# Patient Record
Sex: MALE | Race: WHITE | Employment: OTHER | URBAN - METROPOLITAN AREA
[De-identification: names, ages, dates, MRNs, and addresses within clinical notes are randomized per-mention and may not be internally consistent; named-entity substitution may affect disease eponyms.]

---

## 2017-03-09 ENCOUNTER — ALLIED HEALTH/NURSE VISIT (OUTPATIENT)
Dept: CARDIOLOGY | Facility: CLINIC | Age: 82
End: 2017-03-09
Payer: MEDICARE

## 2017-03-09 DIAGNOSIS — Z95.0 CARDIAC PACEMAKER IN SITU: Primary | ICD-10-CM

## 2017-03-09 PROCEDURE — 93293 PM PHONE R-STRIP DEVICE EVAL: CPT | Performed by: INTERNAL MEDICINE

## 2017-03-09 NOTE — MR AVS SNAPSHOT
"              After Visit Summary   3/9/2017    Balta Drake    MRN: [id number]           Patient Information     Date Of Birth          10/23/1927        Visit Information        Provider Department      3/9/2017 11:15 AM GOINS TECH2 [facility]        Today's Diagnoses     Cardiac pacemaker in situ    -  1       Follow-ups after your visit        Additional Services     Follow-Up with Device Clinic                 Future tests that were ordered for you today     Open Future Orders        Priority Expected Expires Ordered    Follow-Up with Device Clinic Routine 6/9/2017 4/13/2018 3/9/2017            Who to contact     If you have questions or need follow up information about today's clinic visit or your schedule please contact [facility] directly at [phone].  Normal or non-critical lab and imaging results will be communicated to you by [employer], letter or phone within 4 business days after the clinic has received the results. If you do not hear from us within 7 days, please contact the clinic through [employer] or phone. If you have a critical or abnormal lab result, we will notify you by phone as soon as possible.  Submit refill requests through [employer] or call your pharmacy and they will forward the refill request to us. Please allow 3 business days for your refill to be completed.          Additional Information About Your Visit        MyChart Information     [employer] lets you send messages to your doctor, view your test results, renew your prescriptions, schedule appointments and more. To sign up, go to [email or web address] . Click on \"Log in\" on the left side of the screen, which will take you to the Welcome page. Then click on \"Sign up Now\" on the right side of the page.     You will be asked to enter the access code listed below, as well as some personal information. Please follow the directions to create your username and " password.     Your access code is: [email or web address]  Expires: 2017 11:17 AM     Your access code will  in 90 days. If you need help or a new code, please call your [place] clinic or [phone].        Care EveryWhere ID     This is your Care EveryWhere ID. This could be used by other organizations to access your [place] medical records  [id number]         Blood Pressure from Last 3 Encounters:   16 110/66   16 116/64   09/25/15 100/60    Weight from Last 3 Encounters:   16 89.8 kg (198 lb)   16 91.1 kg (200 lb 14.4 oz)   09/25/15 87.8 kg (193 lb 8 oz)              We Performed the Following     PM PHONE R STRIP EVAL UP TO 90 DAYS ([phone])        Primary Care Provider Office Phone # Fax #    Edmundo Roblero [phone]       [place] [address]        Thank you!     Thank you for choosing [facility] AT [place]  for your care. Our goal is always to provide you with excellent care. Hearing back from our patients is one way we can continue to improve our services. Please take a few minutes to complete the written survey that you may receive in the mail after your visit with us. Thank you!             Your Updated Medication List - Protect others around you: Learn how to safely use, store and throw away your medicines at www.disposemymeds.org.          This list is accurate as of: 3/9/17 11:17 AM.  Always use your most recent med list.                   Brand Name Dispense Instructions for use    amiodarone 200 MG tablet    PACERONE/CODARONE    45 tablet    Take 0.5 tablets (100 mg) by mouth daily 1/2 daily       celecoxib 200 MG capsule    celeBREX    90 capsule    Take 1 capsule by mouth daily.       ezetimibe-simvastatin 10-40 MG per tablet    VYTORIN    90 tablet    Take 1 tablet by mouth At Bedtime       FLOMAX 0.4 MG capsule   Generic drug:  tamsulosin      Take 0.4 mg by mouth daily        metoprolol 50 MG 24 hr tablet    TOPROL-XL    90 tablet    Take 1 tablet (50 mg) by mouth daily       NITROGLYCERIN SL      Place 0.4 mg under the tongue every 5 minutes as needed for chest pain       senna-docusate 8.6-50 MG per tablet    SENOKOT-S;PERICOLACE    60 tablet    Take 1-2 tablets by mouth 2 times daily Continue while taking narcotic pain medications. Hold for loose stools.       TYLENOL PO      Take by mouth every 4 hours as needed       warfarin 2.5 MG tablet    COUMADIN    100 tablet    Take 2 tabs on Wednesdays and take 1 tab on all other days or as directed by INR clinic

## 2017-03-09 NOTE — NURSING NOTE
Phone teletrace  Intrinsic Rhythm at time of check. Magnet response WNL.  F/U scheduled q 3 months. For annual threshold.  Order placed, pt will make appointment same time as MD.

## 2017-05-02 ENCOUNTER — DOCUMENTATION ONLY (OUTPATIENT)
Dept: CARDIOLOGY | Facility: CLINIC | Age: 82
End: 2017-05-02

## 2017-05-02 DIAGNOSIS — I48.91 ATRIAL FIBRILLATION (H): Primary | ICD-10-CM

## 2017-05-18 ENCOUNTER — TRANSFERRED RECORDS (OUTPATIENT)
Dept: CARDIOLOGY | Facility: CLINIC | Age: 82
End: 2017-05-18

## 2017-05-24 ENCOUNTER — PRE VISIT (OUTPATIENT)
Dept: CARDIOLOGY | Facility: CLINIC | Age: 82
End: 2017-05-24

## 2017-05-24 DIAGNOSIS — I49.5 SICK SINUS SYNDROME (H): ICD-10-CM

## 2017-05-24 DIAGNOSIS — I48.0 PAROXYSMAL ATRIAL FIBRILLATION (H): ICD-10-CM

## 2017-06-07 ENCOUNTER — HOSPITAL ENCOUNTER (OUTPATIENT)
Dept: RESPIRATORY THERAPY | Facility: CLINIC | Age: 82
End: 2017-06-07
Attending: INTERNAL MEDICINE
Payer: MEDICARE

## 2017-06-07 ENCOUNTER — HOSPITAL ENCOUNTER (OUTPATIENT)
Dept: GENERAL RADIOLOGY | Facility: CLINIC | Age: 82
Discharge: HOME | End: 2017-06-07
Attending: INTERNAL MEDICINE | Admitting: INTERNAL MEDICINE
Payer: MEDICARE

## 2017-06-07 ENCOUNTER — ANTICOAGULATION THERAPY VISIT (OUTPATIENT)
Dept: CARDIOLOGY | Facility: CLINIC | Age: 82
End: 2017-06-07
Payer: MEDICARE

## 2017-06-07 ENCOUNTER — HOSPITAL ENCOUNTER (OUTPATIENT)
Dept: CARDIOLOGY | Facility: CLINIC | Age: 82
End: 2017-06-07
Attending: INTERNAL MEDICINE
Payer: MEDICARE

## 2017-06-07 DIAGNOSIS — I10 ESSENTIAL HYPERTENSION WITH GOAL BLOOD PRESSURE LESS THAN 140/90: ICD-10-CM

## 2017-06-07 DIAGNOSIS — Z79.01 LONG-TERM (CURRENT) USE OF ANTICOAGULANTS: ICD-10-CM

## 2017-06-07 DIAGNOSIS — Z95.0 CARDIAC PACEMAKER IN SITU: ICD-10-CM

## 2017-06-07 DIAGNOSIS — I21.29 ACUTE MYOCARDIAL INFARCTION INVOLVING OTHER CORONARY ARTERY: ICD-10-CM

## 2017-06-07 DIAGNOSIS — I25.10 ASCVD (ARTERIOSCLEROTIC CARDIOVASCULAR DISEASE): ICD-10-CM

## 2017-06-07 DIAGNOSIS — I48.0 PAROXYSMAL ATRIAL FIBRILLATION (H): ICD-10-CM

## 2017-06-07 DIAGNOSIS — I48.91 ATRIAL FIBRILLATION, UNSPECIFIED TYPE (H): ICD-10-CM

## 2017-06-07 LAB
ALBUMIN SERPL-MCNC: 3.7 G/DL (ref 3.4–5)
ALP SERPL-CCNC: 89 U/L (ref 40–150)
ALT SERPL W P-5'-P-CCNC: 20 U/L (ref 0–70)
ANION GAP SERPL CALCULATED.3IONS-SCNC: 7.3 MMOL/L (ref 6–17)
AST SERPL W P-5'-P-CCNC: 18 U/L (ref 0–45)
BILIRUB DIRECT SERPL-MCNC: 0.2 MG/DL (ref 0–0.2)
BILIRUB SERPL-MCNC: 0.7 MG/DL (ref 0.2–1.3)
BUN SERPL-MCNC: 22 MG/DL (ref 7–30)
CALCIUM SERPL-MCNC: 8.6 MG/DL (ref 8.5–10.5)
CHLORIDE SERPL-SCNC: 106 MMOL/L (ref 98–107)
CHOLEST SERPL-MCNC: 119 MG/DL
CO2 SERPL-SCNC: 27 MMOL/L (ref 23–29)
CREAT SERPL-MCNC: 1.13 MG/DL (ref 0.7–1.3)
DLCOCOR-%PRED-PRE: 85 %
DLCOCOR-PRE: 17.26 ML/MIN/MMHG
DLCOUNC-%PRED-PRE: 84 %
DLCOUNC-PRE: 17.01 ML/MIN/MMHG
DLCOUNC-PRED: 20.19 ML/MIN/MMHG
ERV-%PRED-PRE: 160 %
ERV-PRE: 0.53 L
ERV-PRED: 0.33 L
EXPTIME-PRE: 8.85 SEC
FEF2575-%PRED-PRE: 180 %
FEF2575-PRE: 2.7 L/SEC
FEF2575-PRED: 1.5 L/SEC
FEFMAX-%PRED-PRE: 133 %
FEFMAX-PRE: 6.87 L/SEC
FEFMAX-PRED: 5.14 L/SEC
FEV1-%PRED-PRE: 123 %
FEV1-PRE: 2.69 L
FEV1FEV6-PRE: 81 %
FEV1FEV6-PRED: 75 %
FEV1FVC-PRE: 80 %
FEV1FVC-PRED: 75 %
FEV1SVC-PRE: 81 %
FEV1SVC-PRED: 59 %
FIFMAX-PRE: 2.24 L/SEC
FRCPLETH-%PRED-PRE: 88 %
FRCPLETH-PRE: 3.26 L
FRCPLETH-PRED: 3.67 L
FVC-%PRED-PRE: 115 %
FVC-PRE: 3.38 L
FVC-PRED: 2.93 L
GFR SERPL CREATININE-BSD FRML MDRD: 61 ML/MIN/1.7M2
GLUCOSE SERPL-MCNC: 100 MG/DL (ref 70–105)
HDLC SERPL-MCNC: 49 MG/DL
HGB BLD-MCNC: 14.1 G/DL (ref 13.3–17.7)
IC-%PRED-PRE: 82 %
IC-PRE: 2.79 L
IC-PRED: 3.37 L
INR POINT OF CARE: 2.4 (ref 0.86–1.14)
LDLC SERPL CALC-MCNC: 51 MG/DL
NONHDLC SERPL-MCNC: 70 MG/DL
POTASSIUM SERPL-SCNC: 4.3 MMOL/L (ref 3.5–5.1)
PROT SERPL-MCNC: 7 G/DL (ref 6.8–8.8)
RVPLETH-%PRED-PRE: 92 %
RVPLETH-PRE: 2.73 L
RVPLETH-PRED: 2.95 L
SODIUM SERPL-SCNC: 136 MMOL/L (ref 136–145)
TLCPLETH-%PRED-PRE: 94 %
TLCPLETH-PRE: 6.06 L
TLCPLETH-PRED: 6.42 L
TRIGL SERPL-MCNC: 95 MG/DL
TSH SERPL DL<=0.05 MIU/L-ACNC: 1.15 MU/L (ref 0.4–4)
VA-%PRED-PRE: 92 %
VA-PRE: 5.67 L
VC-%PRED-PRE: 89 %
VC-PRE: 3.32 L
VC-PRED: 3.7 L

## 2017-06-07 PROCEDURE — 80048 BASIC METABOLIC PNL TOTAL CA: CPT | Performed by: INTERNAL MEDICINE

## 2017-06-07 PROCEDURE — 85610 PROTHROMBIN TIME: CPT | Mod: QW | Performed by: INTERNAL MEDICINE

## 2017-06-07 PROCEDURE — 94010 BREATHING CAPACITY TEST: CPT

## 2017-06-07 PROCEDURE — 85018 HEMOGLOBIN: CPT | Performed by: INTERNAL MEDICINE

## 2017-06-07 PROCEDURE — 40000264 ECHO COMPLETE WITH LUMASON

## 2017-06-07 PROCEDURE — 80061 LIPID PANEL: CPT | Performed by: INTERNAL MEDICINE

## 2017-06-07 PROCEDURE — 80076 HEPATIC FUNCTION PANEL: CPT | Performed by: INTERNAL MEDICINE

## 2017-06-07 PROCEDURE — 93306 TTE W/DOPPLER COMPLETE: CPT | Mod: 26 | Performed by: INTERNAL MEDICINE

## 2017-06-07 PROCEDURE — 71010 XR CHEST 1 VW: CPT

## 2017-06-07 PROCEDURE — 84443 ASSAY THYROID STIM HORMONE: CPT | Performed by: INTERNAL MEDICINE

## 2017-06-07 PROCEDURE — 25500064 ZZH RX 255 OP 636: Performed by: INTERNAL MEDICINE

## 2017-06-07 PROCEDURE — 94729 DIFFUSING CAPACITY: CPT

## 2017-06-07 PROCEDURE — 36416 COLLJ CAPILLARY BLOOD SPEC: CPT | Performed by: INTERNAL MEDICINE

## 2017-06-07 PROCEDURE — 94726 PLETHYSMOGRAPHY LUNG VOLUMES: CPT

## 2017-06-07 RX ADMIN — SULFUR HEXAFLUORIDE 5 ML: KIT at 10:30

## 2017-06-07 NOTE — MR AVS SNAPSHOT
Balta Drake   6/7/2017 1:00 PM   Anticoagulation Therapy Visit    Description:  89 year old male   Provider:   ANTICOAGULATION   Department:  [facility]           INR as of 6/7/2017     Today's INR 2.4      Anticoagulation Summary as of 6/7/2017     INR goal 2.0-3.0   Today's INR 2.4   Full instructions 5 mg on Wed; 2.5 mg all other days   Next INR check 7/12/2017    Indications   Long-term (current) use of anticoagulants [Z79.01] [Z79.01]  Paroxysmal atrial fibrillation (H) [I48.0]         Your next Anticoagulation Clinic appointment(s)     Jun 07, 2017  1:00 PM CDT   Anticoagulation Visit with  ANTICOAGULATION   [facility] ([facility])    [address] [place], [place] [address]  [facility] [address]   [phone]            Jul 12, 2017  1:00 PM CDT   Anticoagulation Visit with  ANTICOAGULATION   [facility] ([facility])    [address] [place], [place] [address]  [facility] [address]   [phone]              Contact Numbers     Anticoagulant (INR) Clinic Number: [phone]          June 2017 Details    Sun Mon Tue Wed Thu Fri Sat         1               2               3                 4               5               6               7      5 mg   See details      8      2.5 mg         9      2.5 mg         10      2.5 mg           11      2.5 mg         12      2.5 mg         13      2.5 mg         14      5 mg         15      2.5 mg         16      2.5 mg         17      2.5 mg           18      2.5 mg         19      2.5 mg         20      2.5 mg         21      5 mg         22      2.5 mg         23      2.5 mg         24      2.5 mg           25      2.5 mg         26      2.5 mg         27      2.5 mg         28      5 mg         29      2.5 mg         30      2.5 mg           Date Details   06/07 This INR check               How to take your warfarin dose     To take:  2.5 mg Take 1 of the 2.5  mg tablets.    To take:  5 mg Take 2 of the 2.5 mg tablets.           July 2017 Details    Sun Mon Tue Wed Thu Fri Sat           1      2.5 mg           2      2.5 mg         3      2.5 mg         4      2.5 mg         5      5 mg         6      2.5 mg         7      2.5 mg         8      2.5 mg           9      2.5 mg         10      2.5 mg         11      2.5 mg         12            13               14               15                 16               17               18               19               20               21               22                 23               24               25               26               27               28               29                 30               31                     Date Details   No additional details    Date of next INR:  7/12/2017         How to take your warfarin dose     To take:  2.5 mg Take 1 of the 2.5 mg tablets.    To take:  5 mg Take 2 of the 2.5 mg tablets.

## 2017-06-07 NOTE — PROGRESS NOTES
ANTICOAGULATION FOLLOW-UP CLINIC VISIT    Patient Name:  Balta Drake  Date:  6/7/2017  Contact Type:  Face to Face    SUBJECTIVE:     Patient Findings     Positives No Problem Findings           OBJECTIVE    INR Protime   Date Value Ref Range Status   06/07/2017 2.4 (A) 0.86 - 1.14 Final       ASSESSMENT / PLAN  INR assessment THER    Recheck INR In: 5 WEEKS    INR Location Clinic      Anticoagulation Summary as of 6/7/2017     INR goal 2.0-3.0   Today's INR 2.4   Maintenance plan 5 mg (2.5 mg x 2) on Wed; 2.5 mg (2.5 mg x 1) all other days   Full instructions 5 mg on Wed; 2.5 mg all other days   Weekly total 20 mg   No change documented Karina Costa RN   Plan last modified Karina Costa RN (5/19/2016)   Next INR check 7/12/2017   Target end date Indefinite    Indications   Long-term (current) use of anticoagulants [Z79.01] [Z79.01]  Paroxysmal atrial fibrillation (H) [I48.0]         Anticoagulation Episode Summary     INR check location     Preferred lab     Send INR reminders to [facility] HEART INR NURSE    Comments       Anticoagulation Care Providers     Provider Role Specialty Phone number    Fran Galo MD Responsible Cardiology [phone]            See the Encounter Report to view Anticoagulation Flowsheet and Dosing Calendar (Go to Encounters tab in chart review, and find the Anticoagulation Therapy Visit)    INR 2.4.  He is back from [place] and he said his INRs tested in [place] were all between 2-3 and dosing didn't change.  Continue same schedule and recheck in 5 weeks to get past 4th of July week.  Allen Costa, RN

## 2017-06-14 ENCOUNTER — OFFICE VISIT (OUTPATIENT)
Dept: CARDIOLOGY | Facility: CLINIC | Age: 82
End: 2017-06-14
Attending: INTERNAL MEDICINE
Payer: MEDICARE

## 2017-06-14 VITALS
HEIGHT: 66 IN | SYSTOLIC BLOOD PRESSURE: 102 MMHG | BODY MASS INDEX: 31.98 KG/M2 | WEIGHT: 199 LBS | HEART RATE: 88 BPM | DIASTOLIC BLOOD PRESSURE: 66 MMHG

## 2017-06-14 DIAGNOSIS — I21.29 ACUTE MYOCARDIAL INFARCTION INVOLVING OTHER CORONARY ARTERY: ICD-10-CM

## 2017-06-14 DIAGNOSIS — I25.10 ASCVD (ARTERIOSCLEROTIC CARDIOVASCULAR DISEASE): ICD-10-CM

## 2017-06-14 DIAGNOSIS — Z79.01 LONG TERM (CURRENT) USE OF ANTICOAGULANTS: ICD-10-CM

## 2017-06-14 DIAGNOSIS — I49.5 SINOATRIAL NODE DYSFUNCTION (H): Primary | ICD-10-CM

## 2017-06-14 DIAGNOSIS — Z95.0 CARDIAC PACEMAKER IN SITU: ICD-10-CM

## 2017-06-14 DIAGNOSIS — I10 ESSENTIAL HYPERTENSION WITH GOAL BLOOD PRESSURE LESS THAN 140/90: ICD-10-CM

## 2017-06-14 DIAGNOSIS — I48.91 ATRIAL FIBRILLATION, UNSPECIFIED TYPE (H): ICD-10-CM

## 2017-06-14 PROCEDURE — 99215 OFFICE O/P EST HI 40 MIN: CPT | Mod: 25 | Performed by: INTERNAL MEDICINE

## 2017-06-14 PROCEDURE — 93000 ELECTROCARDIOGRAM COMPLETE: CPT | Performed by: INTERNAL MEDICINE

## 2017-06-14 PROCEDURE — 93280 PM DEVICE PROGR EVAL DUAL: CPT | Performed by: INTERNAL MEDICINE

## 2017-06-14 RX ORDER — WARFARIN SODIUM 2.5 MG/1
TABLET ORAL
Qty: 100 TABLET | Refills: 3 | Status: SHIPPED | OUTPATIENT
Start: 2017-06-14 | End: 2017-10-03

## 2017-06-14 NOTE — MR AVS SNAPSHOT
After Visit Summary   6/14/2017    Balta Drake    MRN: [id number]           Patient Information     Date Of Birth          10/23/1927        Visit Information        Provider Department      6/14/2017 2:15 PM Fran Galo MD [facility] HEART [facility]        Today's Diagnoses     ASCVD (arteriosclerotic cardiovascular disease)        Acute myocardial infarction involving other coronary artery (H)        Essential hypertension with goal blood pressure less than 140/90        Atrial fibrillation, unspecified type (H)        Cardiac pacemaker in situ        Long term (current) use of anticoagulants           Follow-ups after your visit        Additional Services     Follow-Up with Cardiologist                 Your next 10 appointments already scheduled     Jul 12, 2017  1:00 PM CDT   Anticoagulation Visit with GOINS ANTICOAGULATION   [facility] ([facility])    [facility] [facility] [address]  [facility] [address] [phone]            Sep 20, 2017 11:30 AM CDT   Phone Device Check with GOINS TECH1   [facility] ([facility])    [address] [id number]  [facility] [address] [phone]              Future tests that were ordered for you today     Open Future Orders        Priority Expected Expires Ordered    Basic metabolic panel Routine 9/12/2017 6/14/2018 6/14/2017    Lipid Profile Routine 9/12/2017 6/14/2018 6/14/2017    ALT Routine 9/12/2017 6/14/2018 6/14/2017    Follow-Up with Cardiologist Routine 9/12/2017 6/14/2018 6/14/2017            Who to contact     If you have questions or need follow up information about today's clinic visit or your schedule please contact [facility] directly at [phone].  Normal or non-critical lab and imaging results will be communicated to you by MyChart, letter or phone within 4  "business days after the clinic has received the results. If you do not hear from us within 7 days, please contact the clinic through [employer] or phone. If you have a critical or abnormal lab result, we will notify you by phone as soon as possible.  Submit refill requests through [employer] or call your pharmacy and they will forward the refill request to us. Please allow 3 business days for your refill to be completed.          Additional Information About Your Visit        [employer] Information     [employer] lets you send messages to your doctor, view your test results, renew your prescriptions, schedule appointments and more. To sign up, go to [email or web address] . Click on \"Log in\" on the left side of the screen, which will take you to the Welcome page. Then click on \"Sign up Now\" on the right side of the page.     You will be asked to enter the access code listed below, as well as some personal information. Please follow the directions to create your username and password.     Your access code is: [email or web address]  Expires: 2017  1:45 PM     Your access code will  in 90 days. If you need help or a new code, please call your [place] clinic or [phone].        Care EveryWhere ID     This is your Care EveryWhere ID. This could be used by other organizations to access your [place] medical records  [id number]        Your Vitals Were     Pulse Height BMI (Body Mass Index)             88 1.676 m (5' 6\") 32.12 kg/m2          Blood Pressure from Last 3 Encounters:   17 102/66   16 110/66   16 116/64    Weight from Last 3 Encounters:   17 90.3 kg (199 lb)   16 89.8 kg (198 lb)   16 91.1 kg (200 lb 14.4 oz)              We Performed the Following     EKG 12-lead complete w/read - Clinics (performed today)     Follow-Up with Cardiologist          Where to get your medicines      These medications were sent to [facility] [place] - [facility] "  [address] [address]     Phone:  [phone]     warfarin 2.5 MG tablet          Primary Care Provider Office Phone # Fax #    Edmundo Roblero [phone]       [address] [address]        Thank you!     Thank you for choosing [facility] [place] [facility] AT [place]  for your care. Our goal is always to provide you with excellent care. Hearing back from our patients is one way we can continue to improve our services. Please take a few minutes to complete the written survey that you may receive in the mail after your visit with us. Thank you!             Your Updated Medication List - Protect others around you: Learn how to safely use, store and throw away your medicines at www.disposemymeds.org.          This list is accurate as of: 6/14/17  3:39 PM.  Always use your most recent med list.                   Brand Name Dispense Instructions for use    amiodarone 200 MG tablet    PACERONE/CODARONE    45 tablet    Take 0.5 tablets (100 mg) by mouth daily 1/2 daily       celecoxib 200 MG capsule    celeBREX    90 capsule    Take 1 capsule by mouth daily.       ezetimibe-simvastatin 10-40 MG per tablet    VYTORIN    90 tablet    Take 1 tablet by mouth At Bedtime       FLOMAX 0.4 MG capsule   Generic drug:  tamsulosin      Take 0.4 mg by mouth daily       metoprolol 50 MG 24 hr tablet    TOPROL-XL    90 tablet    Take 1 tablet (50 mg) by mouth daily       NITROGLYCERIN SL      Place 0.4 mg under the tongue every 5 minutes as needed for chest pain       senna-docusate 8.6-50 MG per tablet    SENOKOT-S;PERICOLACE    60 tablet    Take 1-2 tablets by mouth 2 times daily Continue while taking narcotic pain medications. Hold for loose stools.       TYLENOL PO      Take by mouth every 4 hours as needed       warfarin 2.5 MG tablet    COUMADIN    100 tablet    Take 2 tabs on Wednesdays and take 1 tab on all other days or as directed  by INR clinic

## 2017-06-14 NOTE — LETTER
6/14/2017    Edmundo Roblero MD  [address] [address]    RE: Balta Drake       Dear Colleague,    I had the pleasure of seeing Balta Drake in the [facility].    The patient is an 89-year-old overweight white male hospitalized at [facility] in 2002 because of increased symptoms of chest discomfort associated with acute coronary syndrome.  He underwent cardiac catheterization and coronary angiography which demonstrated decreased flow in the first obtuse marginal branch of the circumflex.  He was treated with PTCA and stent in the marginal branch of the circumflex.  There was mild disease in left anterior descending and right coronary at that time.  He was also treated for sick sinus syndrome with evidence of tachybrady syndrome with paroxysmal atrial fibrillation with a permanent pacemaker in [place] in the mid 2000s.  He also a history of diverticulitis, GI upset and diarrhea as well as paroxysmal atrial fibrillation with isolated episodes of nonsustained ventricular tachycardia with a 4-beat run noted on Holter monitors in the past.        Most recent nuclear stress test was performed in 2014 showed a small to moderate size reversible anterolateral defect consistent with ischemia with moderate-sized partly reversible inferior/inferolateral defect consistent with nontransmural infarct with moderate jax-infarct ischemia.  Because of significant ischemia, he underwent cardiac catheterization and was noted to have patent stents, good flow through the left main coronary artery.  There was diffuse disease in the circumflex marginal branch with ISABEL 3 flow.  It was recommended that he have aggressive medical therapy.  The ramus intermediate branch was noted to be occluded with angiography.      MEDICATIONS:   1.  Metoprolol XL 50 mg a day.   2.  Amiodarone 100 mg a day.     3.  Ezetimibe/simvastatin 10/40 mg a day.   4.   Senna docusate 1-2 tablets as needed.   5.  Acetaminophen 650 mg every 4 hours as needed.   6.  Nitroglycerin 0.4 mg sublingual p.r.n., which has not been used.   7.  Flomax 0.4 mg a day.   8.  Celebrex 200 mg a day.   9.  Albuterol inhaler as needed.      LABORATORY DATA:  Demonstrated a cholesterol 119, HDL 49, LDL 51, triglyceride 95.  Sodium 136, potassium 4.3, BUN 22, creatinine 1.13.  TSH is 1.15, ALT is 20, AST is 18.  INR was 2.4.        The patient had electrocardiogram at the time of his clinic visit which showed an atrial paced rhythm with a prolonged OR interval and native QRS complexes that did show conduction system delay.  The patient states that he has not had significant chest discomfort, shortness of breath at rest, dizziness, palpitations, nausea, vomiting, diaphoresis or syncope.  He has increasing symptoms of easy fatigability, general malaise and mild-to-moderate dyspnea on exertion.  He also has been somewhat frustrated with the confusion about his prescriptions, especially that of warfarin since he spends half of his year in [place] and 1/2 of his year in [place].        He again does not describe symptoms of PND, orthopnea, fever, chills or sweats.  He presents to Cardiology Clinic for followup of his ischemic heart disease as well as his pacemaker that was placed floor bradydysrhythmia.  He had an echocardiogram done last year that showed mild to moderate global hypokinesis with ejection fraction of 40%-45%, normal-sized left ventricle and right ventricle with intact systolic function of the right ventricle.  There is mild to moderate dilatation of left atrium with a pacing wire in the right atrium and right ventricle.  There was mild aortic, mitral and tricuspid insufficiency without significant stenosis.      PHYSICAL EXAMINATION:   VITAL SIGNS:  Blood pressure 102/66, heart rate of 80-90, irregular.  Weight was 199 pounds, which is stable.   NECK:  Without jugular venous distention,  carotid bruit or palpable thyroid.   CHEST:  Essentially clear to percussion and auscultation with a few scattered isolated crackles and slight dullness at the bases.   CARDIAC:  Revealed a fairly regular rhythm, a 1-2/6 systolic murmur at the left sternal border with no significant radiation.  No diastolic murmur, rub or S3.   EXTREMITIES:  Without significant cyanosis.  There was trace to 1+ edema bilaterally.      CLINICAL IMPRESSION:   1.  Stable cardiac condition.   2.  Ischemic heart disease, status post PTCA and stent placed in the obtuse marginal branch of the circumflex coronary with repeat cardiac catheterization showing diffuse 2-vessel disease involving the circumflex primarily with recommendation medical therapy in 2014.   3.  Hyperlipidemia, at goal.   4.  Hypertension with adequate control.   5.  Significant degenerative joint disease and osteoarthritis with spinal stenosis causing significant pain, status post surgery because of recurrent left leg pain.   6.  History of paroxysmal atrial fibrillation, becoming more prevalent.   7.  Status post hip replacement surgery.   8.  History of permanent pacemaker insertion for bradydysrhythmia.      DISCUSSION:  The patient has actually done reasonably well from a cardiac viewpoint.  He feels quite limited with regards easy fatigability, general malaise and he has nocturia 4-5 times a night.  He does not participate in regular activity and has had poor sleep and somewhat poor nutrition, especially since he has had to deal with his own food.  He has had followup of amiodarone with normal liver function tests and pulmonary function tests.  He will continue cautious anticoagulation because of elevated CHADS-VASc score and have close followup of serum lipids, basic metabolic panel and blood pressure.      Echocardiography that was performed with contrast demonstrated ejection fraction of 35%-40% of the left ventricle and mild to moderate global hypokinesis.   There was evidence of impaired left ventricular relaxation.  There is a pacemaker seen in the right ventricle and right atrium.  There was mild aortic insufficiency, mild aortic root dilatation, mild dilatation of the ascending thoracic aorta.  It was discussed the patient means of which to decrease his nocturia and to try to reestablish activity without necessarily having to change much in the way of his medications.  The pacemaker has been adjusted at least once if not twice to provide for better heart rate response to activity.      RECOMMENDATIONS:   1.  Continue present medications.   2.  Close followup of serum lipids, basic metabolic panel and blood pressure.   3.  Device followup with possible adjustment of rate responsiveness.   4.  Cautious anticoagulation.   5.  Diet and exercise as tolerated on a regular basis.   6.  Attempt to maintain sleep pattern.   7.  May consider Holter monitor later in the year to follow rhythm.   8.  Routine medical followup.   9.  Cardiology followup in 4-6 months     Again, thank you for allowing me to participate in the care of your patient.      Sincerely,    Fran Galo MD     [facility]

## 2017-06-14 NOTE — MR AVS SNAPSHOT
After Visit Summary   6/14/2017    Balta Drake    MRN: [id number]           Patient Information     Date Of Birth          10/23/1927        Visit Information        Provider Department      6/14/2017 1:00 PM GOINS DCRN [facility]        Today's Diagnoses     Sinoatrial node dysfunction (H)    -  1    Cardiac pacemaker in situ           Follow-ups after your visit        Your next 10 appointments already scheduled     Jun 14, 2017  2:15 PM CDT   Return Visit with Fran Galo MD   [facility] ([facility])    [address] [place] [address] [address]  [facility] [address]   [phone]            Jul 12, 2017  1:00 PM CDT   Anticoagulation Visit with BUSTER ANTICOAGULATION   [facility] ([facility])    [address] [place] [address] [address]  [facility] [id number] [phone]            Sep 20, 2017 11:30 AM CDT   Phone Device Check with GOINS TECH1   [facility] ([facility])    [address] [place] [address] [address]  [facility] [address] [phone]              Who to contact     If you have questions or need follow up information about today's clinic visit or your schedule please contact [facility] directly at [phone].  Normal or non-critical lab and imaging results will be communicated to you by MyChart, letter or phone within 4 business days after the clinic has received the results. If you do not hear from us within 7 days, please contact the clinic through MyChart or phone. If you have a critical or abnormal lab result, we will notify you by phone as soon as possible.  Submit refill requests through [employer] or call your pharmacy and they will forward the refill request to us. Please allow 3 business days for your refill to be completed.          Additional Information About Your  "Visit        MyChart Information     [employer] lets you send messages to your doctor, view your test results, renew your prescriptions, schedule appointments and more. To sign up, go to [email or web address] . Click on \"Log in\" on the left side of the screen, which will take you to the Welcome page. Then click on \"Sign up Now\" on the right side of the page.     You will be asked to enter the access code listed below, as well as some personal information. Please follow the directions to create your username and password.     Your access code is: [email or web address]  Expires: 2017  1:45 PM     Your access code will  in 90 days. If you need help or a new code, please call your [place] clinic or [phone].        Care EveryWhere ID     This is your Care EveryWhere ID. This could be used by other organizations to access your [place] medical records  [id number]         Blood Pressure from Last 3 Encounters:   16 110/66   16 116/64   09/25/15 100/60    Weight from Last 3 Encounters:   16 89.8 kg (198 lb)   16 91.1 kg (200 lb 14.4 oz)   09/25/15 87.8 kg (193 lb 8 oz)              We Performed the Following     Follow-Up with Device Clinic     PM DEVICE PROGRAMMING EVAL, DUAL LEAD PACER ([id number])        Primary Care Provider Office Phone # Fax #    Edmundo Roblero [phone]       [address] [address]        Thank you!     Thank you for choosing [facility] [place]  for your care. Our goal is always to provide you with excellent care. Hearing back from our patients is one way we can continue to improve our services. Please take a few minutes to complete the written survey that you may receive in the mail after your visit with us. Thank you!             Your Updated Medication List - Protect others around you: Learn how to safely use, store and throw away your medicines at www.disposemymeds.org.    "       This list is accurate as of: 6/14/17  1:45 PM.  Always use your most recent med list.                   Brand Name Dispense Instructions for use    amiodarone 200 MG tablet    PACERONE/CODARONE    45 tablet    Take 0.5 tablets (100 mg) by mouth daily 1/2 daily       celecoxib 200 MG capsule    celeBREX    90 capsule    Take 1 capsule by mouth daily.       ezetimibe-simvastatin 10-40 MG per tablet    VYTORIN    90 tablet    Take 1 tablet by mouth At Bedtime       FLOMAX 0.4 MG capsule   Generic drug:  tamsulosin      Take 0.4 mg by mouth daily       metoprolol 50 MG 24 hr tablet    TOPROL-XL    90 tablet    Take 1 tablet (50 mg) by mouth daily       NITROGLYCERIN SL      Place 0.4 mg under the tongue every 5 minutes as needed for chest pain       senna-docusate 8.6-50 MG per tablet    SENOKOT-S;PERICOLACE    60 tablet    Take 1-2 tablets by mouth 2 times daily Continue while taking narcotic pain medications. Hold for loose stools.       TYLENOL PO      Take by mouth every 4 hours as needed       warfarin 2.5 MG tablet    COUMADIN    100 tablet    Take 2 tabs on Wednesdays and take 1 tab on all other days or as directed by INR clinic

## 2017-06-14 NOTE — PROGRESS NOTES
[employer] (D) Pacemaker Device Check  AP: 98 % : 10 %  Mode: DDDR         Underlying Rhythm: SB with first degree AVB, HR 43 bpm  Heart Rate: adequate variability, approx 70% of beats at LRL 70, but pt reports feeling more fatigued lately.   Sensing: WNL    Pacing Threshold: slightly elevated in A and V, but stable   Impedance: WNL  Battery Status: 2 yrs estimated longevity  Atrial Arrhythmia: 15 episodes of ATR in the last year, 7 with EGMs confirming afib, A rate 250-300 bpm, V rate mostly controlled/paced. Longest episode 7 hrs in duration. Afib burden <1% in the last year. Pt is on warfarin. No afib episodes since February 2017.   Ventricular Arrhythmia: none  Setting Change: increased Rate Response Factor from 12 to 13 due to pt reports of fatigue and not a lot of variability on his HR histogram. Pt does report his fatigue is only increased in the past month or so, so explained this may not completely solve his symptoms of fatigue.     Care Plan: phone check in 3 months, scheduled.   Pt has OV with Dr. Galo today.    MEG RN

## 2017-06-15 NOTE — PROGRESS NOTES
HISTORY OF PRESENT ILLNESS:  The patient is an 89-year-old overweight white male hospitalized at [facility] in 2002 because of increased symptoms of chest discomfort associated with acute coronary syndrome.  He underwent cardiac catheterization and coronary angiography which demonstrated decreased flow in the first obtuse marginal branch of the circumflex.  He was treated with PTCA and stent in the marginal branch of the circumflex.  There was mild disease in left anterior descending and right coronary at that time.  He was also treated for sick sinus syndrome with evidence of tachybrady syndrome with paroxysmal atrial fibrillation with a permanent pacemaker in [place] in the mid 2000s.  He also a history of diverticulitis, GI upset and diarrhea as well as paroxysmal atrial fibrillation with isolated episodes of nonsustained ventricular tachycardia with a 4-beat run noted on Holter monitors in the past.        Most recent nuclear stress test was performed in 2014 showed a small to moderate size reversible anterolateral defect consistent with ischemia with moderate-sized partly reversible inferior/inferolateral defect consistent with nontransmural infarct with moderate jax-infarct ischemia.  Because of significant ischemia, he underwent cardiac catheterization and was noted to have patent stents, good flow through the left main coronary artery.  There was diffuse disease in the circumflex marginal branch with ISABEL 3 flow.  It was recommended that he have aggressive medical therapy.  The ramus intermediate branch was noted to be occluded with angiography.      MEDICATIONS:   1.  Metoprolol XL 50 mg a day.   2.  Amiodarone 100 mg a day.     3.  Ezetimibe/simvastatin 10/40 mg a day.   4.  Senna docusate 1-2 tablets as needed.   5.  Acetaminophen 650 mg every 4 hours as needed.   6.  Nitroglycerin 0.4 mg sublingual p.r.n., which has not been used.   7.  Flomax 0.4 mg a day.   8.  Celebrex 200 mg a day.    9.  Albuterol inhaler as needed.      LABORATORY DATA:  Demonstrated a cholesterol 119, HDL 49, LDL 51, triglyceride 95.  Sodium 136, potassium 4.3, BUN 22, creatinine 1.13.  TSH is 1.15, ALT is 20, AST is 18.  INR was 2.4.        The patient had electrocardiogram at the time of his clinic visit which showed an atrial paced rhythm with a prolonged IL interval and native QRS complexes that did show conduction system delay.  The patient states that he has not had significant chest discomfort, shortness of breath at rest, dizziness, palpitations, nausea, vomiting, diaphoresis or syncope.  He has increasing symptoms of easy fatigability, general malaise and mild-to-moderate dyspnea on exertion.  He also has been somewhat frustrated with the confusion about his prescriptions, especially that of warfarin since he spends half of his year in [place] and 1/2 of his year in [place].        He again does not describe symptoms of PND, orthopnea, fever, chills or sweats.  He presents to Cardiology Clinic for followup of his ischemic heart disease as well as his pacemaker that was placed floor bradydysrhythmia.  He had an echocardiogram done last year that showed mild to moderate global hypokinesis with ejection fraction of 40%-45%, normal-sized left ventricle and right ventricle with intact systolic function of the right ventricle.  There is mild to moderate dilatation of left atrium with a pacing wire in the right atrium and right ventricle.  There was mild aortic, mitral and tricuspid insufficiency without significant stenosis.      PHYSICAL EXAMINATION:   VITAL SIGNS:  Blood pressure 102/66, heart rate of 80-90, irregular.  Weight was 199 pounds, which is stable.   NECK:  Without jugular venous distention, carotid bruit or palpable thyroid.   CHEST:  Essentially clear to percussion and auscultation with a few scattered isolated crackles and slight dullness at the bases.   CARDIAC:  Revealed a fairly regular rhythm, a  1-2/6 systolic murmur at the left sternal border with no significant radiation.  No diastolic murmur, rub or S3.   EXTREMITIES:  Without significant cyanosis.  There was trace to 1+ edema bilaterally.      CLINICAL IMPRESSION:   1.  Stable cardiac condition.   2.  Ischemic heart disease, status post PTCA and stent placed in the obtuse marginal branch of the circumflex coronary with repeat cardiac catheterization showing diffuse 2-vessel disease involving the circumflex primarily with recommendation medical therapy in 2014.   3.  Hyperlipidemia, at goal.   4.  Hypertension with adequate control.   5.  Significant degenerative joint disease and osteoarthritis with spinal stenosis causing significant pain, status post surgery because of recurrent left leg pain.   6.  History of paroxysmal atrial fibrillation, becoming more prevalent.   7.  Status post hip replacement surgery.   8.  History of permanent pacemaker insertion for bradydysrhythmia.      DISCUSSION:  The patient has actually done reasonably well from a cardiac viewpoint.  He feels quite limited with regards easy fatigability, general malaise and he has nocturia 4-5 times a night.  He does not participate in regular activity and has had poor sleep and somewhat poor nutrition, especially since he has had to deal with his own food.  He has had followup of amiodarone with normal liver function tests and pulmonary function tests.  He will continue cautious anticoagulation because of elevated CHADS-VASc score and have close followup of serum lipids, basic metabolic panel and blood pressure.      Echocardiography that was performed with contrast demonstrated ejection fraction of 35%-40% of the left ventricle and mild to moderate global hypokinesis.  There was evidence of impaired left ventricular relaxation.  There is a pacemaker seen in the right ventricle and right atrium.  There was mild aortic insufficiency, mild aortic root dilatation, mild dilatation of the  ascending thoracic aorta.  It was discussed the patient means of which to decrease his nocturia and to try to reestablish activity without necessarily having to change much in the way of his medications.  The pacemaker has been adjusted at least once if not twice to provide for better heart rate response to activity.      RECOMMENDATIONS:   1.  Continue present medications.   2.  Close followup of serum lipids, basic metabolic panel and blood pressure.   3.  Device followup with possible adjustment of rate responsiveness.   4.  Cautious anticoagulation.   5.  Diet and exercise as tolerated on a regular basis.   6.  Attempt to maintain sleep pattern.   7.  May consider Holter monitor later in the year to follow rhythm.   8.  Routine medical followup.   9.  Cardiology followup in 4-6 months      cc:      Edmundo Roblero MD    [address] [address]         MADDI CONNELL MD, [facility]             D: 2017 20:30   T: 06/15/2017 13:14   MT: REGLA      Name:     VOLODYMYR GONZALES   MRN:      [id number]        Account:      [id number]   :      10/23/1927           Service Date: 2017      Document: [id number]

## 2017-07-12 ENCOUNTER — ANTICOAGULATION THERAPY VISIT (OUTPATIENT)
Dept: CARDIOLOGY | Facility: CLINIC | Age: 82
End: 2017-07-12
Payer: MEDICARE

## 2017-07-12 ENCOUNTER — MEDICAL CORRESPONDENCE (OUTPATIENT)
Dept: CARDIOLOGY | Facility: CLINIC | Age: 82
End: 2017-07-12

## 2017-07-12 DIAGNOSIS — Z79.01 LONG-TERM (CURRENT) USE OF ANTICOAGULANTS: ICD-10-CM

## 2017-07-12 DIAGNOSIS — I48.0 PAROXYSMAL ATRIAL FIBRILLATION (H): ICD-10-CM

## 2017-07-12 LAB — INR POINT OF CARE: 2.1 (ref 0.86–1.14)

## 2017-07-12 PROCEDURE — 85610 PROTHROMBIN TIME: CPT | Mod: QW | Performed by: INTERNAL MEDICINE

## 2017-07-12 PROCEDURE — 36416 COLLJ CAPILLARY BLOOD SPEC: CPT | Performed by: INTERNAL MEDICINE

## 2017-07-12 NOTE — MR AVS SNAPSHOT
Balta Drake   7/12/2017 1:00 PM   Anticoagulation Therapy Visit    Description:  89 year old male   Provider:  GOINS ANTICOAGULATION   Department:  [facility]           INR as of 7/12/2017     Today's INR 2.1      Anticoagulation Summary as of 7/12/2017     INR goal 2.0-3.0   Today's INR 2.1   Full instructions 5 mg on Wed; 2.5 mg all other days   Next INR check 8/9/2017    Indications   Long-term (current) use of anticoagulants [Z79.01] [Z79.01]  Paroxysmal atrial fibrillation (H) [I48.0]         Your next Anticoagulation Clinic appointment(s)     Aug 09, 2017 11:00 AM CDT   Anticoagulation Visit with  ANTICOAGULATION   [facility] ([facility])    [address] [place] [id number] [phone]              Contact Numbers     Anticoagulant (INR) Clinic Number: [id number]          July 2017 Details    Sun Mon Tue Wed Thu Fri Sat           1                 2               3               4               5               6               7               8                 9               10               11               12      5 mg   See details      13      2.5 mg         14      2.5 mg         15      2.5 mg           16      2.5 mg         17      2.5 mg         18      2.5 mg         19      5 mg         20      2.5 mg         21      2.5 mg         22      2.5 mg           23      2.5 mg         24      2.5 mg         25      2.5 mg         26      5 mg         27      2.5 mg         28      2.5 mg         29      2.5 mg           30      2.5 mg         31      2.5 mg               Date Details   07/12 This INR check               How to take your warfarin dose     To take:  2.5 mg Take 1 of the 2.5 mg tablets.    To take:  5 mg Take 2 of the 2.5 mg tablets.           August 2017 Details    Sun Mon Tue Wed Thu Fri Sat       1      2.5 mg         2      5 mg         3      2.5 mg         4      2.5 mg         5      2.5 mg            6      2.5 mg         7      2.5 mg         8      2.5 mg         9            10               11               12                 13               14               15               16               17               18               19                 20               21               22               23               24               25               26                 27               28               29               30               31                  Date Details   No additional details    Date of next INR:  8/9/2017         How to take your warfarin dose     To take:  2.5 mg Take 1 of the 2.5 mg tablets.    To take:  5 mg Take 2 of the 2.5 mg tablets.

## 2017-07-12 NOTE — PROGRESS NOTES
"  ANTICOAGULATION FOLLOW-UP CLINIC VISIT    Patient Name:  Balta Drake  Date:  7/12/2017  Contact Type:  Face to Face    SUBJECTIVE:     Patient Findings     Positives Other complaints    Comments Nose bleeds - has noted bleeding from a lesion on his right cheek -- PMD is referring him to a dermatologist             OBJECTIVE    INR Protime   Date Value Ref Range Status   07/12/2017 2.1 (A) 0.86 - 1.14 Final       ASSESSMENT / PLAN  INR assessment THER    Recheck INR In: 4 WEEKS    INR Location Clinic      Anticoagulation Summary as of 7/12/2017     INR goal 2.0-3.0   Today's INR 2.1   Maintenance plan 5 mg (2.5 mg x 2) on Wed; 2.5 mg (2.5 mg x 1) all other days   Full instructions 5 mg on Wed; 2.5 mg all other days   Weekly total 20 mg   No change documented Erin Ji RN   Plan last modified Karina Costa RN (5/19/2016)   Next INR check 8/9/2017   Target end date Indefinite    Indications   Long-term (current) use of anticoagulants [Z79.01] [Z79.01]  Paroxysmal atrial fibrillation (H) [I48.0]         Anticoagulation Episode Summary     INR check location     Preferred lab     Send INR reminders to [facility]R NURSE    Comments       Anticoagulation Care Providers     Provider Role Specialty Phone number    Fran Galo MD Responsible Cardiology [phone]            See the Encounter Report to view Anticoagulation Flowsheet and Dosing Calendar (Go to Encounters tab in chart review, and find the Anticoagulation Therapy Visit)    INR 2.1 He has noted occasional bleeding from a small lesion (less than 1/4\") on right cheek. Pt states that his PMD has referred him to a dermatologist. No change in meds or diet. Will continue current dosing of 5 mg Wed and 2.5 mg all other days with recheck in 4 weeks. Dosage adjustment made based on physician directed care plan.    Erin Ji RN               "

## 2017-07-14 ENCOUNTER — DOCUMENTATION ONLY (OUTPATIENT)
Dept: CARDIOLOGY | Facility: CLINIC | Age: 82
End: 2017-07-14

## 2017-07-14 DIAGNOSIS — E78.2 MIXED HYPERLIPIDEMIA: ICD-10-CM

## 2017-07-14 DIAGNOSIS — I48.20 CHRONIC ATRIAL FIBRILLATION (H): ICD-10-CM

## 2017-07-14 RX ORDER — EZETIMIBE AND SIMVASTATIN 10; 40 MG/1; MG/1
1 TABLET ORAL AT BEDTIME
Qty: 90 TABLET | Refills: 3 | Status: SHIPPED | OUTPATIENT
Start: 2017-07-14 | End: 2018-07-30

## 2017-07-14 RX ORDER — AMIODARONE HYDROCHLORIDE 200 MG/1
100 TABLET ORAL DAILY
Qty: 45 TABLET | Refills: 3 | Status: SHIPPED | OUTPATIENT
Start: 2017-07-14 | End: 2018-07-30

## 2017-07-17 NOTE — PROGRESS NOTES
Reviewed transition process from warfarin to Eliquis with Dr Galo. Per Dr Galo's VORB, pt to hold warfarin for 2 days then check INR and start Eliquis 5 mg bid when INR <2.0. Spoke with pt to review this information. He will call us when his Rx of Eliquis arrives so we can determine the best time for him to hold warfarin for 2 days then have an INR appt then start Eliquis when INR <2.0. Hua

## 2017-08-01 NOTE — PROGRESS NOTES
Pt called to report that he received the Eliquis Rx (Eliquis 5 mg bid). Per Dr Galo's order, pt to hold warfarin 2 days then check INR. Once INR <2.0 then he will start Eliquis. He held warfarin yesterday and today. Scheduled INR appt for tomorrow. Hua

## 2017-08-02 ENCOUNTER — ANTICOAGULATION THERAPY VISIT (OUTPATIENT)
Dept: CARDIOLOGY | Facility: CLINIC | Age: 82
End: 2017-08-02
Payer: MEDICARE

## 2017-08-02 DIAGNOSIS — I48.0 PAROXYSMAL ATRIAL FIBRILLATION (H): ICD-10-CM

## 2017-08-02 DIAGNOSIS — Z79.01 LONG-TERM (CURRENT) USE OF ANTICOAGULANTS: ICD-10-CM

## 2017-08-02 LAB — INR POINT OF CARE: 1.6 (ref 0.86–1.14)

## 2017-08-02 PROCEDURE — 85610 PROTHROMBIN TIME: CPT | Mod: QW | Performed by: INTERNAL MEDICINE

## 2017-08-02 PROCEDURE — 36416 COLLJ CAPILLARY BLOOD SPEC: CPT | Performed by: INTERNAL MEDICINE

## 2017-08-02 NOTE — MR AVS SNAPSHOT
Balta Drake   8/2/2017 11:00 AM   Anticoagulation Therapy Visit    Description:  89 year old male   Provider:  GOINS ANTICOAGULATION   Department:   [facility]           INR as of 8/2/2017         The patient was not given dosing instructions in this encounter.      Anticoagulation Summary as of 8/2/2017         The patient was not given dosing instructions in this encounter.      Contact Numbers     Anticoagulant (INR) Clinic Number: [phone]          July 2017 Details    Sun Mon Tue Wed Thu Fri Sat           1                 2               3               4               5               6               7               8                 9               10               11               12      5 mg   See details      13      2.5 mg         14      2.5 mg         15      2.5 mg           16      2.5 mg         17      2.5 mg         18      2.5 mg         19      5 mg         20      2.5 mg         21      2.5 mg         22      2.5 mg           23      2.5 mg         24      2.5 mg         25      2.5 mg         26      5 mg         27      2.5 mg         28      2.5 mg         29      2.5 mg           30      2.5 mg         31      2.5 mg               Date Details   07/12 This INR check               How to take your warfarin dose     To take:  2.5 mg Take 1 of the 2.5 mg tablets.    To take:  5 mg Take 2 of the 2.5 mg tablets.           August 2017 Details    Sun Mon Tue Wed Thu Fri Sat       1      2.5 mg         2      5 mg         3      2.5 mg         4      2.5 mg         5      2.5 mg           6      2.5 mg         7      2.5 mg         8      2.5 mg         9            10               11               12                 13               14               15               16               17               18               19                 20               21               22               23               24               25               26                 27               28                29 30               31                  Date Details   No additional details    Date of next INR:  8/9/2017         How to take your warfarin dose     To take:  2.5 mg Take 1 of the 2.5 mg tablets.    To take:  5 mg Take 2 of the 2.5 mg tablets.

## 2017-08-02 NOTE — PROGRESS NOTES
Patient is transitioning to Eliquis. Held warfarin x 2 days, INR now < 2.0 (today's reading is 1.6). He will start his first 5 mg tab of Eliquis tonight, and then was reminded that starting tomorrow it is 5mg BID and he should discard his warfarin.

## 2017-09-15 DIAGNOSIS — I25.10 ASCVD (ARTERIOSCLEROTIC CARDIOVASCULAR DISEASE): ICD-10-CM

## 2017-09-15 RX ORDER — METOPROLOL SUCCINATE 50 MG/1
50 TABLET, EXTENDED RELEASE ORAL DAILY
Qty: 90 TABLET | Refills: 2 | Status: SHIPPED | OUTPATIENT
Start: 2017-09-15 | End: 2018-07-30

## 2017-09-20 ENCOUNTER — PRE VISIT (OUTPATIENT)
Dept: CARDIOLOGY | Facility: CLINIC | Age: 82
End: 2017-09-20

## 2017-09-20 ENCOUNTER — ALLIED HEALTH/NURSE VISIT (OUTPATIENT)
Dept: CARDIOLOGY | Facility: CLINIC | Age: 82
End: 2017-09-20
Payer: MEDICARE

## 2017-09-20 DIAGNOSIS — Z95.0 CARDIAC PACEMAKER IN SITU: Primary | ICD-10-CM

## 2017-09-20 PROCEDURE — 93293 PM PHONE R-STRIP DEVICE EVAL: CPT | Performed by: INTERNAL MEDICINE

## 2017-09-20 NOTE — PROGRESS NOTES
~ 90 day phone teletrace ~  Intrinsic rhythm at time of check. Magnet response WNL. F/U scheduled q 3 months. Hortencia FUENTES

## 2017-09-20 NOTE — MR AVS SNAPSHOT
After Visit Summary   9/20/2017    Balta Drake    MRN: [id number]           Patient Information     Date Of Birth          10/23/1927        Visit Information        Provider Department      9/20/2017 11:30 AM GOINS TECH1 [facility]        Today's Diagnoses     Cardiac pacemaker in situ    -  1       Follow-ups after your visit        Your next 10 appointments already scheduled     Oct 03, 2017 12:40 PM CDT   LAB with GOINS LAB   [facility]    [address] [place] [address] [address]  [facility] [id number] [phone]           Patient must bring picture ID. Patient should be prepared to give a urine specimen  Please do not eat 10-12 hours before your appointment if you are coming in fasting for labs on lipids, cholesterol, or glucose (sugar). Pregnant women should follow their Care Team instructions. Water with medications is okay. Do not drink coffee or other fluids. If you have concerns about taking  your medications, please ask at office or if scheduling via [employer], send a message by clicking on Secure Messaging, Message Your Care Team.            Oct 03, 2017  1:45 PM CDT   Return Visit with Fran Galo MD   [facility]    [address] [place] [address] [address]  [facility] [phone]            Dec 21, 2017 10:15 AM CST   Phone Device Check with GOINS TECH2   [facility]    [address] [place] [address] [address]  [facility] [phone] [phone]              Who to contact     If you have questions or need follow up information about today's clinic visit or your schedule please contact [facility] directly at [phone].  Normal or non-critical lab and imaging results will be communicated to you by MyChart, letter or phone  "within 4 business days after the clinic has received the results. If you do not hear from us within 7 days, please contact the clinic through [employer] or phone. If you have a critical or abnormal lab result, we will notify you by phone as soon as possible.  Submit refill requests through [employer] or call your pharmacy and they will forward the refill request to us. Please allow 3 business days for your refill to be completed.          Additional Information About Your Visit        [employer] car easily [employer] Information     [employer] lets you send messages to your doctor, view your test results, renew your prescriptions, schedule appointments and more. To sign up, go to [email or web address] . Click on \"Log in\" on the left side of the screen, which will take you to the Welcome page. Then click on \"Sign up Now\" on the right side of the page.     You will be asked to enter the access code listed below, as well as some personal information. Please follow the directions to create your username and password.     Your access code is: [email or web address]  Expires: 2017 11:54 AM     Your access code will  in 90 days. If you need help or a new code, please call your [place] clinic or [phone].        Care EveryWhere ID     This is your Care EveryWhere ID. This could be used by other organizations to access your [place] medical records  [id number]         Blood Pressure from Last 3 Encounters:   17 102/66   16 110/66   16 116/64    Weight from Last 3 Encounters:   17 90.3 kg (199 lb)   16 89.8 kg (198 lb)   16 91.1 kg (200 lb 14.4 oz)              We Performed the Following     PM PHONE R STRIP EVAL UP TO 90 DAYS ([id number])        Primary Care Provider Office Phone # Fax #    Edmundo Roblero [phone]       [address] [address]        Equal Access to Services     ALDEN PASCAL AH: Mihir [place], justin león, qaybta " tiffani dent christopeyman beardjeanne chang ah. Rocio [facility] [phone].    ATENCIÓN: Si khari gaitna, tiene a mckeon disposición servicios gratuitos de asistencia lingüística. Nadia al [phone].    We comply with applicable federal civil rights laws and [place] laws. We do not discriminate on the basis of race, color, national origin, age, disability sex, sexual orientation or gender identity.            Thank you!     Thank you for choosing [facility] [place]  for your care. Our goal is always to provide you with excellent care. Hearing back from our patients is one way we can continue to improve our services. Please take a few minutes to complete the written survey that you may receive in the mail after your visit with us. Thank you!             Your Updated Medication List - Protect others around you: Learn how to safely use, store and throw away your medicines at www.disposemymeds.org.          This list is accurate as of: 9/20/17 11:54 AM.  Always use your most recent med list.                   Brand Name Dispense Instructions for use Diagnosis    amiodarone 200 MG tablet    PACERONE/CODARONE    45 tablet    Take 0.5 tablets (100 mg) by mouth daily 1/2 daily    Chronic atrial fibrillation (H)       apixaban ANTICOAGULANT 5 MG tablet    ELIQUIS    180 tablet    Take 1 tablet (5 mg) by mouth 2 times daily    Chronic atrial fibrillation (H)       celecoxib 200 MG capsule    celeBREX    90 capsule    Take 1 capsule by mouth daily.    Lumbar spinal stenosis       ezetimibe-simvastatin 10-40 MG per tablet    VYTORIN    90 tablet    Take 1 tablet by mouth At Bedtime    Mixed hyperlipidemia       FLOMAX 0.4 MG capsule   Generic drug:  tamsulosin      Take 0.4 mg by mouth daily        metoprolol 50 MG 24 hr tablet    TOPROL-XL    90 tablet    Take 1 tablet (50 mg) by mouth daily    ASCVD (arteriosclerotic cardiovascular disease)       NITROGLYCERIN SL      Place 0.4 mg  under the tongue every 5 minutes as needed for chest pain        senna-docusate 8.6-50 MG per tablet    SENOKOT-S;PERICOLACE    60 tablet    Take 1-2 tablets by mouth 2 times daily Continue while taking narcotic pain medications. Hold for loose stools.    Primary localized osteoarthrosis, pelvic region and thigh       TYLENOL PO      Take by mouth every 4 hours as needed        warfarin 2.5 MG tablet    COUMADIN    100 tablet    Take 2 tabs on Wednesdays and take 1 tab on all other days or as directed by INR clinic    Long term (current) use of anticoagulants

## 2017-10-03 ENCOUNTER — OFFICE VISIT (OUTPATIENT)
Dept: CARDIOLOGY | Facility: CLINIC | Age: 82
End: 2017-10-03
Attending: INTERNAL MEDICINE
Payer: MEDICARE

## 2017-10-03 VITALS
DIASTOLIC BLOOD PRESSURE: 70 MMHG | HEART RATE: 86 BPM | WEIGHT: 198.6 LBS | SYSTOLIC BLOOD PRESSURE: 113 MMHG | HEIGHT: 66 IN | BODY MASS INDEX: 31.92 KG/M2

## 2017-10-03 DIAGNOSIS — I48.91 ATRIAL FIBRILLATION, UNSPECIFIED TYPE (H): ICD-10-CM

## 2017-10-03 DIAGNOSIS — I10 ESSENTIAL HYPERTENSION WITH GOAL BLOOD PRESSURE LESS THAN 140/90: ICD-10-CM

## 2017-10-03 DIAGNOSIS — I25.10 ASCVD (ARTERIOSCLEROTIC CARDIOVASCULAR DISEASE): ICD-10-CM

## 2017-10-03 DIAGNOSIS — Z95.0 CARDIAC PACEMAKER IN SITU: ICD-10-CM

## 2017-10-03 DIAGNOSIS — I21.29 ACUTE MYOCARDIAL INFARCTION INVOLVING OTHER CORONARY ARTERY: ICD-10-CM

## 2017-10-03 LAB
ALT SERPL W P-5'-P-CCNC: <5 U/L (ref 5–30)
ANION GAP SERPL CALCULATED.3IONS-SCNC: 10.5 MMOL/L (ref 6–17)
BUN SERPL-MCNC: 20 MG/DL (ref 7–30)
CALCIUM SERPL-MCNC: 8.9 MG/DL (ref 8.5–10.5)
CHLORIDE SERPL-SCNC: 103 MMOL/L (ref 98–107)
CHOLEST SERPL-MCNC: 136 MG/DL
CO2 SERPL-SCNC: 28 MMOL/L (ref 23–29)
CREAT SERPL-MCNC: 1.27 MG/DL (ref 0.7–1.3)
GFR SERPL CREATININE-BSD FRML MDRD: 53 ML/MIN/1.7M2
GLUCOSE SERPL-MCNC: 70 MG/DL (ref 70–105)
HDLC SERPL-MCNC: 47 MG/DL
LDLC SERPL CALC-MCNC: 66 MG/DL
NONHDLC SERPL-MCNC: 89 MG/DL
POTASSIUM SERPL-SCNC: 4.5 MMOL/L (ref 3.5–5.1)
SODIUM SERPL-SCNC: 137 MMOL/L (ref 136–145)
TRIGL SERPL-MCNC: 113 MG/DL

## 2017-10-03 PROCEDURE — 36415 COLL VENOUS BLD VENIPUNCTURE: CPT | Performed by: INTERNAL MEDICINE

## 2017-10-03 PROCEDURE — 80048 BASIC METABOLIC PNL TOTAL CA: CPT | Performed by: INTERNAL MEDICINE

## 2017-10-03 PROCEDURE — 99214 OFFICE O/P EST MOD 30 MIN: CPT | Performed by: INTERNAL MEDICINE

## 2017-10-03 PROCEDURE — 84460 ALANINE AMINO (ALT) (SGPT): CPT | Performed by: INTERNAL MEDICINE

## 2017-10-03 PROCEDURE — 80061 LIPID PANEL: CPT | Performed by: INTERNAL MEDICINE

## 2017-10-03 NOTE — MR AVS SNAPSHOT
After Visit Summary   10/3/2017    Balta Drake    MRN: [id number]           Patient Information     Date Of Birth          10/23/1927        Visit Information        Provider Department      10/3/2017 1:45 PM Fran Galo MD [facility]        Today's Diagnoses     ASCVD (arteriosclerotic cardiovascular disease)        Essential hypertension with goal blood pressure less than 140/90        Atrial fibrillation, unspecified type (H)        Cardiac pacemaker in situ           Follow-ups after your visit        Additional Services     Follow-Up with Cardiologist                 Your next 10 appointments already scheduled     Dec 21, 2017 10:15 AM CST   Phone Device Check with GOINS TECH2   [facility] ([facility])    [address] [place] [id number] [phone]              Future tests that were ordered for you today     Open Future Orders        Priority Expected Expires Ordered    Hepatic panel Routine 4/1/2018 10/3/2018 10/3/2017    TSH Routine 4/1/2018 10/3/2018 10/3/2017    Basic metabolic panel Routine 4/1/2018 10/3/2018 10/3/2017    Pulmonary function test Routine 4/1/2018 10/3/2018 10/3/2017    XR Chest 1 View Routine 4/1/2018 10/3/2018 10/3/2017    Basic metabolic panel Routine 4/1/2018 10/3/2018 10/3/2017    Lipid Profile Routine 4/1/2018 10/3/2018 10/3/2017    ALT Routine 4/1/2018 10/3/2018 10/3/2017    Echocardiogram Routine 4/1/2018 10/3/2018 10/3/2017    Follow-Up with Cardiologist Routine 4/1/2018 10/3/2018 10/3/2017            Who to contact     If you have questions or need follow up information about today's clinic visit or your schedule please contact [facility] directly at [phone].  Normal or non-critical lab and imaging results will be communicated to you by MyChart, letter or phone within 4 business days after the  "clinic has received the results. If you do not hear from us within 7 days, please contact the clinic through [employer] or phone. If you have a critical or abnormal lab result, we will notify you by phone as soon as possible.  Submit refill requests through [employer] or call your pharmacy and they will forward the refill request to us. Please allow 3 business days for your refill to be completed.          Additional Information About Your Visit        [employer] Information     [employer] lets you send messages to your doctor, view your test results, renew your prescriptions, schedule appointments and more. To sign up, go to [email or web address] . Click on \"Log in\" on the left side of the screen, which will take you to the Welcome page. Then click on \"Sign up Now\" on the right side of the page.     You will be asked to enter the access code listed below, as well as some personal information. Please follow the directions to create your username and password.     Your access code is: [email or web address]  Expires: 2017 11:54 AM     Your access code will  in 90 days. If you need help or a new code, please call your [place] clinic or [phone].        Care EveryWhere ID     This is your Care EveryWhere ID. This could be used by other organizations to access your [place] medical records  [id number]        Your Vitals Were     Pulse Height BMI (Body Mass Index)             86 1.676 m (5' 6\") 32.05 kg/m2          Blood Pressure from Last 3 Encounters:   10/03/17 113/70   17 102/66   16 110/66    Weight from Last 3 Encounters:   10/03/17 90.1 kg (198 lb 9.6 oz)   17 90.3 kg (199 lb)   16 89.8 kg (198 lb)              We Performed the Following     Follow-Up with Cardiologist        Primary Care Provider Office Phone # Fax #    Edmundo Roblero [phone]       [place] [address]        Equal Access to Services     ALDEN PASCAL AH: Hadii " franco [place], warichieda luqadaha, qaybta kaalmada kaitlynn, waxmarcello micah christopeyman aguilarizaiahantonio chang stefan. So [facility] [phone].    ATENCIÓN: Si habla elliottañol, tiene a mckeon disposición servicios gratuitos de asistencia lingüística. Nadia al [phone].    We comply with applicable federal civil rights laws and [place] laws. We do not discriminate on the basis of race, color, national origin, age, disability, sex, sexual orientation, or gender identity.            Thank you!     Thank you for choosing [facility] [place]  for your care. Our goal is always to provide you with excellent care. Hearing back from our patients is one way we can continue to improve our services. Please take a few minutes to complete the written survey that you may receive in the mail after your visit with us. Thank you!             Your Updated Medication List - Protect others around you: Learn how to safely use, store and throw away your medicines at www.disposemymeds.org.          This list is accurate as of: 10/3/17  2:51 PM.  Always use your most recent med list.                   Brand Name Dispense Instructions for use Diagnosis    amiodarone 200 MG tablet    PACERONE/CODARONE    45 tablet    Take 0.5 tablets (100 mg) by mouth daily 1/2 daily    Chronic atrial fibrillation (H)       apixaban ANTICOAGULANT 5 MG tablet    ELIQUIS    180 tablet    Take 1 tablet (5 mg) by mouth 2 times daily    Chronic atrial fibrillation (H)       celecoxib 200 MG capsule    celeBREX    90 capsule    Take 1 capsule by mouth daily.    Lumbar spinal stenosis       ezetimibe-simvastatin 10-40 MG per tablet    VYTORIN    90 tablet    Take 1 tablet by mouth At Bedtime    Mixed hyperlipidemia       FLOMAX 0.4 MG capsule   Generic drug:  tamsulosin      Take 0.4 mg by mouth daily        metoprolol 50 MG 24 hr tablet    TOPROL-XL    90 tablet    Take 1 tablet (50 mg) by mouth daily    ASCVD (arteriosclerotic cardiovascular  disease)       senna-docusate 8.6-50 MG per tablet    SENOKOT-S;PERICOLACE    60 tablet    Take 1-2 tablets by mouth 2 times daily Continue while taking narcotic pain medications. Hold for loose stools.    Primary localized osteoarthrosis, pelvic region and thigh       TYLENOL PO      Take by mouth every 4 hours as needed

## 2017-10-04 NOTE — PROGRESS NOTES
HISTORY OF PRESENT ILLNESS:  The patient is an 89-year-old overweight white male hospitalized at [facility] in 2002 because of increased symptoms of chest discomfort associated with acute coronary syndrome.  He underwent cardiac catheterization and coronary angiography which demonstrated decreased flow in the first obtuse marginal branch of the circumflex.  He was treated with PTCA and stent in the marginal branch of the circumflex.  There was mild disease in the left anterior descending and right coronary artery at that time.  He was treated for sick sinus syndrome with evidence of tachybrady syndrome with paroxysmal atrial fibrillation with permanent pacemaker in [place] in the mid-2000s.  There is also a history of diverticulitis, GI upset, diarrhea, paroxysmal atrial fibrillation with isolated episodes of nonsustained ventricular tachycardia with up to a 4-beat run noted on Holter monitors in the past.  Most recent Cardiolite stress testing was performed in 2014 which showed a small to moderate-sized reversible anterolateral defect consistent with ischemia with moderate-sized partly reversible inferior/inferolateral defect consistent with nontransmural infarct with moderate jax-infarct ischemia.  Because of the significant multi-locations of ischemia, he underwent cardiac catheterization and was noted to have patent stents, good flow to the left main coronary artery.  There was diffuse disease in the circumflex marginal branch with ISABEL 3 flow.  He was felt to be a candidate for aggressive medical therapy.  The ramus intermediate branch was also noted to be closed.  The patient had echocardiography at the time of his last clinic visit that demonstrated a moderate reduction in overall left ventricular function with ejection fraction of 35%-40% with evidence of diastolic dysfunction.  There was a catheter pacemaker in the right ventricle.  There was mild aortic insufficiency, mild aortic root  dilatation as well as ascending aortic dilatation but no significant change from previous year's study.      PRESENT MEDICATIONS:  Include:   1.  Metoprolol XL 50 mg a day.   2.  Amiodarone 100 mg a day.     3.  Ezetimibe/simvastatin 10/40 mg 1 per day.   4.  Eliquis 5 mg twice a day.    5.  Senna as needed.   6.  Acetaminophen as needed.   7.  Flomax 0.4 mg a day.   8.  Celebrex 200 mg a day.   9.  Albuterol inhaler as needed.      The patient denies symptoms of PND, orthopnea, fevers, chills or sweats.  He is most limited by easy fatigability, general malaise and mild dyspnea on exertion.  He appears to be otherwise tolerating his medications.      PHYSICAL EXAMINATION:   VITAL SIGNS:  Showed blood pressure 113/70 with a heart rate of 80-90 and regular.  Weight was 198 pounds which is stable.   NECK:  Without jugular venous distention, carotid bruit or palpable thyroid.   CHEST:  Essentially clear to percussion and auscultation with a few scattered isolated crackles with slight dullness at the bases.   CARDIAC:  Revealed a fairly regular rhythm and 1/6 to 2/6 systolic murmur at the left sternal border with no significant radiation.  No diastolic murmur, rub or S3.   EXTREMITIES:  Without significant cyanosis with trace to 1+ edema bilaterally.      CLINICAL IMPRESSION:   1.  Stable cardiac condition.   2.  Ischemic heart disease status post PTCA and stent placed in the obtuse marginal branch of the circumflex coronary artery with repeat cardiac catheterization more recently demonstrating diffuse 2-vessel disease involving the circumflex with primary recommendation of medical therapy in 2014.    3.  Hyperlipidemia, at goal.   4.  Hypertension with adequate control.   5.  Significant degenerative joint disease and osteoarthritis with spinal stenosis causing significant pain.   6.  History of paroxysmal atrial fibrillation.   7.  Status post hip replacement surgery.   8.  History of permanent pacemaker insertion for  bradydysrhythmia.      LABORATORY DATA:  Showed cholesterol 136, HDL 47, LDL 66, triglycerides 113.  Sodium 137, potassium 4.5, BUN 20, creatinine 1.27 and ALT less than 5.       DISCUSSION:  The patient has actually done well considering the complexity of his cardiac status as well as his advanced age.  He is not complaining at this time as much about easy fatigability and general malaise.  He does have nocturia at night.  He has not had good sleep and technically does not do very well with nutrition because he is personally responsible for it on his own.  He will need followup of his amiodarone in the spring.  Has previously had normal liver and pulmonary function tests.  He will continue anticoagulation with close followup of serum lipids, basic metabolic panel and blood pressure.  Echocardiography will also be performed in 2018 to follow left ventricular function.      RECOMMENDATIONS:   1.  Continue present medications.   2.  Close followup of serum lipids, basic metabolic panel and blood pressure.   3.  Device followup.   4.  Anticoagulation.   5.  Diet and exercise as tolerated.   6.  Attempt to maintain sleep pattern.   7.  Echocardiogram in 6 months to follow left ventricular function.   8.  Routine medical followup.   9.  Amiodarone followup in 6 months.   10.  Cardiology followup in 6 months.      cc:   [place] [address]         [facility]AD[facility] CONNELL [facility], [facility]             D: 10/03/2017 23:49   T: 10/04/2017 12:34   MT: FRANKIE      Name:     VOLODYMYR GONZALES   MRN:      [id number]        Account:      [id number]   :      10/23/1927           Service Date: 10/03/2017      Document: [id number]

## 2017-12-21 ENCOUNTER — ALLIED HEALTH/NURSE VISIT (OUTPATIENT)
Dept: CARDIOLOGY | Facility: CLINIC | Age: 82
End: 2017-12-21
Payer: MEDICARE

## 2017-12-21 DIAGNOSIS — Z95.0 CARDIAC PACEMAKER IN SITU: Primary | ICD-10-CM

## 2017-12-21 PROCEDURE — 93293 PM PHONE R-STRIP DEVICE EVAL: CPT | Performed by: INTERNAL MEDICINE

## 2017-12-21 NOTE — NURSING NOTE
~ 90 day phone teletrace ~  Intrinsic rhythm at time of check. Magnet response WNL. F/U scheduled q 3 months.

## 2017-12-21 NOTE — MR AVS SNAPSHOT
"              After Visit Summary   12/21/2017    Balta Drake    MRN: [id number]           Patient Information     Date Of Birth          10/23/1927        Visit Information        Provider Department      12/21/2017 10:15 AM BUSTER CORDERO [facility]        Today's Diagnoses     Cardiac pacemaker in situ    -  1       Follow-ups after your visit        Your next 10 appointments already scheduled     Mar 22, 2018 10:15 AM CDT   Phone Device Check with BUSTER CORDERO   [facility] ([facility])    [address] [place] [id number] [phone]              Who to contact     If you have questions or need follow up information about today's clinic visit or your schedule please contact [facility] directly at [phone].  Normal or non-critical lab and imaging results will be communicated to you by [employer], letter or phone within 4 business days after the clinic has received the results. If you do not hear from us within 7 days, please contact the clinic through [employer] or phone. If you have a critical or abnormal lab result, we will notify you by phone as soon as possible.  Submit refill requests through [employer] or call your pharmacy and they will forward the refill request to us. Please allow 3 business days for your refill to be completed.          Additional Information About Your Visit        MyChart Information     [employer] lets you send messages to your doctor, view your test results, renew your prescriptions, schedule appointments and more. To sign up, go to [email or web address] . Click on \"Log in\" on the left side of the screen, which will take you to the Welcome page. Then click on \"Sign up Now\" on the right side of the page.     You will be asked to enter the access code listed below, as well as some personal information. Please follow the directions to create your " username and password.     Your access code is: [email or web address]  Expires: 3/21/2018 10:22 AM     Your access code will  in 90 days. If you need help or a new code, please call your [place] clinic or [phone].        Care EveryWhere ID     This is your Care EveryWhere ID. This could be used by other organizations to access your [place] medical records  [id number]         Blood Pressure from Last 3 Encounters:   10/03/17 113/70   17 102/66   16 110/66    Weight from Last 3 Encounters:   10/03/17 90.1 kg (198 lb 9.6 oz)   17 90.3 kg (199 lb)   16 89.8 kg (198 lb)              We Performed the Following     PM PHONE R STRIP EVAL UP TO 90 DAYS ([id number])        Primary Care Provider Office Phone # Fax #    Edmundo Roblero [phone]       [place] [address]        Equal Access to Services     [facility]: Hadii aad ku hadasho Soomaali, waaxda luqadaha, qaybta kaalmada adeegyada, waxmarcello chang . So [facility] [phone].    ATENCIÓN: Si habla español, tiene a mckeon disposición servicios gratuitos de asistencia lingüística. [facility] [phone].    We comply with applicable federal civil rights laws and [place] laws. We do not discriminate on the basis of race, color, national origin, age, disability, sex, sexual orientation, or gender identity.            Thank you!     Thank you for choosing [facility]  for your care. Our goal is always to provide you with excellent care. Hearing back from our patients is one way we can continue to improve our services. Please take a few minutes to complete the written survey that you may receive in the mail after your visit with us. Thank you!             Your Updated Medication List - Protect others around you: Learn how to safely use, store and throw away your medicines at www.disposemymeds.org.          This list is accurate  as of: 12/21/17 10:22 AM.  Always use your most recent med list.                   Brand Name Dispense Instructions for use Diagnosis    amiodarone 200 MG tablet    PACERONE/CODARONE    45 tablet    Take 0.5 tablets (100 mg) by mouth daily 1/2 daily    Chronic atrial fibrillation (H)       apixaban ANTICOAGULANT 5 MG tablet    ELIQUIS    180 tablet    Take 1 tablet (5 mg) by mouth 2 times daily    Chronic atrial fibrillation (H)       celecoxib 200 MG capsule    celeBREX    90 capsule    Take 1 capsule by mouth daily.    Lumbar spinal stenosis       ezetimibe-simvastatin 10-40 MG per tablet    VYTORIN    90 tablet    Take 1 tablet by mouth At Bedtime    Mixed hyperlipidemia       FLOMAX 0.4 MG capsule   Generic drug:  tamsulosin      Take 0.4 mg by mouth daily        metoprolol 50 MG 24 hr tablet    TOPROL-XL    90 tablet    Take 1 tablet (50 mg) by mouth daily    ASCVD (arteriosclerotic cardiovascular disease)       senna-docusate 8.6-50 MG per tablet    SENOKOT-S;PERICOLACE    60 tablet    Take 1-2 tablets by mouth 2 times daily Continue while taking narcotic pain medications. Hold for loose stools.    Primary localized osteoarthrosis, pelvic region and thigh       TYLENOL PO      Take by mouth every 4 hours as needed

## 2018-03-22 ENCOUNTER — ALLIED HEALTH/NURSE VISIT (OUTPATIENT)
Dept: CARDIOLOGY | Facility: CLINIC | Age: 83
End: 2018-03-22
Payer: MEDICARE

## 2018-03-22 DIAGNOSIS — Z95.0 CARDIAC PACEMAKER IN SITU: Primary | ICD-10-CM

## 2018-03-22 PROCEDURE — 93293 PM PHONE R-STRIP DEVICE EVAL: CPT | Performed by: INTERNAL MEDICINE

## 2018-03-22 NOTE — NURSING NOTE
phone teletrace ~  Intrinsic rhythm at time of check. Magnet response WNL. F/U scheduled q 3 months.  For annual threshold.

## 2018-03-22 NOTE — MR AVS SNAPSHOT
After Visit Summary   3/22/2018    Balta Drake    MRN: [id number]           Patient Information     Date Of Birth          10/23/1927        Visit Information        Provider Department      3/22/2018 10:15 AM GOINS TECH2 [facility]        Today's Diagnoses     Cardiac pacemaker in situ    -  1       Follow-ups after your visit        Your next 10 appointments already scheduled     May 17, 2018 10:30 AM CDT   Pulmonary Function with  Pulmonary Rehab 1   [facility] ([facility])    [address], [address]  [facility] [id number] [phone]           No Inhalers for 6 hours prior to test No Smoking 2 hours prior to test            May 17, 2018  1:00 PM CDT   XR CHEST 1 VIEW with SHXR3   [facility] ([facility])    [address] [place] [id number] [phone]           Please bring a list of your current medicines to your exam. (Include vitamins, minerals and over-thecounter medicines.) Leave your valuables at home.  Tell your doctor if there is a chance you may be pregnant.  You do not need to do anything special for this exam.            May 22, 2018 11:00 AM CDT   Ech Complete with SHCVECHR2   [facility] CV Echocardiography ([facility])    [address] [place] [address]  [address]  [facility] [id number] [phone]           1. Please bring or wear a comfortable two-piece outfit. 2. You may eat, drink and take your normal medicines. 3. For any questions that cannot be answered, please contact the ordering physician            May 22, 2018 12:10 PM CDT   LAB with GOINS LAB   [facility] [place] ([facility])    [address]  [facility] [id number] [phone]           Please do not eat 10-12 hours before your appointment if you are coming in fasting for labs on lipids,  "cholesterol, or glucose (sugar). This does not apply to pregnant women. Water, hot tea and black coffee (with nothing added) are okay. Do not drink other fluids, diet soda or chew gum.            May 22, 2018  2:15 PM CDT   Return Visit with Fran Galo MD   [facility] ([facility])    [address]  [facility] [address] [phone] OPT 2            Jun 27, 2018 11:15 AM CDT   Pacemaker Check with BUSTER CHÁVEZ   [facility] ([facility])    [address]  [facility] [address] [phone] OPT 2              Who to contact     If you have questions or need follow up information about today's clinic visit or your schedule please contact [facility] directly at [phone].  Normal or non-critical lab and imaging results will be communicated to you by LiveHotSpothart, letter or phone within 4 business days after the clinic has received the results. If you do not hear from us within 7 days, please contact the clinic through duuint or phone. If you have a critical or abnormal lab result, we will notify you by phone as soon as possible.  Submit refill requests through EarlyDoc or call your pharmacy and they will forward the refill request to us. Please allow 3 business days for your refill to be completed.          Additional Information About Your Visit        EarlyDoc Information     EarlyDoc lets you send messages to your doctor, view your test results, renew your prescriptions, schedule appointments and more. To sign up, go to [email or web address] . Click on \"Log in\" on the left side of the screen, which will take you to the Welcome page. Then click on \"Sign up Now\" on the right side of the page.     You will be asked to enter the access code listed below, as well as some personal information. Please follow the directions to create your username and " password.     Your access code is: [email or web address]  Expires: 2018 10:30 AM     Your access code will  in 90 days. If you need help or a new code, please call your [facility] or [phone].        Care EveryWhere ID     This is your Care EveryWhere ID. This could be used by other organizations to access your [facility] medical records  [id number]         Blood Pressure from Last 3 Encounters:   10/03/17 113/70   17 102/66   16 110/66    Weight from Last 3 Encounters:   10/03/17 90.1 kg (198 lb 9.6 oz)   17 90.3 kg (199 lb)   16 89.8 kg (198 lb)              We Performed the Following     PM PHONE R STRIP EVAL UP TO 90 DAYS ([phone])        Primary Care Provider Office Phone # Fax #    Edmundo Roblero [phone]       [place] [address]        Equal Access to Services     [facility]: Hadii aad ku hadasho Soomaali, waaxda luqadaha, qaybta kaalmada adeegyada, waxay micah chang . So [facility] [phone].    ATENCIÓN: Si habla español, tiene a mckeon disposición servicios gratuitos de asistencia lingüística. Llame al [phone].    We comply with applicable federal civil rights laws and [place] laws. We do not discriminate on the basis of race, color, national origin, age, disability, sex, sexual orientation, or gender identity.            Thank you!     Thank you for choosing [facility]  for your care. Our goal is always to provide you with excellent care. Hearing back from our patients is one way we can continue to improve our services. Please take a few minutes to complete the written survey that you may receive in the mail after your visit with us. Thank you!             Your Updated Medication List - Protect others around you: Learn how to safely use, store and throw away your medicines at www.disposemymeds.org.          This list is accurate as of 3/22/18  10:30 AM.  Always use your most recent med list.                   Brand Name Dispense Instructions for use Diagnosis    amiodarone 200 MG tablet    PACERONE/CODARONE    45 tablet    Take 0.5 tablets (100 mg) by mouth daily 1/2 daily    Chronic atrial fibrillation (H)       apixaban ANTICOAGULANT 5 MG tablet    ELIQUIS    180 tablet    Take 1 tablet (5 mg) by mouth 2 times daily    Chronic atrial fibrillation (H)       celecoxib 200 MG capsule    celeBREX    90 capsule    Take 1 capsule by mouth daily.    Lumbar spinal stenosis       ezetimibe-simvastatin 10-40 MG per tablet    VYTORIN    90 tablet    Take 1 tablet by mouth At Bedtime    Mixed hyperlipidemia       FLOMAX 0.4 MG capsule   Generic drug:  tamsulosin      Take 0.4 mg by mouth daily        metoprolol succinate 50 MG 24 hr tablet    TOPROL-XL    90 tablet    Take 1 tablet (50 mg) by mouth daily    ASCVD (arteriosclerotic cardiovascular disease)       senna-docusate 8.6-50 MG per tablet    SENOKOT-S;PERICOLACE    60 tablet    Take 1-2 tablets by mouth 2 times daily Continue while taking narcotic pain medications. Hold for loose stools.    Primary localized osteoarthrosis, pelvic region and thigh       TYLENOL PO      Take by mouth every 4 hours as needed

## 2018-04-27 ENCOUNTER — PRE VISIT (OUTPATIENT)
Dept: CARDIOLOGY | Facility: CLINIC | Age: 83
End: 2018-04-27

## 2018-04-27 DIAGNOSIS — I48.91 ATRIAL FIBRILLATION (H): Primary | ICD-10-CM

## 2018-05-17 ENCOUNTER — HOSPITAL ENCOUNTER (OUTPATIENT)
Dept: CARDIAC REHAB | Facility: CLINIC | Age: 83
Setting detail: THERAPIES SERIES
End: 2018-05-17
Attending: INTERNAL MEDICINE
Payer: MEDICARE

## 2018-05-17 DIAGNOSIS — Z95.0 CARDIAC PACEMAKER IN SITU: ICD-10-CM

## 2018-05-17 DIAGNOSIS — I10 ESSENTIAL HYPERTENSION WITH GOAL BLOOD PRESSURE LESS THAN 140/90: ICD-10-CM

## 2018-05-17 DIAGNOSIS — I48.91 ATRIAL FIBRILLATION, UNSPECIFIED TYPE (H): ICD-10-CM

## 2018-05-17 DIAGNOSIS — I25.10 ASCVD (ARTERIOSCLEROTIC CARDIOVASCULAR DISEASE): ICD-10-CM

## 2018-05-17 PROCEDURE — 94726 PLETHYSMOGRAPHY LUNG VOLUMES: CPT

## 2018-05-17 PROCEDURE — 94375 RESPIRATORY FLOW VOLUME LOOP: CPT

## 2018-05-17 PROCEDURE — 40001038 ZZH STATISTIC RESPIRATORY TESTING VISIT

## 2018-05-17 PROCEDURE — 94729 DIFFUSING CAPACITY: CPT

## 2018-05-22 ENCOUNTER — HOSPITAL ENCOUNTER (OUTPATIENT)
Dept: CARDIOLOGY | Facility: CLINIC | Age: 83
Discharge: HOME | End: 2018-05-22
Attending: INTERNAL MEDICINE | Admitting: INTERNAL MEDICINE
Payer: MEDICARE

## 2018-05-22 ENCOUNTER — OFFICE VISIT (OUTPATIENT)
Dept: CARDIOLOGY | Facility: CLINIC | Age: 83
End: 2018-05-22
Attending: INTERNAL MEDICINE
Payer: MEDICARE

## 2018-05-22 VITALS
DIASTOLIC BLOOD PRESSURE: 54 MMHG | HEART RATE: 72 BPM | BODY MASS INDEX: 31.34 KG/M2 | SYSTOLIC BLOOD PRESSURE: 102 MMHG | HEIGHT: 66 IN | WEIGHT: 195 LBS

## 2018-05-22 DIAGNOSIS — Z95.0 CARDIAC PACEMAKER IN SITU: ICD-10-CM

## 2018-05-22 DIAGNOSIS — I25.10 ASCVD (ARTERIOSCLEROTIC CARDIOVASCULAR DISEASE): ICD-10-CM

## 2018-05-22 DIAGNOSIS — I48.91 ATRIAL FIBRILLATION, UNSPECIFIED TYPE (H): ICD-10-CM

## 2018-05-22 DIAGNOSIS — I10 ESSENTIAL HYPERTENSION WITH GOAL BLOOD PRESSURE LESS THAN 140/90: ICD-10-CM

## 2018-05-22 DIAGNOSIS — I48.0 PAROXYSMAL ATRIAL FIBRILLATION (H): ICD-10-CM

## 2018-05-22 LAB
ALBUMIN SERPL-MCNC: 3.6 G/DL (ref 3.4–5)
ALP SERPL-CCNC: 101 U/L (ref 40–150)
ALT SERPL W P-5'-P-CCNC: 29 U/L (ref 0–70)
ANION GAP SERPL CALCULATED.3IONS-SCNC: 8 MMOL/L (ref 6–17)
AST SERPL W P-5'-P-CCNC: 21 U/L (ref 0–45)
BILIRUB DIRECT SERPL-MCNC: 0.2 MG/DL (ref 0–0.2)
BILIRUB SERPL-MCNC: 0.5 MG/DL (ref 0.2–1.3)
BUN SERPL-MCNC: 17 MG/DL (ref 7–30)
CALCIUM SERPL-MCNC: 9.2 MG/DL (ref 8.5–10.5)
CHLORIDE SERPL-SCNC: 103 MMOL/L (ref 98–107)
CHOLEST SERPL-MCNC: 119 MG/DL
CO2 SERPL-SCNC: 30 MMOL/L (ref 23–29)
CREAT SERPL-MCNC: 1.3 MG/DL (ref 0.7–1.3)
GFR SERPL CREATININE-BSD FRML MDRD: 52 ML/MIN/1.7M2
GLUCOSE SERPL-MCNC: 73 MG/DL (ref 70–105)
HDLC SERPL-MCNC: 56 MG/DL
LDLC SERPL CALC-MCNC: 50 MG/DL
NONHDLC SERPL-MCNC: 63 MG/DL
POTASSIUM SERPL-SCNC: 5 MMOL/L (ref 3.5–5.1)
PROT SERPL-MCNC: 6.9 G/DL (ref 6.8–8.8)
SODIUM SERPL-SCNC: 136 MMOL/L (ref 136–145)
TRIGL SERPL-MCNC: 64 MG/DL
TSH SERPL DL<=0.005 MIU/L-ACNC: 1.28 MU/L (ref 0.4–4)

## 2018-05-22 PROCEDURE — 80076 HEPATIC FUNCTION PANEL: CPT | Performed by: INTERNAL MEDICINE

## 2018-05-22 PROCEDURE — 36415 COLL VENOUS BLD VENIPUNCTURE: CPT | Performed by: INTERNAL MEDICINE

## 2018-05-22 PROCEDURE — 99214 OFFICE O/P EST MOD 30 MIN: CPT | Mod: 25 | Performed by: INTERNAL MEDICINE

## 2018-05-22 PROCEDURE — 93000 ELECTROCARDIOGRAM COMPLETE: CPT | Performed by: INTERNAL MEDICINE

## 2018-05-22 PROCEDURE — 80061 LIPID PANEL: CPT | Performed by: INTERNAL MEDICINE

## 2018-05-22 PROCEDURE — 84443 ASSAY THYROID STIM HORMONE: CPT | Performed by: INTERNAL MEDICINE

## 2018-05-22 PROCEDURE — 40000264 ECHO COMPLETE WITH OPTISON

## 2018-05-22 PROCEDURE — 93306 TTE W/DOPPLER COMPLETE: CPT | Mod: 26 | Performed by: INTERNAL MEDICINE

## 2018-05-22 PROCEDURE — 25500064 ZZH RX 255 OP 636: Performed by: INTERNAL MEDICINE

## 2018-05-22 PROCEDURE — 80048 BASIC METABOLIC PNL TOTAL CA: CPT | Performed by: INTERNAL MEDICINE

## 2018-05-22 RX ADMIN — HUMAN ALBUMIN MICROSPHERES AND PERFLUTREN 9 ML: 10; .22 INJECTION, SOLUTION INTRAVENOUS at 11:30

## 2018-05-22 NOTE — LETTER
5/22/2018      Edmundo Roblero MD  [address] [address]      RE: Balta Drake       Dear Colleague,    I had the pleasure of seeing Balta Drake in the [facility].    Service Date: 05/22/2018      HISTORY OF PRESENT ILLNESS:  The patient is a 89-year-old overweight white male hospitalized at [facility] in 2002 because of increased symptoms of chest discomfort associated with acute coronary syndrome.  He underwent cardiac catheterization and coronary angiography which demonstrated decreased flow in the first obtuse branch of the circumflex.  He was treated with PTCA and stent in the marginal branch of circumflex.  There was mild disease in the left anterior descending and right coronary artery at that time.  He was treated for sick sinus syndrome with evidence of tachybrady syndrome with paroxysmal atrial fibrillation with a permanent pacemaker in [place] in the mid 2000s.        There has also been a history of diverticulitis, GI upset, diarrhea, paroxysmal atrial fibrillation with isolated episodes of nonsustained ventricular tachycardia with up to a 4-beat run noted on Holter monitors in the past.        Most recent Cardiolite stress test performed in 2014 with a small to moderate size reversible anterolateral defect consistent with ischemia with moderate-sized, partly reversible inferior inferolateral defect consistent with nontransmural infarct with moderate jax-infarct ischemia.  Because of significant multiple locations of ischemia, he underwent cardiac catheterization and was noted to have patent stents and good flow to the left main coronary artery.  There was diffuse disease in the circumflex marginal branch with ISABEL 3 flow.  He was a candidate for aggressive medical therapy to be a ramus intermediate branch was also noted to be closed.  The patient had a recent echocardiography performed which demonstrated  moderately reduced left ventricular function with ejection fraction estimated 40%-45% with mild inferior inferolateral hypokinesis and septal and apical dyssynchrony.  There is no right ventricular size and function.  There was a pacemaker lead in the right ventricle and trace tricuspid insufficiency.  There was trileaflet aortic valve sclerosis with no significant stenosis.  There was mildly dilated aortic root and borderline dilatation of the ascending thoracic aorta.      MEDICATIONS:   1.  Acetaminophen 500 mg every 4 hours as needed.   2.  Amiodarone 100 mg a day.   3.  Apixaban 5 mg twice a day.   4.  Celebrex 200 mg a day.   5.  Ezetimibe/simvastatin (Vytorin) 10/40 mg 1 tablet a day.   6.  Metoprolol XL 50 mg a day.   7.  Senna 1-2 tablets as needed.   8.  Flomax 0.4 mg a day.      LABORATORY DATA:  Demonstrated a cholesterol 119, HDL 56, LDL 50, triglycerides 64.  Sodium 136, potassium 5.0, BUN 17, creatinine 1.3.  ALT 29.        The patient participates in most activities without significant restriction.  He is mostly restricted by spinal stenosis and lower back pain.  He denies symptoms of PND, orthopnea, fever, chills or sweats.  He does complain of easy fatigability and general malaise and mild dyspnea on exertion.  He appears to be tolerating his medications well without side effect.      PHYSICAL EXAMINATION:   VITAL SIGNS:  Blood pressure of 102/54 with a heart rate of 70-80 and regular.  Weight was 195 pounds, down 3 pounds from previous clinic visit.   NECK:  Essentially without jugular venous distention, carotid bruit or palpable thyroid.     CHEST:  Essentially clear to percussion and auscultation with a few isolated crackles and slight dullness at the bases.   CARDIAC:  Regular rhythm, 1-2/6 systolic murmur at the left sternal border with minimal radiation.  No diastolic murmur, rub or S3.   EXTREMITIES:  Without cyanosis with trace to 1+ edema bilaterally.      CLINICAL IMPRESSION:   1.  Stable  cardiac condition.   2.  Ischemic heart disease, status post PTCA and stent placed in the obtuse marginal branch and circumflex coronary artery with repeat cardiac catheterization showing diffuse 2-vessel disease involving circumflex with primary recommendation medical therapy in 2014.   3.  Hyperlipidemia, at goal.   4.  Hypertension, adequate control.   5.  Significant degenerative joint disease and osteoarthritis with spinal stenosis causing significant pain.   6.  History of paroxysmal atrial fibrillation.   7.  Status post hip replacement surgery.   8.  History of permanent pacemaker insertion for bradydysrhythmia.      DISCUSSION:  The patient has done well considering the complexity of his cardiac status as well as his advanced age.  He is not complaining of angina pectoris or congestive heart failure at this time.  He is mostly limited by easy fatigability, general malaise and mild nocturia at night.  He does have adequate sleep.  Liver and pulmonary function tests are within normal limits.  He will have followup for his amiodarone.  He will continue anticoagulation with close followup of serum lipids, basic metabolic panel and blood pressure.  Echocardiography remain stable without much change.      RECOMMENDATIONS:   1.  Continue present medication.   2.  Close followup of serum lipids, basic metabolic panel and blood pressure with followup with Asael Long in 3 months.   3.  Device followup.   4.  Anticoagulation.   5.  Diet and exercise as tolerated.   6.  Consider sleep evaluation if insomnia persists.   7.  Routine medical followup.   8.  Cardiology followup in 6 months.      cc:      MD Arnel Patel   [address] [place] [id number]         MADDI CONNELL MD, [facility]             D: 2018   T: 2018   MT: REGLA      Name:     VOLODYMYR GONZALES   MRN:      [id number]        Account:      [id number]   :      10/23/1927           Service Date: 2018      Document:  [id number]       Outpatient Encounter Prescriptions as of 5/22/2018   Medication Sig Dispense Refill     Acetaminophen (TYLENOL PO) Take by mouth every 4 hours as needed        amiodarone (PACERONE/CODARONE) 200 MG tablet Take 0.5 tablets (100 mg) by mouth daily 1/2 daily 45 tablet 3     apixaban ANTICOAGULANT (ELIQUIS) 5 MG tablet Take 1 tablet (5 mg) by mouth 2 times daily 180 tablet 3     celecoxib (CELEBREX) 200 MG capsule Take 1 capsule by mouth daily. 90 capsule 3     ezetimibe-simvastatin (VYTORIN) 10-40 MG per tablet Take 1 tablet by mouth At Bedtime 90 tablet 3     metoprolol (TOPROL-XL) 50 MG 24 hr tablet Take 1 tablet (50 mg) by mouth daily 90 tablet 2     senna-docusate (SENOKOT-S;PERICOLACE) 8.6-50 MG per tablet Take 1-2 tablets by mouth 2 times daily Continue while taking narcotic pain medications. Hold for loose stools. 60 tablet 1     tamsulosin (FLOMAX) 0.4 MG 24 hr capsule Take 0.4 mg by mouth daily       Facility-Administered Encounter Medications as of 5/22/2018   Medication Dose Route Frequency Provider Last Rate Last Dose     albuterol (PROAIR HFA, PROVENTIL HFA, VENTOLIN HFA) inhaler 2 puff  2 puff Inhalation Q5 Min PRN Ip, Armando Hummel MD         aminophylline injection  mg   mg Intravenous Once PRN Ip, Armando Hummel MD         sodium chloride (PF) 0.9% PF flush 10 mL  10 mL Intravenous Q10 Min PRN Ip, Armando Hummel MD         sodium chloride (PF) 0.9% PF flush 5-10 mL  5-10 mL Intravenous q1 min prn Ip, Armando Hummel MD   10 mL at 06/11/14 1328     sodium chloride bacteriostatic 0.9 % flush 0-1 mL  0-1 mL Intradermal Once PRN Ip, Armando Hummel MD         [DISCONTINUED] sodium chloride (PF) 0.9% PF flush 10 mL  10 mL Intravenous Once Fran Galo MD           Again, thank you for allowing me to participate in the care of your patient.      Sincerely,    Fran Galo MD     [facility]

## 2018-05-22 NOTE — PROGRESS NOTES
Service Date: 05/22/2018      HISTORY OF PRESENT ILLNESS:  The patient is a 89-year-old overweight white male hospitalized at [facility] in 2002 because of increased symptoms of chest discomfort associated with acute coronary syndrome.  He underwent cardiac catheterization and coronary angiography which demonstrated decreased flow in the first obtuse branch of the circumflex.  He was treated with PTCA and stent in the marginal branch of circumflex.  There was mild disease in the left anterior descending and right coronary artery at that time.  He was treated for sick sinus syndrome with evidence of tachybrady syndrome with paroxysmal atrial fibrillation with a permanent pacemaker in [place] in the mid 2000s.        There has also been a history of diverticulitis, GI upset, diarrhea, paroxysmal atrial fibrillation with isolated episodes of nonsustained ventricular tachycardia with up to a 4-beat run noted on Holter monitors in the past.        Most recent Cardiolite stress test performed in 2014 with a small to moderate size reversible anterolateral defect consistent with ischemia with moderate-sized, partly reversible inferior inferolateral defect consistent with nontransmural infarct with moderate jax-infarct ischemia.  Because of significant multiple locations of ischemia, he underwent cardiac catheterization and was noted to have patent stents and good flow to the left main coronary artery.  There was diffuse disease in the circumflex marginal branch with ISABEL 3 flow.  He was a candidate for aggressive medical therapy to be a ramus intermediate branch was also noted to be closed.  The patient had a recent echocardiography performed which demonstrated moderately reduced left ventricular function with ejection fraction estimated 40%-45% with mild inferior inferolateral hypokinesis and septal and apical dyssynchrony.  There is no right ventricular size and function.  There was a pacemaker lead in  the right ventricle and trace tricuspid insufficiency.  There was trileaflet aortic valve sclerosis with no significant stenosis.  There was mildly dilated aortic root and borderline dilatation of the ascending thoracic aorta.      MEDICATIONS:   1.  Acetaminophen 500 mg every 4 hours as needed.   2.  Amiodarone 100 mg a day.   3.  Apixaban 5 mg twice a day.   4.  Celebrex 200 mg a day.   5.  Ezetimibe/simvastatin (Vytorin) 10/40 mg 1 tablet a day.   6.  Metoprolol XL 50 mg a day.   7.  Senna 1-2 tablets as needed.   8.  Flomax 0.4 mg a day.      LABORATORY DATA:  Demonstrated a cholesterol 119, HDL 56, LDL 50, triglycerides 64.  Sodium 136, potassium 5.0, BUN 17, creatinine 1.3.  ALT 29.        The patient participates in most activities without significant restriction.  He is mostly restricted by spinal stenosis and lower back pain.  He denies symptoms of PND, orthopnea, fever, chills or sweats.  He does complain of easy fatigability and general malaise and mild dyspnea on exertion.  He appears to be tolerating his medications well without side effect.      PHYSICAL EXAMINATION:   VITAL SIGNS:  Blood pressure of 102/54 with a heart rate of 70-80 and regular.  Weight was 195 pounds, down 3 pounds from previous clinic visit.   NECK:  Essentially without jugular venous distention, carotid bruit or palpable thyroid.     CHEST:  Essentially clear to percussion and auscultation with a few isolated crackles and slight dullness at the bases.   CARDIAC:  Regular rhythm, 1-2/6 systolic murmur at the left sternal border with minimal radiation.  No diastolic murmur, rub or S3.   EXTREMITIES:  Without cyanosis with trace to 1+ edema bilaterally.      CLINICAL IMPRESSION:   1.  Stable cardiac condition.   2.  Ischemic heart disease, status post PTCA and stent placed in the obtuse marginal branch and circumflex coronary artery with repeat cardiac catheterization showing diffuse 2-vessel disease involving circumflex with primary  recommendation medical therapy in .   3.  Hyperlipidemia, at goal.   4.  Hypertension, adequate control.   5.  Significant degenerative joint disease and osteoarthritis with spinal stenosis causing significant pain.   6.  History of paroxysmal atrial fibrillation.   7.  Status post hip replacement surgery.   8.  History of permanent pacemaker insertion for bradydysrhythmia.      DISCUSSION:  The patient has done well considering the complexity of his cardiac status as well as his advanced age.  He is not complaining of angina pectoris or congestive heart failure at this time.  He is mostly limited by easy fatigability, general malaise and mild nocturia at night.  He does have adequate sleep.  Liver and pulmonary function tests are within normal limits.  He will have followup for his amiodarone.  He will continue anticoagulation with close followup of serum lipids, basic metabolic panel and blood pressure.  Echocardiography remain stable without much change.      RECOMMENDATIONS:   1.  Continue present medication.   2.  Close followup of serum lipids, basic metabolic panel and blood pressure with followup with Asael Long in 3 months.   3.  Device followup.   4.  Anticoagulation.   5.  Diet and exercise as tolerated.   6.  Consider sleep evaluation if insomnia persists.   7.  Routine medical followup.   8.  Cardiology followup in 6 months.      cc:      MD Arnel Patel   [address] [place] [id number]         MADDI CONNELL MD, [facility]             D: 2018   T: 2018   MT: REGLA      Name:     VOLODYMYR GONZALES   MRN:      [id number]        Account:      [id number]   :      10/23/1927           Service Date: 2018      Document: [id number]

## 2018-05-22 NOTE — MR AVS SNAPSHOT
After Visit Summary   5/22/2018    Balta Drake    MRN: [id number]           Patient Information     Date Of Birth          10/23/1927        Visit Information        Provider Department      5/22/2018 2:15 PM Fran Galo MD [facility]        Today's Diagnoses     ASCVD (arteriosclerotic cardiovascular disease)        Essential hypertension with goal blood pressure less than 140/90        Atrial fibrillation, unspecified type (H)        Cardiac pacemaker in situ        Paroxysmal atrial fibrillation (H)           Follow-ups after your visit        Additional Services     Follow-Up with Cardiac Advanced Practice Provider           Follow-Up with Cardiologist                 Your next 10 appointments already scheduled     Jun 27, 2018 11:15 AM CDT   Pacemaker Check with GOINS SAIRA   [facility] ([facility])    [address] [place] [id number] [phone] OPT 2              Future tests that were ordered for you today     Open Future Orders        Priority Expected Expires Ordered    Basic metabolic panel Routine 11/18/2018 5/22/2019 5/22/2018    Lipid Profile Routine 11/18/2018 5/22/2019 5/22/2018    ALT Routine 11/18/2018 5/22/2019 5/22/2018    Follow-Up with Cardiologist Routine 11/18/2018 5/22/2019 5/22/2018    Basic metabolic panel Routine 8/20/2018 5/22/2019 5/22/2018    Follow-Up with Cardiac Advanced Practice Provider Routine 8/20/2018 5/22/2019 5/22/2018    ECHO COMPLETE WITH OPTISON Routine 5/22/2018 10/3/2018 10/3/2017            Who to contact     If you have questions or need follow up information about today's clinic visit or your schedule please contact [facility] directly at [phone].  Normal or non-critical lab and imaging results will be communicated to you by MyChart, letter or phone within 4 business days after the clinic  "has received the results. If you do not hear from us within 7 days, please contact the clinic through [employer] or phone. If you have a critical or abnormal lab result, we will notify you by phone as soon as possible.  Submit refill requests through [employer] or call your pharmacy and they will forward the refill request to us. Please allow 3 business days for your refill to be completed.          Additional Information About Your Visit        Game[employer]inny[employer]Cast Information     [employer] lets you send messages to your doctor, view your test results, renew your prescriptions, schedule appointments and more. To sign up, go to [email or web address] . Click on \"Log in\" on the left side of the screen, which will take you to the Welcome page. Then click on \"Sign up Now\" on the right side of the page.     You will be asked to enter the access code listed below, as well as some personal information. Please follow the directions to create your username and password.     Your access code is: [email or web address]  Expires: 2018 10:30 AM     Your access code will  in 90 days. If you need help or a new code, please call your [place] clinic or [phone].        Care EveryWhere ID     This is your Care EveryWhere ID. This could be used by other organizations to access your [place] medical records  [id number]        Your Vitals Were     Pulse Height BMI (Body Mass Index)             72 1.676 m (5' 6\") 31.47 kg/m2          Blood Pressure from Last 3 Encounters:   18 102/54   10/03/17 113/70   17 102/66    Weight from Last 3 Encounters:   18 88.5 kg (195 lb)   10/03/17 90.1 kg (198 lb 9.6 oz)   17 90.3 kg (199 lb)              We Performed the Following     EKG 12-lead complete w/read - Clinics (to be scheduled)     Follow-Up with Cardiologist        Primary Care Provider Office Phone # Fax #    Edmundo Roblero [phone]       [place] [address]      "   Equal Access to Services     [facility]: Hadii aad ku hadsharlao Soziggy, waaxda luqadaha, qaybta kaalmada chiragelenibob, tiffani chang . So [facility] [phone].    ATENCIÓN: Si habla español, tiene a mckeon disposición servicios gratuitos de asistencia lingüística. Torresame al [phone].    We comply with applicable federal civil rights laws and [place] laws. We do not discriminate on the basis of race, color, national origin, age, disability, sex, sexual orientation, or gender identity.            Thank you!     Thank you for choosing [facility]  for your care. Our goal is always to provide you with excellent care. Hearing back from our patients is one way we can continue to improve our services. Please take a few minutes to complete the written survey that you may receive in the mail after your visit with us. Thank you!             Your Updated Medication List - Protect others around you: Learn how to safely use, store and throw away your medicines at www.disposemymeds.org.          This list is accurate as of 5/22/18  2:52 PM.  Always use your most recent med list.                   Brand Name Dispense Instructions for use Diagnosis    amiodarone 200 MG tablet    PACERONE/CODARONE    45 tablet    Take 0.5 tablets (100 mg) by mouth daily 1/2 daily    Chronic atrial fibrillation (H)       apixaban ANTICOAGULANT 5 MG tablet    ELIQUIS    180 tablet    Take 1 tablet (5 mg) by mouth 2 times daily    Chronic atrial fibrillation (H)       celecoxib 200 MG capsule    celeBREX    90 capsule    Take 1 capsule by mouth daily.    Lumbar spinal stenosis       ezetimibe-simvastatin 10-40 MG per tablet    VYTORIN    90 tablet    Take 1 tablet by mouth At Bedtime    Mixed hyperlipidemia       FLOMAX 0.4 MG capsule   Generic drug:  tamsulosin      Take 0.4 mg by mouth daily        metoprolol succinate 50 MG 24 hr tablet    TOPROL-XL    90 tablet    Take 1 tablet (50  mg) by mouth daily    ASCVD (arteriosclerotic cardiovascular disease)       senna-docusate 8.6-50 MG per tablet    SENOKOT-S;PERICOLACE    60 tablet    Take 1-2 tablets by mouth 2 times daily Continue while taking narcotic pain medications. Hold for loose stools.    Primary localized osteoarthrosis, pelvic region and thigh       TYLENOL PO      Take by mouth every 4 hours as needed

## 2018-05-22 NOTE — LETTER
5/22/2018    Edmundo Roblero MD  [address] [address]    RE: Balta Drake       Dear Colleague,    I had the pleasure of seeing Balta Drake in the [facility].    Service Date: 05/22/2018      HISTORY OF PRESENT ILLNESS:  The patient is a 89-year-old overweight white male hospitalized at [facility] in 2002 because of increased symptoms of chest discomfort associated with acute coronary syndrome.  He underwent cardiac catheterization and coronary angiography which demonstrated decreased flow in the first obtuse branch of the circumflex.  He was treated with PTCA and stent in the marginal branch of circumflex.  There was mild disease in the left anterior descending and right coronary artery at that time.  He was treated for sick sinus syndrome with evidence of tachybrady syndrome with paroxysmal atrial fibrillation with a permanent pacemaker in [place] in the mid 2000s.        There has also been a history of diverticulitis, GI upset, diarrhea, paroxysmal atrial fibrillation with isolated episodes of nonsustained ventricular tachycardia with up to a 4-beat run noted on Holter monitors in the past.        Most recent Cardiolite stress test performed in 2014 with a small to moderate size reversible anterolateral defect consistent with ischemia with moderate-sized, partly reversible inferior inferolateral defect consistent with nontransmural infarct with moderate jax-infarct ischemia.  Because of significant multiple locations of ischemia, he underwent cardiac catheterization and was noted to have patent stents and good flow to the left main coronary artery.  There was diffuse disease in the circumflex marginal branch with ISABEL 3 flow.  He was a candidate for aggressive medical therapy to be a ramus intermediate branch was also noted to be closed.  The patient had a recent echocardiography performed which demonstrated  moderately reduced left ventricular function with ejection fraction estimated 40%-45% with mild inferior inferolateral hypokinesis and septal and apical dyssynchrony.  There is no right ventricular size and function.  There was a pacemaker lead in the right ventricle and trace tricuspid insufficiency.  There was trileaflet aortic valve sclerosis with no significant stenosis.  There was mildly dilated aortic root and borderline dilatation of the ascending thoracic aorta.      MEDICATIONS:   1.  Acetaminophen 500 mg every 4 hours as needed.   2.  Amiodarone 100 mg a day.   3.  Apixaban 5 mg twice a day.   4.  Celebrex 200 mg a day.   5.  Ezetimibe/simvastatin (Vytorin) 10/40 mg 1 tablet a day.   6.  Metoprolol XL 50 mg a day.   7.  Senna 1-2 tablets as needed.   8.  Flomax 0.4 mg a day.      LABORATORY DATA:  Demonstrated a cholesterol 119, HDL 56, LDL 50, triglycerides 64.  Sodium 136, potassium 5.0, BUN 17, creatinine 1.3.  ALT 29.        The patient participates in most activities without significant restriction.  He is mostly restricted by spinal stenosis and lower back pain.  He denies symptoms of PND, orthopnea, fever, chills or sweats.  He does complain of easy fatigability and general malaise and mild dyspnea on exertion.  He appears to be tolerating his medications well without side effect.      PHYSICAL EXAMINATION:   VITAL SIGNS:  Blood pressure of 102/54 with a heart rate of 70-80 and regular.  Weight was 195 pounds, down 3 pounds from previous clinic visit.   NECK:  Essentially without jugular venous distention, carotid bruit or palpable thyroid.     CHEST:  Essentially clear to percussion and auscultation with a few isolated crackles and slight dullness at the bases.   CARDIAC:  Regular rhythm, 1-2/6 systolic murmur at the left sternal border with minimal radiation.  No diastolic murmur, rub or S3.   EXTREMITIES:  Without cyanosis with trace to 1+ edema bilaterally.      CLINICAL IMPRESSION:   1.  Stable  cardiac condition.   2.  Ischemic heart disease, status post PTCA and stent placed in the obtuse marginal branch and circumflex coronary artery with repeat cardiac catheterization showing diffuse 2-vessel disease involving circumflex with primary recommendation medical therapy in 2014.   3.  Hyperlipidemia, at goal.   4.  Hypertension, adequate control.   5.  Significant degenerative joint disease and osteoarthritis with spinal stenosis causing significant pain.   6.  History of paroxysmal atrial fibrillation.   7.  Status post hip replacement surgery.   8.  History of permanent pacemaker insertion for bradydysrhythmia.      DISCUSSION:  The patient has done well considering the complexity of his cardiac status as well as his advanced age.  He is not complaining of angina pectoris or congestive heart failure at this time.  He is mostly limited by easy fatigability, general malaise and mild nocturia at night.  He does have adequate sleep.  Liver and pulmonary function tests are within normal limits.  He will have followup for his amiodarone.  He will continue anticoagulation with close followup of serum lipids, basic metabolic panel and blood pressure.  Echocardiography remain stable without much change.      RECOMMENDATIONS:   1.  Continue present medication.   2.  Close followup of serum lipids, basic metabolic panel and blood pressure with followup with Asael Long in 3 months.   3.  Device followup.   4.  Anticoagulation.   5.  Diet and exercise as tolerated.   6.  Consider sleep evaluation if insomnia persists.   7.  Routine medical followup.   8.  Cardiology followup in 6 months.      cc:      MD Arnel Patel   [address] [place] [id number]         MADDI CONNELL MD, [facility]             D: 2018   T: 2018   MT: REGLA      Name:     VOLODYMYR GONZALES   MRN:      [id number]        Account:      [id number]   :      10/23/1927           Service Date: 2018      Document:  [id number]       Thank you for allowing me to participate in the care of your patient.      Sincerely,     Fran Galo MD     [facility]    cc:   Fran Galo MD  [address]  [place] [id number]

## 2018-05-24 ENCOUNTER — ALLIED HEALTH/NURSE VISIT (OUTPATIENT)
Dept: CARDIOLOGY | Facility: CLINIC | Age: 83
End: 2018-05-24
Payer: MEDICARE

## 2018-05-24 DIAGNOSIS — Z95.0 CARDIAC PACEMAKER IN SITU: Primary | ICD-10-CM

## 2018-05-24 PROCEDURE — 99207 ZZC NO CHARGE LOS: CPT

## 2018-05-24 NOTE — MR AVS SNAPSHOT
After Visit Summary   5/24/2018    Balta Drake    MRN: [id number]           Patient Information     Date Of Birth          10/23/1927        Visit Information        Provider Department      5/24/2018 9:45 AM GOINS TECH2 [facility]        Today's Diagnoses     Cardiac pacemaker in situ    -  1       Follow-ups after your visit        Your next 10 appointments already scheduled     Jun 27, 2018 11:15 AM CDT   Pacemaker Check with GOINS DCRN   [facility] ([facility])    [address] [place] [id number] [phone] OPT 2            Sep 05, 2018 10:00 AM CDT   LAB with GOINS LAB   [facility] [place] ([facility])    [address] [place] [id number] [phone]           Please do not eat 10-12 hours before your appointment if you are coming in fasting for labs on lipids, cholesterol, or glucose (sugar). This does not apply to pregnant women. Water, hot tea and black coffee (with nothing added) are okay. Do not drink other fluids, diet soda or chew gum.            Sep 05, 2018 11:00 AM CDT   Return Visit with Asael Gresham PA-C   [facility] ([facility])    [address] [place] [id number] [phone] OPT 2              Who to contact     If you have questions or need follow up information about today's clinic visit or your schedule please contact [facility] directly at [phone].  Normal or non-critical lab and imaging results will be communicated to you by MyChart, letter or phone within 4 business days after the clinic has received the results. If you do not hear from us within 7 days, please contact the clinic through MyChart or phone. If you have a critical or abnormal lab result, we will notify you by phone as soon  "as possible.  Submit refill requests through [employer] or call your pharmacy and they will forward the refill request to us. Please allow 3 business days for your refill to be completed.          Additional Information About Your Visit        [email or web address] Reading Information     [employer] lets you send messages to your doctor, view your test results, renew your prescriptions, schedule appointments and more. To sign up, go to [email or web address] . Click on \"Log in\" on the left side of the screen, which will take you to the Welcome page. Then click on \"Sign up Now\" on the right side of the page.     You will be asked to enter the access code listed below, as well as some personal information. Please follow the directions to create your username and password.     Your access code is: [email or web address]  Expires: 2018 10:30 AM     Your access code will  in 90 days. If you need help or a new code, please call your [place] clinic or [phone].        Care EveryWhere ID     This is your Care EveryWhere ID. This could be used by other organizations to access your [place] medical records  [id number]         Blood Pressure from Last 3 Encounters:   18 102/54   10/03/17 113/70   17 102/66    Weight from Last 3 Encounters:   18 88.5 kg (195 lb)   10/03/17 90.1 kg (198 lb 9.6 oz)   17 90.3 kg (199 lb)              Today, you had the following     No orders found for display       Primary Care Provider Office Phone # Fax #    Edmundo Roblero [phone]       [address] [address]        Equal Access to Services     [facility]: Hadii franco [place], warichieda luqadaha, qaybta kaalmada kaitlynn, tiffani aviles. So [facility] [phone].    ATENCIÓN: Si habla español, tiene a mckeon disposición servicios gratuitos de asistencia lingüística. Llame al [phone].    We comply with applicable federal civil rights laws and " [place] laws. We do not discriminate on the basis of race, color, national origin, age, disability, sex, sexual orientation, or gender identity.            Thank you!     Thank you for choosing [facility]  for your care. Our goal is always to provide you with excellent care. Hearing back from our patients is one way we can continue to improve our services. Please take a few minutes to complete the written survey that you may receive in the mail after your visit with us. Thank you!             Your Updated Medication List - Protect others around you: Learn how to safely use, store and throw away your medicines at www.disposemymeds.org.          This list is accurate as of 5/24/18  9:55 AM.  Always use your most recent med list.                   Brand Name Dispense Instructions for use Diagnosis    amiodarone 200 MG tablet    PACERONE/CODARONE    45 tablet    Take 0.5 tablets (100 mg) by mouth daily 1/2 daily    Chronic atrial fibrillation (H)       apixaban ANTICOAGULANT 5 MG tablet    ELIQUIS    180 tablet    Take 1 tablet (5 mg) by mouth 2 times daily    Chronic atrial fibrillation (H)       celecoxib 200 MG capsule    celeBREX    90 capsule    Take 1 capsule by mouth daily.    Lumbar spinal stenosis       ezetimibe-simvastatin 10-40 MG per tablet    VYTORIN    90 tablet    Take 1 tablet by mouth At Bedtime    Mixed hyperlipidemia       FLOMAX 0.4 MG capsule   Generic drug:  tamsulosin      Take 0.4 mg by mouth daily        metoprolol succinate 50 MG 24 hr tablet    TOPROL-XL    90 tablet    Take 1 tablet (50 mg) by mouth daily    ASCVD (arteriosclerotic cardiovascular disease)       senna-docusate 8.6-50 MG per tablet    SENOKOT-S;PERICOLACE    60 tablet    Take 1-2 tablets by mouth 2 times daily Continue while taking narcotic pain medications. Hold for loose stools.    Primary localized osteoarthrosis, pelvic region and thigh       TYLENOL PO      Take by mouth every 4  hours as needed

## 2018-05-24 NOTE — NURSING NOTE
PHONE TELETRACE, COURTESY CHECK, NO CHARGE.   Appropriate pacemaker function at time of check. Magnet response WNL. F/U  For annual threshold next month, already scheduled.

## 2018-05-25 ENCOUNTER — TELEPHONE (OUTPATIENT)
Dept: CARDIOLOGY | Facility: CLINIC | Age: 83
End: 2018-05-25

## 2018-05-25 NOTE — TELEPHONE ENCOUNTER
Pacemaker phone check today shows AP/VS and magnet rate WNL. He has an AV delay that will prolong to 400 ms to allow intrinsic vent conduction. This appears on the EKG and mimics atrial loss of capture. But PPM function is appropriate. He has office check in June. Will review with Dr Galo. ELINOR

## 2018-06-01 ENCOUNTER — DOCUMENTATION ONLY (OUTPATIENT)
Dept: CARDIOLOGY | Facility: CLINIC | Age: 83
End: 2018-06-01

## 2018-06-01 LAB
DLCOUNC-%PRED-PRE: 80 %
DLCOUNC-PRE: 15.64 ML/MIN/MMHG
DLCOUNC-PRED: 19.34 ML/MIN/MMHG
ERV-%PRED-PRE: 243 %
ERV-PRE: 0.44 L
ERV-PRED: 0.18 L
EXPTIME-PRE: 8.29 SEC
FEF2575-PRE: 2.54 L/SEC
FEFMAX-%PRED-PRE: 143 %
FEFMAX-PRE: 6.72 L/SEC
FEFMAX-PRED: 4.68 L/SEC
FEV1-%PRED-PRE: 122 %
FEV1-PRE: 2.52 L
FEV1FEV6-PRE: 82 %
FEV1FEV6-PRED: 75 %
FEV1FVC-PRE: 80 %
FEV1FVC-PRED: 69 %
FEV1SVC-PRE: 77 %
FEV1SVC-PRED: 60 %
FIFMAX-PRE: 3.14 L/SEC
FRCPLETH-%PRED-PRE: 77 %
FRCPLETH-PRE: 2.78 L
FRCPLETH-PRED: 3.59 L
FVC-%PRED-PRE: 113 %
FVC-PRE: 3.13 L
FVC-PRED: 2.75 L
IC-%PRED-PRE: 87 %
IC-PRE: 2.81 L
IC-PRED: 3.23 L
RVPLETH-%PRED-PRE: 79 %
RVPLETH-PRE: 2.32 L
RVPLETH-PRED: 2.93 L
TLCPLETH-%PRED-PRE: 91 %
TLCPLETH-PRE: 5.6 L
TLCPLETH-PRED: 6.11 L
VA-%PRED-PRE: 92 %
VA-PRE: 5.4 L
VC-%PRED-PRE: 96 %
VC-PRE: 3.28 L
VC-PRED: 3.41 L

## 2018-06-01 NOTE — PROGRESS NOTES
PFT 5/17/18- final interpretation noted. Ordered for 6 month amiodarone f/u. Sees PRIYANKA Asael Gresham in September.  Will message Dr. Galo to review

## 2018-06-07 NOTE — PROGRESS NOTES
Spoke with patient about results of PFT. Reviewed patient's upcoming appointments with him. Patient verbalized understanding and agreed with plan of care.

## 2018-06-27 ENCOUNTER — ALLIED HEALTH/NURSE VISIT (OUTPATIENT)
Dept: CARDIOLOGY | Facility: CLINIC | Age: 83
End: 2018-06-27
Payer: MEDICARE

## 2018-06-27 DIAGNOSIS — I49.5 SICK SINUS SYNDROME (H): ICD-10-CM

## 2018-06-27 DIAGNOSIS — Z95.0 CARDIAC PACEMAKER IN SITU: Primary | ICD-10-CM

## 2018-06-27 PROCEDURE — 93280 PM DEVICE PROGR EVAL DUAL: CPT | Performed by: INTERNAL MEDICINE

## 2018-06-27 NOTE — MR AVS SNAPSHOT
After Visit Summary   6/27/2018    Balta Drake    MRN: [id number]           Patient Information     Date Of Birth          10/23/1927        Visit Information        Provider Department      6/27/2018 11:15 AM [facility]USTER T[facility]        Today's Diagnoses     Cardiac pacemaker in situ    -  1    Sick sinus syndrome (H)           Follow-ups after your visit        Your next 10 appointments already scheduled     Sep 18, 2018 10:00 AM CDT   LAB with [facility]INS LAB   [facility] ([facility])    [address] [place] [id number]   [phone]           Please do not eat 10-12 hours before your appointment if you are coming in fasting for labs on lipids, cholesterol, or glucose (sugar). This does not apply to pregnant women. Water, hot tea and black coffee (with nothing added) are okay. Do not drink other fluids, diet soda or chew gum.            Sep 18, 2018 11:00 AM CDT   Return Visit with Asael Gresham PA-C   [facility] ([facility])    [address] [place] [id number] [phone] OPT 2            Sep 27, 2018 10:45 AM CDT   Phone Device Check with GOINS TECH2   [facility] ([facility])    [address] [place] [id number] [phone] OPT 2              Who to contact     If you have questions or need follow up information about today's clinic visit or your schedule please contact [facility] directly at [phone].  Normal or non-critical lab and imaging results will be communicated to you by MyChart, letter or phone within 4 business days after the clinic has received the results. If you do not hear from us within 7 days, please contact the clinic through MyChart or phone. If you have a critical or abnormal lab result,  we will notify you by phone as soon as possible.  Submit refill requests through Clerk or call your pharmacy and they will forward the refill request to us. Please allow 3 business days for your refill to be completed.          Additional Information About Your Visit        Care EveryWhere ID     This is your Care EveryWhere ID. This could be used by other organizations to access your [facility] medical records  [id number]         Blood Pressure from Last 3 Encounters:   05/22/18 102/54   10/03/17 113/70   06/14/17 102/66    Weight from Last 3 Encounters:   05/22/18 88.5 kg (195 lb)   10/03/17 90.1 kg (198 lb 9.6 oz)   06/14/17 90.3 kg (199 lb)              We Performed the Following     PM DEVICE PROGRAMMING EVAL, DUAL LEAD PACER ([id number])        Primary Care Provider Office Phone # Fax #    Edmundo Roblero [phone]       [place] [address]        Equal Access to Services     ÁNGEL [facility] : Hadii aad ku hadasho Soomaali, waaxda luqadaha, qaybta kaalmada adeegyada, waxay idiin hayaan zo khaneta chang . So [facility] [phone].    ATENCIÓN: Si habla español, tiene a mckeon disposición servicios gratuitos de asistencia lingüística. [facility] [phone].    We comply with applicable federal civil rights laws and [place] laws. We do not discriminate on the basis of race, color, national origin, age, disability, sex, sexual orientation, or gender identity.            Thank you!     Thank you for choosing [facility]  for your care. Our goal is always to provide you with excellent care. Hearing back from our patients is one way we can continue to improve our services. Please take a few minutes to complete the written survey that you may receive in the mail after your visit with us. Thank you!             Your Updated Medication List - Protect others around you: Learn how to safely use, store and throw away your  medicines at www.disposemymeds.org.          This list is accurate as of 6/27/18 11:20 AM.  Always use your most recent med list.                   Brand Name Dispense Instructions for use Diagnosis    amiodarone 200 MG tablet    PACERONE/CODARONE    45 tablet    Take 0.5 tablets (100 mg) by mouth daily 1/2 daily    Chronic atrial fibrillation (H)       apixaban ANTICOAGULANT 5 MG tablet    ELIQUIS    180 tablet    Take 1 tablet (5 mg) by mouth 2 times daily    Chronic atrial fibrillation (H)       celecoxib 200 MG capsule    celeBREX    90 capsule    Take 1 capsule by mouth daily.    Lumbar spinal stenosis       ezetimibe-simvastatin 10-40 MG per tablet    VYTORIN    90 tablet    Take 1 tablet by mouth At Bedtime    Mixed hyperlipidemia       FLOMAX 0.4 MG capsule   Generic drug:  tamsulosin      Take 0.4 mg by mouth daily        metoprolol succinate 50 MG 24 hr tablet    TOPROL-XL    90 tablet    Take 1 tablet (50 mg) by mouth daily    ASCVD (arteriosclerotic cardiovascular disease)       senna-docusate 8.6-50 MG per tablet    SENOKOT-S;PERICOLACE    60 tablet    Take 1-2 tablets by mouth 2 times daily Continue while taking narcotic pain medications. Hold for loose stools.    Primary localized osteoarthrosis, pelvic region and thigh       TYLENOL PO      Take by mouth every 4 hours as needed

## 2018-06-27 NOTE — PROGRESS NOTES
[employer]ru[employer] (D) Pacemaker Device Check    AP: 100 % : 27 %  Mode: DDDR         Underlying Rhythm: SB with a 1st degree, rates in the 40s-50s  Heart Rate: adequate variability, ambulates slowly with a cane   Sensing: WNL    Pacing Threshold: WNL   Impedance: WNL  Battery Status: estimated <0.5 years longevity remaining   Device Site: remains well healed  Atrial Arrhythmia: 31 mode switches, longest duration 36 hours 28 minutes, all the rest less than 1 minute. Remains on eliquis  Ventricular Arrhythmia: none  Setting Change: no changes made today     Care Plan: next phone check on 9/27, seeing cardiology in September.

## 2018-07-10 ENCOUNTER — TELEPHONE (OUTPATIENT)
Dept: CARDIOLOGY | Facility: CLINIC | Age: 83
End: 2018-07-10

## 2018-07-10 DIAGNOSIS — I48.20 CHRONIC ATRIAL FIBRILLATION (H): ICD-10-CM

## 2018-07-10 NOTE — TELEPHONE ENCOUNTER
"VM from patient stating that he has a \"prescription problem.\" Attempted to call patient back, patient did not answer. Left message for patient to call back.   "

## 2018-07-11 NOTE — TELEPHONE ENCOUNTER
[facility] called stating [facility] will be shipping his Eliquis 5 mg one tablet twice daily. Patient states he will be out of medication soon and requests samples. 4 boxes of Eliquis 5 mg one tablet twice daily available for .

## 2018-07-18 LAB
ANION GAP SERPL CALCULATED.3IONS-SCNC: 6 MMOL/L
BUN SERPL-MCNC: 22 MG/DL
CALCIUM SERPL-MCNC: 9.2 MG/DL
CHLORIDE SERPLBLD-SCNC: 106 MMOL/L
CHOLEST SERPL-MCNC: 132 MG/DL
CO2 SERPL-SCNC: 25 MMOL/L
CREAT SERPL-MCNC: 1.24 MG/DL
ERYTHROCYTE [DISTWIDTH] IN BLOOD BY AUTOMATED COUNT: 13.2 %
GFR SERPL CREATININE-BSD FRML MDRD: 55 ML/MIN/1.73M2
GLUCOSE SERPL-MCNC: 97 MG/DL (ref 65–100)
HCT VFR BLD AUTO: 41.7 %
HDLC SERPL-MCNC: 57 MG/DL
HEMOGLOBIN: 14.4 G/DL (ref 13.5–17.5)
LDLC SERPL CALC-MCNC: 59 MG/DL
MCH RBC QN AUTO: 35.6 PG
MCHC RBC AUTO-ENTMCNC: 34.5 G/DL
MCV RBC AUTO: 103 FL
NONHDLC SERPL-MCNC: NORMAL MG/DL
PLATELET # BLD AUTO: 172 10^9/L
POTASSIUM SERPL-SCNC: 4.7 MMOL/L
RBC # BLD AUTO: 4.05 10^12/L
SODIUM SERPL-SCNC: 137 MMOL/L
TRIGL SERPL-MCNC: 78 MG/DL
WBC # BLD AUTO: 8 10^9/L

## 2018-07-30 ENCOUNTER — TELEPHONE (OUTPATIENT)
Dept: CARDIOLOGY | Facility: CLINIC | Age: 83
End: 2018-07-30

## 2018-07-30 DIAGNOSIS — I48.20 CHRONIC ATRIAL FIBRILLATION (H): ICD-10-CM

## 2018-07-30 DIAGNOSIS — I25.10 ASCVD (ARTERIOSCLEROTIC CARDIOVASCULAR DISEASE): ICD-10-CM

## 2018-07-30 DIAGNOSIS — E78.2 MIXED HYPERLIPIDEMIA: ICD-10-CM

## 2018-07-30 RX ORDER — AMIODARONE HYDROCHLORIDE 100 MG/1
100 TABLET ORAL DAILY
Qty: 90 TABLET | Refills: 0 | Status: SHIPPED | OUTPATIENT
Start: 2018-07-30 | End: 2018-07-30

## 2018-07-30 RX ORDER — AMIODARONE HYDROCHLORIDE 100 MG/1
100 TABLET ORAL DAILY
Qty: 90 TABLET | Refills: 0 | Status: SHIPPED | OUTPATIENT
Start: 2018-07-30 | End: 2018-09-18

## 2018-07-30 RX ORDER — METOPROLOL SUCCINATE 50 MG/1
50 TABLET, EXTENDED RELEASE ORAL DAILY
Qty: 90 TABLET | Refills: 0 | Status: SHIPPED | OUTPATIENT
Start: 2018-07-30 | End: 2018-09-18

## 2018-07-30 RX ORDER — EZETIMIBE AND SIMVASTATIN 10; 40 MG/1; MG/1
1 TABLET ORAL AT BEDTIME
Qty: 90 TABLET | Refills: 0 | Status: SHIPPED | OUTPATIENT
Start: 2018-07-30 | End: 2018-09-18

## 2018-07-30 RX ORDER — AMIODARONE HYDROCHLORIDE 100 MG/1
TABLET ORAL
Qty: 90 TABLET | Refills: 0 | Status: SHIPPED | OUTPATIENT
Start: 2018-07-30 | End: 2018-07-30

## 2018-07-30 NOTE — TELEPHONE ENCOUNTER
Patient called requesting 4 medication refills. Metoprolol succinate, Vytorin, Eliquis and amiodarone.  Patient is taking 100 mg on amiodarone daily, will refill in 100 mg tablet size. Patient verbalized understanding and agrees with tablet size change  [facility] at fax # [phone].

## 2018-09-06 ENCOUNTER — PRE VISIT (OUTPATIENT)
Dept: CARDIOLOGY | Facility: CLINIC | Age: 83
End: 2018-09-06

## 2018-09-06 NOTE — TELEPHONE ENCOUNTER
[facility] lab work for July added to chart. Attempted to contact patient to cancel bmp planned with OV 9/18/18. Left message for patient to call back.

## 2018-09-18 ENCOUNTER — OFFICE VISIT (OUTPATIENT)
Dept: CARDIOLOGY | Facility: CLINIC | Age: 83
End: 2018-09-18
Attending: INTERNAL MEDICINE
Payer: MEDICARE

## 2018-09-18 VITALS
HEIGHT: 66 IN | HEART RATE: 74 BPM | WEIGHT: 185.2 LBS | BODY MASS INDEX: 29.77 KG/M2 | SYSTOLIC BLOOD PRESSURE: 101 MMHG | DIASTOLIC BLOOD PRESSURE: 58 MMHG

## 2018-09-18 DIAGNOSIS — I48.0 PAROXYSMAL ATRIAL FIBRILLATION (H): ICD-10-CM

## 2018-09-18 DIAGNOSIS — I25.10 CORONARY ARTERY DISEASE INVOLVING NATIVE CORONARY ARTERY OF NATIVE HEART WITHOUT ANGINA PECTORIS: Primary | ICD-10-CM

## 2018-09-18 DIAGNOSIS — I10 ESSENTIAL HYPERTENSION WITH GOAL BLOOD PRESSURE LESS THAN 140/90: ICD-10-CM

## 2018-09-18 DIAGNOSIS — I49.5 SICK SINUS SYNDROME (H): ICD-10-CM

## 2018-09-18 DIAGNOSIS — E78.2 MIXED HYPERLIPIDEMIA: ICD-10-CM

## 2018-09-18 DIAGNOSIS — Z95.0 CARDIAC PACEMAKER IN SITU: ICD-10-CM

## 2018-09-18 DIAGNOSIS — I48.91 ATRIAL FIBRILLATION, UNSPECIFIED TYPE (H): ICD-10-CM

## 2018-09-18 PROCEDURE — 99214 OFFICE O/P EST MOD 30 MIN: CPT | Performed by: PHYSICIAN ASSISTANT

## 2018-09-18 RX ORDER — EZETIMIBE AND SIMVASTATIN 10; 40 MG/1; MG/1
1 TABLET ORAL AT BEDTIME
Qty: 90 TABLET | Refills: 3 | Status: SHIPPED | OUTPATIENT
Start: 2018-09-18 | End: 2019-09-06

## 2018-09-18 RX ORDER — AMIODARONE HYDROCHLORIDE 100 MG/1
100 TABLET ORAL DAILY
Qty: 90 TABLET | Refills: 3 | Status: SHIPPED | OUTPATIENT
Start: 2018-09-18 | End: 2019-09-06

## 2018-09-18 RX ORDER — METOPROLOL SUCCINATE 50 MG/1
50 TABLET, EXTENDED RELEASE ORAL DAILY
Qty: 90 TABLET | Refills: 3 | Status: SHIPPED | OUTPATIENT
Start: 2018-09-18 | End: 2019-09-06

## 2018-09-18 NOTE — PATIENT INSTRUCTIONS
Today's Plan:   1) Continue with current medications.   2) Your prescriptions are renewed with 3 refills.     If you have questions or concerns please call my nurse team at [phone].     Scheduling phone number: [phone]  Reminder: Please bring in all current medications, over the counter supplements and vitamin bottles to your next appointment.    It was a pleasure seeing you today!     Asael Gresham  9/18/2018

## 2018-09-18 NOTE — LETTER
9/18/2018    Edmundo Roblero MD  [address] [address]    RE: Balta Drake       Dear Colleague,    I had the pleasure of seeing Balta Drake in the [facility].    Primary Cardiologist: Formerly Dr. Galo. He will establish care with Dr. Echavarria    History of Present Illness:   This is a very pleasant 89-year-old male who presents to cardiology clinic for four-month follow-up.  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  He returns to clinic stating that overall he is doing well.    He continues to live alone and does most of his chores by himself.  He has 6 children who live nearby who seem to help him when he needs to get them projects around the house.  He is a retired  and spends most of his days reading articles and spending time on his computer.  Last week he went skydiving with his family which was something he wanted to do at age 89.  He hopes to have another skydiving experience when he turns 89.  He continues to deny any symptoms of chest discomfort, shortness of breath, palpitations, lightheadedness, PND, orthopnea, or peripheral edema.  His predominant symptom has been low back pain which he has had for a long time due to his spinal stenosis.  He is able to ambulate okay with his cane.  He eats mostly frozen meals for lunch but goes over to his children's house for home cooked dinner on most nights.    Assessment and plan:  This is a very pleasant 89-year-old male who presents to cardiology clinic for four-month follow-up.  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    He appears to be doing quite well from a cardiac standpoint without any symptoms.  His vital signs are stable.  We will continue with his current medications.  He denies any bleeding issues with Eliquis.  I do note that he is not on baby aspirin which she has not been on for many years.  I recommended that he discusses this with Dr. Echavarria when he establishes care with him in 3 months.  If he starts aspirin he would benefit from moderate dose of PPI as he takes celecoxib regularly for his advanced osteoarthritis and spinal stenosis.    Thank you for allowing me to participate in the care of this pleasant patient today.      This note was completed in part using Dragon voice recognition software. Although reviewed after completion, some word and grammatical errors may occur.    Orders this Visit:  No orders of the defined types were placed in this encounter.    No orders of the defined types were placed in this encounter.    There are no discontinued medications.      Encounter Diagnoses   Name Primary?     ASCVD (arteriosclerotic cardiovascular disease)      Essential hypertension with goal blood pressure less than 140/90      Atrial fibrillation, unspecified type (H)      Cardiac pacemaker in situ      Paroxysmal atrial fibrillation (H)        CURRENT MEDICATIONS:  Current Outpatient Prescriptions   Medication Sig Dispense Refill     Acetaminophen (TYLENOL PO) Take by mouth every 4 hours as needed        amiodarone (PACERONE/CODARONE) 100 MG TABS tablet Take 1 tablet (100 mg) by mouth daily 90 tablet 0     apixaban ANTICOAGULANT (ELIQUIS) 5 MG tablet Take 1 tablet (5 mg) by mouth 2 times daily 180 tablet 0     celecoxib (CELEBREX) 200 MG capsule Take 1 capsule by mouth daily. 90 capsule 3     ezetimibe-simvastatin (VYTORIN)  10-40 MG per tablet Take 1 tablet by mouth At Bedtime 90 tablet 0     metoprolol succinate (TOPROL-XL) 50 MG 24 hr tablet Take 1 tablet (50 mg) by mouth daily 90 tablet 0     senna-docusate (SENOKOT-S;PERICOLACE) 8.6-50 MG per tablet Take 1-2 tablets by mouth 2 times daily Continue while taking narcotic pain medications. Hold for loose stools. 60 tablet 1     tamsulosin (FLOMAX) 0.4 MG 24 hr capsule Take 0.4 mg by mouth daily         ALLERGIES   No Known Allergies    PAST MEDICAL HISTORY:  Past Medical History:   Diagnosis Date     A-fib (H) 2003 and 2005    cardioverted 2005     Abdominal pain 2007 and 2009    ct neg, egd nl both 2009     Anal fissure      Aortic root dilatation (H)      Ascending aorta dilatation (H)      ASCVD (arteriosclerotic cardiovascular disease) 2003    ptca, angio 6/13/14-medical mgmt     Cardiomyopathy (H)      Colonic polyp     fu colonoscopy 2009 one polyp     Diverticulitis 2005     Hyperlipidaemia      Hypertension      Myocardial infarction     S/P Unspecified     Noninfectious ileitis     diverticulitis     NSVT (nonsustained ventricular tachycardia) (H)      PMR (polymyalgia rheumatica) (H) 2001     Sick sinus syndrome (H) 2003    dual chamber pacemaker implanted 2003, generator chagne 2009     Spinal stenosis      Unspecified essential hypertension        PAST SURGICAL HISTORY:  Past Surgical History:   Procedure Laterality Date     APPENDECTOMY       APPENDECTOMY       ARTHROPLASTY HIP Right 7/7/2015    Procedure: ARTHROPLASTY HIP;  Surgeon: Marah Durham MD;  Location:  OR     ARTHROSCOPY KNEE  2003     C TOTAL HIP ARTHROPLASTY  2007    left     COLONOSCOPY      polyp     ENT SURGERY      tonsillectomy     GRAFT BONE FROM ILIAC CREST  7/9/2014    Procedure: GRAFT BONE FROM ILIAC CREST;  Surgeon: Jose Francisco Anderson MD;  Location:  OR     [facility] stent       HEART CATH CORONARY ANGIOGRAM W/LV GRAM  6/2014    chronic occlusion ramus, significant narrowing at origin of 2nd  marginal branch     HERNIA REPAIR       HERNIA REPAIR       IMPLANT PACEMAKER  3/03    6/09 generator replacement     LAMINECTOMY, FUSION LUMBAR ONE LEVEL, COMBINED  7/9/2014    Procedure: COMBINED LAMINECTOMY, FUSION LUMBAR ONE LEVEL;  Surgeon: Jose Francisco Anderson MD;  Location: [facility] OR     lumbar disc surgery  2006     lumbar disc surgery         FAMILY HISTORY:  Family History   Problem Relation Age of Onset     Cardiovascular Father      Chronic Obstructive Pulmonary Disease Father      HEART DISEASE Mother      Breast Cancer Mother      Lung Cancer Brother      Other - See Comments Brother      something heart related       SOCIAL HISTORY:  Social History     Social History     Marital status:      Spouse name: N/A     Number of children: 7     Years of education: N/A     Occupational History      Retired     Social History Main Topics     Smoking status: Former Smoker     Years: 20.00     Types: Cigarettes     Start date: 1960     Quit date: 12/21/1980     Smokeless tobacco: Never Used     Alcohol use Yes      Comment: 5 drinks week     Drug use: No     Sexual activity: Not Currently     Other Topics Concern     Caffeine Concern No     rare     Sleep Concern Yes     d/t using bathroom     Weight Concern No     Special Diet No     Exercise No     back pain      Seat Belt Yes     Social History Narrative       Review of Systems:  Skin:  Negative     Eyes:  Negative    ENT:  Positive for hearing loss  Respiratory:  Positive for dyspnea on exertion  Cardiovascular:  Negative;palpitations;chest pain;lightheadedness;dizziness;syncope or near-syncope;cyanosis;edema Positive for;exercise intolerance  Gastroenterology: Negative    Genitourinary:  Positive for prostate problem;nocturia  Musculoskeletal:  Positive for joint pain;back pain;nocturnal cramping  Neurologic:  Negative numbness or tingling of feet  Psychiatric:  Positive for sleep disturbances  Heme/Lymph/Imm:  Negative    Endocrine:  Negative   "    Physical Exam:  Vitals: /58 (BP Location: Right arm, Cuff Size: Adult Large)  Pulse 74  Ht 1.676 m (5' 6\")  Wt 84 kg (185 lb 3.2 oz)  BMI 29.89 kg/m2     GEN:  NAD  NECK: No JVD  C/V:  Regular rate and rhythm, no murmur, rub or gallop.  RESP: Clear to auscultation bilaterally without wheezing, rales, or rhonchi.  GI: Abdomen soft, nontender, nondistended.   EXTREM: No LE edema.   NEURO: Alert and oriented, cooperative. No obvious focal deficits.   PSYCH: Normal affect.  SKIN: Warm and dry.       Recent Lab Results:  LIPID RESULTS:  Lab Results   Component Value Date    CHOL 132 07/18/2018    HDL 57 07/18/2018    LDL 59 07/18/2018    TRIG 78 07/18/2018    CHOLHDLRATIO 2.6 09/25/2015       LIVER ENZYME RESULTS:  Lab Results   Component Value Date    AST 21 05/22/2018    ALT 29 05/22/2018       CBC RESULTS:  Lab Results   Component Value Date    WBC 8.0 07/18/2018    RBC 4.05 07/18/2018    HGB 14.4 07/18/2018    HCT 41.7 07/18/2018     07/18/2018    MCH 35.6 07/18/2018    MCHC 34.5 07/18/2018    RDW 13.2 07/18/2018     07/18/2018       BMP RESULTS:  Lab Results   Component Value Date     07/18/2018    POTASSIUM 4.7 07/18/2018    CHLORIDE 106 07/18/2018    CO2 25 07/18/2018    ANIONGAP 6 07/18/2018    GLC 97 07/18/2018    BUN 22 07/18/2018    CR 1.24 07/18/2018    GFRESTIMATED 55 07/18/2018    GFRESTBLACK >60 07/18/2018    MARAH 9.2 07/18/2018        A1C RESULTS:  No results found for: A1C    INR RESULTS:  Lab Results   Component Value Date    INR 1.6 (A) 08/02/2017    INR 2.1 (A) 07/12/2017    INR 2.02 (H) 07/10/2015    INR 1.64 (H) 07/09/2015           Asael Gresham PA-C   September 18, 2018       Thank you for allowing me to participate in the care of your patient.      Sincerely,     Asael Gresham PA-C     [facility]    cc:   Fran Galo MD  [address]  [place] [id number]        "

## 2018-09-18 NOTE — PROGRESS NOTES
Primary Cardiologist: Formerly Dr. Galo. He will establish care with Dr. Echavarria    History of Present Illness:   This is a very pleasant 89-year-old male who presents to cardiology clinic for four-month follow-up.  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  He returns to clinic stating that overall he is doing well.    He continues to live alone and does most of his chores by himself.  He has 6 children who live nearby who seem to help him when he needs to get them projects around the house.  He is a retired  and spends most of his days reading articles and spending time on his computer.  Last week he went skydiving with his family which was something he wanted to do at age 89.  He hopes to have another skydiving experience when he turns 89.  He continues to deny any symptoms of chest discomfort, shortness of breath, palpitations, lightheadedness, PND, orthopnea, or peripheral edema.  His predominant symptom has been low back pain which he has had for a long time due to his spinal stenosis.  He is able to ambulate okay with his cane.  He eats mostly frozen meals for lunch but goes over to his children's house for home cooked dinner on most nights.    Assessment and plan:  This is a very pleasant 89-year-old male who presents to cardiology clinic for four-month follow-up.  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    He appears to be doing quite well from a cardiac  standpoint without any symptoms.  His vital signs are stable.  We will continue with his current medications.  He denies any bleeding issues with Eliquis.  I do note that he is not on baby aspirin which she has not been on for many years.  I recommended that he discusses this with Dr. Echavarria when he establishes care with him in 3 months.  If he starts aspirin he would benefit from moderate dose of PPI as he takes celecoxib regularly for his advanced osteoarthritis and spinal stenosis.    Thank you for allowing me to participate in the care of this pleasant patient today.      This note was completed in part using Dragon voice recognition software. Although reviewed after completion, some word and grammatical errors may occur.    Orders this Visit:  No orders of the defined types were placed in this encounter.    No orders of the defined types were placed in this encounter.    There are no discontinued medications.      Encounter Diagnoses   Name Primary?     ASCVD (arteriosclerotic cardiovascular disease)      Essential hypertension with goal blood pressure less than 140/90      Atrial fibrillation, unspecified type (H)      Cardiac pacemaker in situ      Paroxysmal atrial fibrillation (H)        CURRENT MEDICATIONS:  Current Outpatient Prescriptions   Medication Sig Dispense Refill     Acetaminophen (TYLENOL PO) Take by mouth every 4 hours as needed        amiodarone (PACERONE/CODARONE) 100 MG TABS tablet Take 1 tablet (100 mg) by mouth daily 90 tablet 0     apixaban ANTICOAGULANT (ELIQUIS) 5 MG tablet Take 1 tablet (5 mg) by mouth 2 times daily 180 tablet 0     celecoxib (CELEBREX) 200 MG capsule Take 1 capsule by mouth daily. 90 capsule 3     ezetimibe-simvastatin (VYTORIN) 10-40 MG per tablet Take 1 tablet by mouth At Bedtime 90 tablet 0     metoprolol succinate (TOPROL-XL) 50 MG 24 hr tablet Take 1 tablet (50 mg) by mouth daily 90 tablet 0     senna-docusate (SENOKOT-S;PERICOLACE) 8.6-50 MG per tablet Take 1-2  tablets by mouth 2 times daily Continue while taking narcotic pain medications. Hold for loose stools. 60 tablet 1     tamsulosin (FLOMAX) 0.4 MG 24 hr capsule Take 0.4 mg by mouth daily         ALLERGIES   No Known Allergies    PAST MEDICAL HISTORY:  Past Medical History:   Diagnosis Date     A-fib (H) 2003 and 2005    cardioverted 2005     Abdominal pain 2007 and 2009    ct neg, egd nl both 2009     Anal fissure      Aortic root dilatation (H)      Ascending aorta dilatation (H)      ASCVD (arteriosclerotic cardiovascular disease) 2003    ptca, angio 6/13/14-medical mgmt     Cardiomyopathy (H)      Colonic polyp     fu colonoscopy 2009 one polyp     Diverticulitis 2005     Hyperlipidaemia      Hypertension      Myocardial infarction     S/P Unspecified     Noninfectious ileitis     diverticulitis     NSVT (nonsustained ventricular tachycardia) (H)      PMR (polymyalgia rheumatica) (H) 2001     Sick sinus syndrome (H) 2003    dual chamber pacemaker implanted 2003, generator chagne 2009     Spinal stenosis      Unspecified essential hypertension        PAST SURGICAL HISTORY:  Past Surgical History:   Procedure Laterality Date     APPENDECTOMY       APPENDECTOMY       ARTHROPLASTY HIP Right 7/7/2015    Procedure: ARTHROPLASTY HIP;  Surgeon: Marah Durham MD;  Location:  OR     ARTHROSCOPY KNEE  2003     C TOTAL HIP ARTHROPLASTY  2007    left     COLONOSCOPY      polyp     ENT SURGERY      tonsillectomy     GRAFT BONE FROM ILIAC CREST  7/9/2014    Procedure: GRAFT BONE FROM ILIAC CREST;  Surgeon: Jose Francisco Anderson MD;  Location:  OR     [facility] stent       HEART CATH CORONARY ANGIOGRAM W/LV GRAM  6/2014    chronic occlusion ramus, significant narrowing at origin of 2nd marginal branch     HERNIA REPAIR       HERNIA REPAIR       IMPLANT PACEMAKER  3/03    6/09 generator replacement     LAMINECTOMY, FUSION LUMBAR ONE LEVEL, COMBINED  7/9/2014    Procedure: COMBINED LAMINECTOMY, FUSION LUMBAR ONE LEVEL;  Surgeon:  "Jose Francisco Anderson MD;  Location: [facility] OR     lumbar disc surgery  2006     lumbar disc surgery         FAMILY HISTORY:  Family History   Problem Relation Age of Onset     Cardiovascular Father      Chronic Obstructive Pulmonary Disease Father      HEART DISEASE Mother      Breast Cancer Mother      Lung Cancer Brother      Other - See Comments Brother      something heart related       SOCIAL HISTORY:  Social History     Social History     Marital status:      Spouse name: N/A     Number of children: 7     Years of education: N/A     Occupational History      Retired     Social History Main Topics     Smoking status: Former Smoker     Years: 20.00     Types: Cigarettes     Start date: 1960     Quit date: 12/21/1980     Smokeless tobacco: Never Used     Alcohol use Yes      Comment: 5 drinks week     Drug use: No     Sexual activity: Not Currently     Other Topics Concern     Caffeine Concern No     rare     Sleep Concern Yes     d/t using bathroom     Weight Concern No     Special Diet No     Exercise No     back pain      Seat Belt Yes     Social History Narrative       Review of Systems:  Skin:  Negative     Eyes:  Negative    ENT:  Positive for hearing loss  Respiratory:  Positive for dyspnea on exertion  Cardiovascular:  Negative;palpitations;chest pain;lightheadedness;dizziness;syncope or near-syncope;cyanosis;edema Positive for;exercise intolerance  Gastroenterology: Negative    Genitourinary:  Positive for prostate problem;nocturia  Musculoskeletal:  Positive for joint pain;back pain;nocturnal cramping  Neurologic:  Negative numbness or tingling of feet  Psychiatric:  Positive for sleep disturbances  Heme/Lymph/Imm:  Negative    Endocrine:  Negative      Physical Exam:  Vitals: /58 (BP Location: Right arm, Cuff Size: Adult Large)  Pulse 74  Ht 1.676 m (5' 6\")  Wt 84 kg (185 lb 3.2 oz)  BMI 29.89 kg/m2     GEN:  NAD  NECK: No JVD  C/V:  Regular rate and rhythm, no murmur, rub or gallop.  RESP: " Clear to auscultation bilaterally without wheezing, rales, or rhonchi.  GI: Abdomen soft, nontender, nondistended.   EXTREM: No LE edema.   NEURO: Alert and oriented, cooperative. No obvious focal deficits.   PSYCH: Normal affect.  SKIN: Warm and dry.       Recent Lab Results:  LIPID RESULTS:  Lab Results   Component Value Date    CHOL 132 07/18/2018    HDL 57 07/18/2018    LDL 59 07/18/2018    TRIG 78 07/18/2018    CHOLHDLRATIO 2.6 09/25/2015       LIVER ENZYME RESULTS:  Lab Results   Component Value Date    AST 21 05/22/2018    ALT 29 05/22/2018       CBC RESULTS:  Lab Results   Component Value Date    WBC 8.0 07/18/2018    RBC 4.05 07/18/2018    HGB 14.4 07/18/2018    HCT 41.7 07/18/2018     07/18/2018    MCH 35.6 07/18/2018    MCHC 34.5 07/18/2018    RDW 13.2 07/18/2018     07/18/2018       BMP RESULTS:  Lab Results   Component Value Date     07/18/2018    POTASSIUM 4.7 07/18/2018    CHLORIDE 106 07/18/2018    CO2 25 07/18/2018    ANIONGAP 6 07/18/2018    GLC 97 07/18/2018    BUN 22 07/18/2018    CR 1.24 07/18/2018    GFRESTIMATED 55 07/18/2018    GFRESTBLACK >60 07/18/2018    MARAH 9.2 07/18/2018        A1C RESULTS:  No results found for: A1C    INR RESULTS:  Lab Results   Component Value Date    INR 1.6 (A) 08/02/2017    INR 2.1 (A) 07/12/2017    INR 2.02 (H) 07/10/2015    INR 1.64 (H) 07/09/2015           Asael Gresham PA-C   September 18, 2018

## 2018-09-18 NOTE — MR AVS SNAPSHOT
After Visit Summary   9/18/2018    Balta Drake    MRN: [id number]           Patient Information     Date Of Birth          10/23/1927        Visit Information        Provider Department      9/18/2018 11:00 AM Asael Gresham PA-C [facility]        Today's Diagnoses     Coronary artery disease involving native coronary artery of native heart without angina pectoris    -  1    Essential hypertension with goal blood pressure less than 140/90        Atrial fibrillation, unspecified type (H)        Cardiac pacemaker in situ        Paroxysmal atrial fibrillation (H)        Mixed hyperlipidemia        Sick sinus syndrome (H)          Care Instructions    Today's Plan:   1) Continue with current medications.   2) Your prescriptions are renewed with 3 refills.     If you have questions or concerns please call my nurse team at [phone].     Scheduling phone number: [phone]  Reminder: Please bring in all current medications, over the counter supplements and vitamin bottles to your next appointment.    It was a pleasure seeing you today!     Asael Gresham  9/18/2018              Follow-ups after your visit        Your next 10 appointments already scheduled     Sep 27, 2018 10:45 AM CDT   Phone Device Check with GOINS TECH2   [facility] ([facility])    [address] [place] [id number] [phone] OPT 2              Who to contact     If you have questions or need follow up information about today's clinic visit or your schedule please contact [facility] directly at [phone].  Normal or non-critical lab and imaging results will be communicated to you by MyChart, letter or phone within 4 business days after the clinic has received the results. If you do not hear from us within 7 days, please contact the clinic through MyChart or phone. If  "you have a critical or abnormal lab result, we will notify you by phone as soon as possible.  Submit refill requests through [employer] or call your pharmacy and they will forward the refill request to us. Please allow 3 business days for your refill to be completed.          Additional Information About Your Visit        Care EveryWhere ID     This is your Care EveryWhere ID. This could be used by other organizations to access your [facility] medical records  [id number]        Your Vitals Were     Pulse Height BMI (Body Mass Index)             74 1.676 m (5' 6\") 29.89 kg/m2          Blood Pressure from Last 3 Encounters:   09/18/18 101/58   05/22/18 102/54   10/03/17 113/70    Weight from Last 3 Encounters:   09/18/18 84 kg (185 lb 3.2 oz)   05/22/18 88.5 kg (195 lb)   10/03/17 90.1 kg (198 lb 9.6 oz)              We Performed the Following     Follow-Up with Cardiac Advanced Practice Provider          Where to get your medicines      These medications were sent to [facility]  [address] [address]     Phone:  [phone]     amiodarone 100 MG Tabs tablet    apixaban ANTICOAGULANT 5 MG tablet    ezetimibe-simvastatin 10-40 MG per tablet    metoprolol succinate 50 MG 24 hr tablet          Primary Care Provider Office Phone # Fax #    Edmundo Roblero [phone]       [place] [address]        Equal Access to Services     [facility]: Hadii aad ku hadasho Soomaali, waaxda luqadaha, qaybta kaalmada adeegyada, tiffani aviles. So [facility] [phone].    ATENCIÓN: Si adisla arnie, tiene a mckeon disposición servicios gratuitos de asistencia lingüística. Nadia al [phone].    We comply with applicable federal civil rights laws and [place] laws. We do not discriminate on the basis of race, color, national origin, age, disability, sex, sexual " orientation, or gender identity.            Thank you!     Thank you for choosing [facility]  for your care. Our goal is always to provide you with excellent care. Hearing back from our patients is one way we can continue to improve our services. Please take a few minutes to complete the written survey that you may receive in the mail after your visit with us. Thank you!             Your Updated Medication List - Protect others around you: Learn how to safely use, store and throw away your medicines at www.disposemymeds.org.          This list is accurate as of 9/18/18 11:25 AM.  Always use your most recent med list.                   Brand Name Dispense Instructions for use Diagnosis    amiodarone 100 MG Tabs tablet    PACERONE/CODARONE    90 tablet    Take 1 tablet (100 mg) by mouth daily        apixaban ANTICOAGULANT 5 MG tablet    ELIQUIS    180 tablet    Take 1 tablet (5 mg) by mouth 2 times daily        celecoxib 200 MG capsule    celeBREX    90 capsule    Take 1 capsule by mouth daily.    Lumbar spinal stenosis       ezetimibe-simvastatin 10-40 MG per tablet    VYTORIN    90 tablet    Take 1 tablet by mouth At Bedtime    Mixed hyperlipidemia       FLOMAX 0.4 MG capsule   Generic drug:  tamsulosin      Take 0.4 mg by mouth daily        metoprolol succinate 50 MG 24 hr tablet    TOPROL-XL    90 tablet    Take 1 tablet (50 mg) by mouth daily        senna-docusate 8.6-50 MG per tablet    SENOKOT-S;PERICOLACE    60 tablet    Take 1-2 tablets by mouth 2 times daily Continue while taking narcotic pain medications. Hold for loose stools.    Primary localized osteoarthrosis, pelvic region and thigh       TYLENOL PO      Take by mouth every 4 hours as needed

## 2018-09-18 NOTE — LETTER
9/18/2018    Edmundo Roblero MD  [address] [address]    RE: Balta Drake       Dear Colleague,    I had the pleasure of seeing Balta Drake in the [facility].    Primary Cardiologist: Formerly Dr. Galo. He will establish care with Dr. Echavarria    History of Present Illness:   This is a very pleasant 89-year-old male who presents to cardiology clinic for four-month follow-up.  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  He returns to clinic stating that overall he is doing well.    He continues to live alone and does most of his chores by himself.  He has 6 children who live nearby who seem to help him when he needs to get them projects around the house.  He is a retired  and spends most of his days reading articles and spending time on his computer.  Last week he went skydiving with his family which was something he wanted to do at age 89.  He hopes to have another skydiving experience when he turns 89.  He continues to deny any symptoms of chest discomfort, shortness of breath, palpitations, lightheadedness, PND, orthopnea, or peripheral edema.  His predominant symptom has been low back pain which he has had for a long time due to his spinal stenosis.  He is able to ambulate okay with his cane.  He eats mostly frozen meals for lunch but goes over to his children's house for home cooked dinner on most nights.    Assessment and plan:  This is a very pleasant 89-year-old male who presents to cardiology clinic for four-month follow-up.  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    He appears to be doing quite well from a cardiac standpoint without any symptoms.  His vital signs are stable.  We will continue with his current medications.  He denies any bleeding issues with Eliquis.  I do note that he is not on baby aspirin which she has not been on for many years.  I recommended that he discusses this with Dr. Echavarria when he establishes care with him in 3 months.  If he starts aspirin he would benefit from moderate dose of PPI as he takes celecoxib regularly for his advanced osteoarthritis and spinal stenosis.    Thank you for allowing me to participate in the care of this pleasant patient today.      This note was completed in part using Dragon voice recognition software. Although reviewed after completion, some word and grammatical errors may occur.    Orders this Visit:  No orders of the defined types were placed in this encounter.    No orders of the defined types were placed in this encounter.    There are no discontinued medications.      Encounter Diagnoses   Name Primary?     ASCVD (arteriosclerotic cardiovascular disease)      Essential hypertension with goal blood pressure less than 140/90      Atrial fibrillation, unspecified type (H)      Cardiac pacemaker in situ      Paroxysmal atrial fibrillation (H)        CURRENT MEDICATIONS:  Current Outpatient Prescriptions   Medication Sig Dispense Refill     Acetaminophen (TYLENOL PO) Take by mouth every 4 hours as needed        amiodarone (PACERONE/CODARONE) 100 MG TABS tablet Take 1 tablet (100 mg) by mouth daily 90 tablet 0     apixaban ANTICOAGULANT (ELIQUIS) 5 MG tablet Take 1 tablet (5 mg) by mouth 2 times daily 180 tablet 0     celecoxib (CELEBREX) 200 MG capsule Take 1 capsule by mouth daily. 90 capsule 3     ezetimibe-simvastatin (VYTORIN)  10-40 MG per tablet Take 1 tablet by mouth At Bedtime 90 tablet 0     metoprolol succinate (TOPROL-XL) 50 MG 24 hr tablet Take 1 tablet (50 mg) by mouth daily 90 tablet 0     senna-docusate (SENOKOT-S;PERICOLACE) 8.6-50 MG per tablet Take 1-2 tablets by mouth 2 times daily Continue while taking narcotic pain medications. Hold for loose stools. 60 tablet 1     tamsulosin (FLOMAX) 0.4 MG 24 hr capsule Take 0.4 mg by mouth daily         ALLERGIES   No Known Allergies    PAST MEDICAL HISTORY:  Past Medical History:   Diagnosis Date     A-fib (H) 2003 and 2005    cardioverted 2005     Abdominal pain 2007 and 2009    ct neg, egd nl both 2009     Anal fissure      Aortic root dilatation (H)      Ascending aorta dilatation (H)      ASCVD (arteriosclerotic cardiovascular disease) 2003    ptca, angio 6/13/14-medical mgmt     Cardiomyopathy (H)      Colonic polyp     fu colonoscopy 2009 one polyp     Diverticulitis 2005     Hyperlipidaemia      Hypertension      Myocardial infarction     S/P Unspecified     Noninfectious ileitis     diverticulitis     NSVT (nonsustained ventricular tachycardia) (H)      PMR (polymyalgia rheumatica) (H) 2001     Sick sinus syndrome (H) 2003    dual chamber pacemaker implanted 2003, generator chagne 2009     Spinal stenosis      Unspecified essential hypertension        PAST SURGICAL HISTORY:  Past Surgical History:   Procedure Laterality Date     APPENDECTOMY       APPENDECTOMY       ARTHROPLASTY HIP Right 7/7/2015    Procedure: ARTHROPLASTY HIP;  Surgeon: Marah Durham MD;  Location:  OR     ARTHROSCOPY KNEE  2003     C TOTAL HIP ARTHROPLASTY  2007    left     COLONOSCOPY      polyp     ENT SURGERY      tonsillectomy     GRAFT BONE FROM ILIAC CREST  7/9/2014    Procedure: GRAFT BONE FROM ILIAC CREST;  Surgeon: Jose Francisco Anderson MD;  Location:  OR     [facility] stent       HEART CATH CORONARY ANGIOGRAM W/LV GRAM  6/2014    chronic occlusion ramus, significant narrowing at origin of 2nd  marginal branch     HERNIA REPAIR       HERNIA REPAIR       IMPLANT PACEMAKER  3/03    6/09 generator replacement     LAMINECTOMY, FUSION LUMBAR ONE LEVEL, COMBINED  7/9/2014    Procedure: COMBINED LAMINECTOMY, FUSION LUMBAR ONE LEVEL;  Surgeon: Jose Francisco Anderson MD;  Location: [facility] OR     lumbar disc surgery  2006     lumbar disc surgery         FAMILY HISTORY:  Family History   Problem Relation Age of Onset     Cardiovascular Father      Chronic Obstructive Pulmonary Disease Father      HEART DISEASE Mother      Breast Cancer Mother      Lung Cancer Brother      Other - See Comments Brother      something heart related       SOCIAL HISTORY:  Social History     Social History     Marital status:      Spouse name: N/A     Number of children: 7     Years of education: N/A     Occupational History      Retired     Social History Main Topics     Smoking status: Former Smoker     Years: 20.00     Types: Cigarettes     Start date: 1960     Quit date: 12/21/1980     Smokeless tobacco: Never Used     Alcohol use Yes      Comment: 5 drinks week     Drug use: No     Sexual activity: Not Currently     Other Topics Concern     Caffeine Concern No     rare     Sleep Concern Yes     d/t using bathroom     Weight Concern No     Special Diet No     Exercise No     back pain      Seat Belt Yes     Social History Narrative       Review of Systems:  Skin:  Negative     Eyes:  Negative    ENT:  Positive for hearing loss  Respiratory:  Positive for dyspnea on exertion  Cardiovascular:  Negative;palpitations;chest pain;lightheadedness;dizziness;syncope or near-syncope;cyanosis;edema Positive for;exercise intolerance  Gastroenterology: Negative    Genitourinary:  Positive for prostate problem;nocturia  Musculoskeletal:  Positive for joint pain;back pain;nocturnal cramping  Neurologic:  Negative numbness or tingling of feet  Psychiatric:  Positive for sleep disturbances  Heme/Lymph/Imm:  Negative    Endocrine:  Negative   "    Physical Exam:  Vitals: /58 (BP Location: Right arm, Cuff Size: Adult Large)  Pulse 74  Ht 1.676 m (5' 6\")  Wt 84 kg (185 lb 3.2 oz)  BMI 29.89 kg/m2     GEN:  NAD  NECK: No JVD  C/V:  Regular rate and rhythm, no murmur, rub or gallop.  RESP: Clear to auscultation bilaterally without wheezing, rales, or rhonchi.  GI: Abdomen soft, nontender, nondistended.   EXTREM: No LE edema.   NEURO: Alert and oriented, cooperative. No obvious focal deficits.   PSYCH: Normal affect.  SKIN: Warm and dry.       Recent Lab Results:  LIPID RESULTS:  Lab Results   Component Value Date    CHOL 132 07/18/2018    HDL 57 07/18/2018    LDL 59 07/18/2018    TRIG 78 07/18/2018    CHOLHDLRATIO 2.6 09/25/2015       LIVER ENZYME RESULTS:  Lab Results   Component Value Date    AST 21 05/22/2018    ALT 29 05/22/2018       CBC RESULTS:  Lab Results   Component Value Date    WBC 8.0 07/18/2018    RBC 4.05 07/18/2018    HGB 14.4 07/18/2018    HCT 41.7 07/18/2018     07/18/2018    MCH 35.6 07/18/2018    MCHC 34.5 07/18/2018    RDW 13.2 07/18/2018     07/18/2018       BMP RESULTS:  Lab Results   Component Value Date     07/18/2018    POTASSIUM 4.7 07/18/2018    CHLORIDE 106 07/18/2018    CO2 25 07/18/2018    ANIONGAP 6 07/18/2018    GLC 97 07/18/2018    BUN 22 07/18/2018    CR 1.24 07/18/2018    GFRESTIMATED 55 07/18/2018    GFRESTBLACK >60 07/18/2018    MARAH 9.2 07/18/2018        A1C RESULTS:  No results found for: A1C    INR RESULTS:  Lab Results   Component Value Date    INR 1.6 (A) 08/02/2017    INR 2.1 (A) 07/12/2017    INR 2.02 (H) 07/10/2015    INR 1.64 (H) 07/09/2015           Asael Gresham PA-C   September 18, 2018       Thank you for allowing me to participate in the care of your patient.    Sincerely,     Asael Gresham PA-C     [facility]    "

## 2018-09-27 ENCOUNTER — ALLIED HEALTH/NURSE VISIT (OUTPATIENT)
Dept: CARDIOLOGY | Facility: CLINIC | Age: 83
End: 2018-09-27
Payer: MEDICARE

## 2018-09-27 DIAGNOSIS — Z95.0 CARDIAC PACEMAKER IN SITU: Primary | ICD-10-CM

## 2018-09-27 PROCEDURE — 93293 PM PHONE R-STRIP DEVICE EVAL: CPT | Performed by: INTERNAL MEDICINE

## 2018-09-27 NOTE — MR AVS SNAPSHOT
"              After Visit Summary   9/27/2018    Balta Drake    MRN: [id number]           Patient Information     Date Of Birth          10/23/1927        Visit Information        Provider Department      9/27/2018 10:45 AM BUSTER CORDERO [facility]        Today's Diagnoses     Cardiac pacemaker in situ    -  1       Follow-ups after your visit        Your next 10 appointments already scheduled     Nov 01, 2018 11:30 AM CDT   Phone Device Check with BUSTER CORDERO   [facility] ([facility])    [address] [place], [place] [id number]  [facility] [address] [phone] OPT 2              Who to contact     If you have questions or need follow up information about today's clinic visit or your schedule please contact [facility] directly at [phone].  Normal or non-critical lab and imaging results will be communicated to you by Pet Wirelesshart, letter or phone within 4 business days after the clinic has received the results. If you do not hear from us within 7 days, please contact the clinic through Pet [employer]hart or phone. If you have a critical or abnormal lab result, we will notify you by phone as soon as possible.  Submit refill requests through [employer] or call your pharmacy and they will forward the refill request to us. Please allow 3 business days for your refill to be completed.          Additional Information About Your Visit        MyChart Information     [employer] lets you send messages to your doctor, view your test results, renew your prescriptions, schedule appointments and more. To sign up, go to [email or web address] . Click on \"Log in\" on the left side of the screen, which will take you to the Welcome page. Then click on \"Sign up Now\" on the right side of the page.     You will be asked to enter the access code listed below, as well as some personal information. Please follow the directions to create your " username and password.     Your access code is: [email or web address]  Expires: 2018 10:56 AM     Your access code will  in 90 days. If you need help or a new code, please call your [facility] clinic or [phone].        Care EveryWhere ID     This is your Care EveryWhere ID. This could be used by other organizations to access your [facility] medical records  [id number]         Blood Pressure from Last 3 Encounters:   18 101/58   18 102/54   10/03/17 113/70    Weight from Last 3 Encounters:   18 84 kg (185 lb 3.2 oz)   18 88.5 kg (195 lb)   10/03/17 90.1 kg (198 lb 9.6 oz)              We Performed the Following     PM PHONE R STRIP EVAL UP TO 90 DAYS ([id number])        Primary Care Provider Office Phone # Fax #    Edmundo Roblero [phone]       [place] [address]        Equal Access to Services     [facility]: Hadii aad ku hadasho Soomaali, waaxda luqadaha, qaybta kaalmada adesudeep, tiffani chang . So [facility] [phone].    ATENCIÓN: Si habla español, tiene a mckeon disposición servicios gratuitos de asistencia lingüística. [facility] [phone].    We comply with applicable federal civil rights laws and [place] laws. We do not discriminate on the basis of race, color, national origin, age, disability, sex, sexual orientation, or gender identity.            Thank you!     Thank you for choosing [facility]  for your care. Our goal is always to provide you with excellent care. Hearing back from our patients is one way we can continue to improve our services. Please take a few minutes to complete the written survey that you may receive in the mail after your visit with us. Thank you!             Your Updated Medication List - Protect others around you: Learn how to safely use, store and throw away your medicines at www.disposemymeds.org.          This list is  accurate as of 9/27/18 10:56 AM.  Always use your most recent med list.                   Brand Name Dispense Instructions for use Diagnosis    amiodarone 100 MG Tabs tablet    PACERONE/CODARONE    90 tablet    Take 1 tablet (100 mg) by mouth daily        apixaban ANTICOAGULANT 5 MG tablet    ELIQUIS    180 tablet    Take 1 tablet (5 mg) by mouth 2 times daily        celecoxib 200 MG capsule    celeBREX    90 capsule    Take 1 capsule by mouth daily.    Lumbar spinal stenosis       ezetimibe-simvastatin 10-40 MG per tablet    VYTORIN    90 tablet    Take 1 tablet by mouth At Bedtime    Mixed hyperlipidemia       FLOMAX 0.4 MG capsule   Generic drug:  tamsulosin      Take 0.4 mg by mouth daily        metoprolol succinate 50 MG 24 hr tablet    TOPROL-XL    90 tablet    Take 1 tablet (50 mg) by mouth daily        senna-docusate 8.6-50 MG per tablet    SENOKOT-S;PERICOLACE    60 tablet    Take 1-2 tablets by mouth 2 times daily Continue while taking narcotic pain medications. Hold for loose stools.    Primary localized osteoarthrosis, pelvic region and thigh       TYLENOL PO      Take by mouth every 4 hours as needed

## 2018-09-27 NOTE — PROGRESS NOTES
PHONE TELETRACE, COURTESY    Appropriate pacemaker function at time of check. Magnet response reached 90 today.   F/U 1 mo

## 2018-11-07 DIAGNOSIS — I48.91 ATRIAL FIBRILLATION (H): Primary | ICD-10-CM

## 2018-11-08 ENCOUNTER — ALLIED HEALTH/NURSE VISIT (OUTPATIENT)
Dept: CARDIOLOGY | Facility: CLINIC | Age: 83
End: 2018-11-08
Payer: MEDICARE

## 2018-11-08 DIAGNOSIS — Z95.0 CARDIAC PACEMAKER IN SITU: Primary | ICD-10-CM

## 2018-11-08 PROCEDURE — 99207 ZZC NO CHARGE LOS: CPT | Performed by: INTERNAL MEDICINE

## 2018-11-08 NOTE — MR AVS SNAPSHOT
After Visit Summary   11/8/2018    Balta Drake    MRN: [id number]           Patient Information     Date Of Birth          10/23/1927        Visit Information        Provider Department      11/8/2018 1:00 PM BUSTER CORDERO [facility]        Today's Diagnoses     Cardiac pacemaker in situ    -  1       Follow-ups after your visit        Your next 10 appointments already scheduled     Dec 13, 2018  1:00 PM CST   30 Day Phone Check with BUSTER CORDERO   [facility] ([facility])    [address] [place] [address] [phone] OPT 2              Future tests that were ordered for you today     Open Future Orders        Priority Expected Expires Ordered    Hepatic panel Routine 11/18/2018 11/7/2019 11/7/2018    TSH with free T4 reflex Routine 11/18/2018 11/7/2019 11/7/2018    XR Chest 2 Views Routine 11/18/2018 11/7/2019 11/7/2018            Who to contact     If you have questions or need follow up information about today's clinic visit or your schedule please contact [facility] directly at [phone].  Normal or non-critical lab and imaging results will be communicated to you by Re-Composehart, letter or phone within 4 business days after the clinic has received the results. If you do not hear from us within 7 days, please contact the clinic through Re-Composehart or phone. If you have a critical or abnormal lab result, we will notify you by phone as soon as possible.  Submit refill requests through [employer] or call your pharmacy and they will forward the refill request to us. Please allow 3 business days for your refill to be completed.          Additional Information About Your Visit        MyChart Information     [employer] lets you send messages to your doctor, view your test results, renew your prescriptions, schedule appointments and more. To sign up, go to  [email or web address] . Click on \"Log in\" on the left side of the screen, which will take you to the Welcome page. Then click on \"Sign up Now\" on the right side of the page.     You will be asked to enter the access code listed below, as well as some personal information. Please follow the directions to create your username and password.     Your access code is: [email or web address]  Expires: 2018  9:56 AM     Your access code will  in 90 days. If you need help or a new code, please call your [facility] clinic or [phone].        Care EveryWhere ID     This is your Care EveryWhere ID. This could be used by other organizations to access your [facility] medical records  [id number]         Blood Pressure from Last 3 Encounters:   18 101/58   18 102/54   10/03/17 113/70    Weight from Last 3 Encounters:   18 84 kg (185 lb 3.2 oz)   18 88.5 kg (195 lb)   10/03/17 90.1 kg (198 lb 9.6 oz)              Today, you had the following     No orders found for display       Primary Care Provider Office Phone # Fax #    Edmundo Roblero [phone]       [place] [address]        Equal Access to Services     [facility]: Hadii franco jaquez hadasho Soomaali, waaxda luqadaha, qaybta kaalmada adeegyada, tiffani chang . So [facility] [phone].    ATENCIÓN: Si habla español, tiene a mckeon disposición servicios gratuitos de asistencia lingüística. Llame al [phone].    We comply with applicable federal civil rights laws and [place] laws. We do not discriminate on the basis of race, color, national origin, age, disability, sex, sexual orientation, or gender identity.            Thank you!     Thank you for choosing [facility]  for your care. Our goal is always to provide you with excellent care. Hearing back from our patients is one way we can continue to improve our services. " Please take a few minutes to complete the written survey that you may receive in the mail after your visit with us. Thank you!             Your Updated Medication List - Protect others around you: Learn how to safely use, store and throw away your medicines at www.disposemymeds.org.          This list is accurate as of 11/8/18  1:10 PM.  Always use your most recent med list.                   Brand Name Dispense Instructions for use Diagnosis    amiodarone 100 MG Tabs tablet    PACERONE/CODARONE    90 tablet    Take 1 tablet (100 mg) by mouth daily        apixaban ANTICOAGULANT 5 MG tablet    ELIQUIS    180 tablet    Take 1 tablet (5 mg) by mouth 2 times daily        celecoxib 200 MG capsule    celeBREX    90 capsule    Take 1 capsule by mouth daily.    Lumbar spinal stenosis       ezetimibe-simvastatin 10-40 MG per tablet    VYTORIN    90 tablet    Take 1 tablet by mouth At Bedtime    Mixed hyperlipidemia       FLOMAX 0.4 MG capsule   Generic drug:  tamsulosin      Take 0.4 mg by mouth daily        metoprolol succinate 50 MG 24 hr tablet    TOPROL-XL    90 tablet    Take 1 tablet (50 mg) by mouth daily        senna-docusate 8.6-50 MG per tablet    SENOKOT-S;PERICOLACE    60 tablet    Take 1-2 tablets by mouth 2 times daily Continue while taking narcotic pain medications. Hold for loose stools.    Primary localized osteoarthrosis, pelvic region and thigh       TYLENOL PO      Take by mouth every 4 hours as needed

## 2018-11-08 NOTE — PROGRESS NOTES
30 DAY PHONE TELETRACE, NO CHARGE  Appropriate pacemaker function at time of check. Magnet response at 90.   F/U 1 mo. E.Mj,CVT

## 2018-11-09 ENCOUNTER — TELEPHONE (OUTPATIENT)
Dept: CARDIOLOGY | Facility: CLINIC | Age: 83
End: 2018-11-09

## 2018-11-09 DIAGNOSIS — I48.0 PAROXYSMAL A-FIB (H): Primary | ICD-10-CM

## 2018-11-09 NOTE — TELEPHONE ENCOUNTER
Call received from scheduling, pt voiced concern about why he needs repeat lab work when he states he just had some done.     Left message for patient to call back.

## 2018-11-12 NOTE — TELEPHONE ENCOUNTER
Spoke with patient, clarified that the lab work that is ordered is for follow-up for his amiodarone and that he does in fact need it. CXR, labs, and OV w/ Dr Echavarria scheduled for 12/17/18. Pt verbalized understanding.

## 2018-12-13 ENCOUNTER — ANCILLARY PROCEDURE (OUTPATIENT)
Dept: CARDIOLOGY | Facility: CLINIC | Age: 83
End: 2018-12-13
Payer: MEDICARE

## 2018-12-13 DIAGNOSIS — I49.5 SSS (SICK SINUS SYNDROME) (H): ICD-10-CM

## 2018-12-13 PROCEDURE — 93293 PM PHONE R-STRIP DEVICE EVAL: CPT | Performed by: INTERNAL MEDICINE

## 2018-12-15 LAB
ICDO DEVICE COMMENTS: NORMAL
MDC_IDC_LEAD_IMPLANT_DT: NORMAL
MDC_IDC_LEAD_IMPLANT_DT: NORMAL
MDC_IDC_LEAD_LOCATION: NORMAL
MDC_IDC_LEAD_LOCATION: NORMAL
MDC_IDC_LEAD_MFG: NORMAL
MDC_IDC_LEAD_MFG: NORMAL
MDC_IDC_LEAD_MODEL: NORMAL
MDC_IDC_LEAD_MODEL: NORMAL
MDC_IDC_LEAD_POLARITY_TYPE: NORMAL
MDC_IDC_LEAD_POLARITY_TYPE: NORMAL
MDC_IDC_LEAD_SERIAL: NORMAL
MDC_IDC_LEAD_SERIAL: NORMAL
MDC_IDC_LEAD_SPECIAL_FUNCTION: NORMAL
MDC_IDC_LEAD_SPECIAL_FUNCTION: NORMAL
MDC_IDC_PG_IMPLANT_DTM: NORMAL
MDC_IDC_PG_MFG: NORMAL
MDC_IDC_PG_MODEL: NORMAL
MDC_IDC_PG_SERIAL: NORMAL
MDC_IDC_PG_TYPE: NORMAL
MDC_IDC_SESS_CLINIC_NAME: NORMAL
MDC_IDC_SESS_DTM: NORMAL
MDC_IDC_SESS_TYPE: NORMAL

## 2019-01-01 ENCOUNTER — ANCILLARY PROCEDURE (OUTPATIENT)
Dept: CARDIOLOGY | Facility: CLINIC | Age: 84
End: 2019-01-01
Attending: INTERNAL MEDICINE
Payer: MEDICARE

## 2019-01-01 DIAGNOSIS — I25.10 CORONARY ARTERY DISEASE INVOLVING NATIVE CORONARY ARTERY OF NATIVE HEART WITHOUT ANGINA PECTORIS: ICD-10-CM

## 2019-01-01 DIAGNOSIS — I49.5 SSS (SICK SINUS SYNDROME) (H): ICD-10-CM

## 2019-01-01 DIAGNOSIS — Z95.0 CARDIAC PACEMAKER IN SITU: ICD-10-CM

## 2019-01-01 DIAGNOSIS — E78.2 MIXED HYPERLIPIDEMIA: ICD-10-CM

## 2019-01-01 LAB
MDC_IDC_EPISODE_DTM: NORMAL
MDC_IDC_EPISODE_ID: NORMAL
MDC_IDC_EPISODE_TYPE: NORMAL
MDC_IDC_LEAD_IMPLANT_DT: NORMAL
MDC_IDC_LEAD_IMPLANT_DT: NORMAL
MDC_IDC_LEAD_LOCATION: NORMAL
MDC_IDC_LEAD_LOCATION: NORMAL
MDC_IDC_LEAD_MFG: NORMAL
MDC_IDC_LEAD_MFG: NORMAL
MDC_IDC_LEAD_MODEL: NORMAL
MDC_IDC_LEAD_MODEL: NORMAL
MDC_IDC_LEAD_POLARITY_TYPE: NORMAL
MDC_IDC_LEAD_POLARITY_TYPE: NORMAL
MDC_IDC_LEAD_SERIAL: NORMAL
MDC_IDC_LEAD_SERIAL: NORMAL
MDC_IDC_LEAD_SPECIAL_FUNCTION: NORMAL
MDC_IDC_LEAD_SPECIAL_FUNCTION: NORMAL
MDC_IDC_MSMT_BATTERY_DTM: NORMAL
MDC_IDC_MSMT_BATTERY_REMAINING_LONGEVITY: 150 MO
MDC_IDC_MSMT_BATTERY_REMAINING_PERCENTAGE: 100 %
MDC_IDC_MSMT_BATTERY_STATUS: NORMAL
MDC_IDC_MSMT_LEADCHNL_RA_IMPEDANCE_VALUE: 465 OHM
MDC_IDC_MSMT_LEADCHNL_RV_IMPEDANCE_VALUE: 515 OHM
MDC_IDC_MSMT_LEADCHNL_RV_PACING_THRESHOLD_AMPLITUDE: 2.1 V
MDC_IDC_MSMT_LEADCHNL_RV_PACING_THRESHOLD_PULSEWIDTH: 0.4 MS
MDC_IDC_PG_IMPLANT_DTM: NORMAL
MDC_IDC_PG_MFG: NORMAL
MDC_IDC_PG_MODEL: NORMAL
MDC_IDC_PG_SERIAL: NORMAL
MDC_IDC_PG_TYPE: NORMAL
MDC_IDC_SESS_CLINIC_NAME: NORMAL
MDC_IDC_SESS_DTM: NORMAL
MDC_IDC_SESS_TYPE: NORMAL
MDC_IDC_SET_BRADY_AT_MODE_SWITCH_MODE: NORMAL
MDC_IDC_SET_BRADY_AT_MODE_SWITCH_RATE: 170 {BEATS}/MIN
MDC_IDC_SET_BRADY_LOWRATE: 70 {BEATS}/MIN
MDC_IDC_SET_BRADY_MAX_SENSOR_RATE: 130 {BEATS}/MIN
MDC_IDC_SET_BRADY_MAX_TRACKING_RATE: 130 {BEATS}/MIN
MDC_IDC_SET_BRADY_MODE: NORMAL
MDC_IDC_SET_BRADY_PAV_DELAY_HIGH: 220 MS
MDC_IDC_SET_BRADY_PAV_DELAY_LOW: 400 MS
MDC_IDC_SET_BRADY_SAV_DELAY_HIGH: 220 MS
MDC_IDC_SET_BRADY_SAV_DELAY_LOW: 400 MS
MDC_IDC_SET_LEADCHNL_RA_PACING_AMPLITUDE: 2 V
MDC_IDC_SET_LEADCHNL_RA_PACING_CAPTURE_MODE: NORMAL
MDC_IDC_SET_LEADCHNL_RA_PACING_POLARITY: NORMAL
MDC_IDC_SET_LEADCHNL_RA_PACING_PULSEWIDTH: 0.5 MS
MDC_IDC_SET_LEADCHNL_RA_SENSING_ADAPTATION_MODE: NORMAL
MDC_IDC_SET_LEADCHNL_RA_SENSING_POLARITY: NORMAL
MDC_IDC_SET_LEADCHNL_RA_SENSING_SENSITIVITY: 0.25 MV
MDC_IDC_SET_LEADCHNL_RV_PACING_AMPLITUDE: 2.6 V
MDC_IDC_SET_LEADCHNL_RV_PACING_CAPTURE_MODE: NORMAL
MDC_IDC_SET_LEADCHNL_RV_PACING_POLARITY: NORMAL
MDC_IDC_SET_LEADCHNL_RV_PACING_PULSEWIDTH: 0.4 MS
MDC_IDC_SET_LEADCHNL_RV_SENSING_ADAPTATION_MODE: NORMAL
MDC_IDC_SET_LEADCHNL_RV_SENSING_POLARITY: NORMAL
MDC_IDC_SET_LEADCHNL_RV_SENSING_SENSITIVITY: 1.5 MV
MDC_IDC_SET_ZONE_DETECTION_INTERVAL: 375 MS
MDC_IDC_SET_ZONE_TYPE: NORMAL
MDC_IDC_SET_ZONE_VENDOR_TYPE: NORMAL
MDC_IDC_STAT_AT_BURDEN_PERCENT: 0 %
MDC_IDC_STAT_AT_DTM_END: NORMAL
MDC_IDC_STAT_AT_DTM_START: NORMAL
MDC_IDC_STAT_BRADY_DTM_END: NORMAL
MDC_IDC_STAT_BRADY_DTM_START: NORMAL
MDC_IDC_STAT_BRADY_RA_PERCENT_PACED: 100 %
MDC_IDC_STAT_BRADY_RV_PERCENT_PACED: 6 %
MDC_IDC_STAT_EPISODE_RECENT_COUNT: 0
MDC_IDC_STAT_EPISODE_RECENT_COUNT_DTM_END: NORMAL
MDC_IDC_STAT_EPISODE_RECENT_COUNT_DTM_START: NORMAL
MDC_IDC_STAT_EPISODE_TYPE: NORMAL
MDC_IDC_STAT_EPISODE_VENDOR_TYPE: NORMAL

## 2019-01-01 PROCEDURE — 93294 REM INTERROG EVL PM/LDLS PM: CPT | Performed by: INTERNAL MEDICINE

## 2019-01-01 PROCEDURE — 93296 REM INTERROG EVL PM/IDS: CPT | Performed by: INTERNAL MEDICINE

## 2019-01-01 RX ORDER — EZETIMIBE 10 MG/1
10 TABLET ORAL DAILY
Qty: 90 TABLET | Refills: 1 | Status: SHIPPED | OUTPATIENT
Start: 2019-01-01 | End: 2020-01-01

## 2019-02-01 ENCOUNTER — DOCUMENTATION ONLY (OUTPATIENT)
Dept: CARDIOLOGY | Facility: CLINIC | Age: 84
End: 2019-02-01

## 2019-02-12 ENCOUNTER — ANCILLARY PROCEDURE (OUTPATIENT)
Dept: CARDIOLOGY | Facility: CLINIC | Age: 84
End: 2019-02-12
Attending: INTERNAL MEDICINE
Payer: MEDICARE

## 2019-02-12 DIAGNOSIS — I49.5 SSS (SICK SINUS SYNDROME) (H): ICD-10-CM

## 2019-02-16 LAB
MDC_IDC_LEAD_IMPLANT_DT: NORMAL
MDC_IDC_LEAD_IMPLANT_DT: NORMAL
MDC_IDC_LEAD_LOCATION: NORMAL
MDC_IDC_LEAD_LOCATION: NORMAL
MDC_IDC_LEAD_MFG: NORMAL
MDC_IDC_LEAD_MFG: NORMAL
MDC_IDC_LEAD_MODEL: NORMAL
MDC_IDC_LEAD_MODEL: NORMAL
MDC_IDC_LEAD_POLARITY_TYPE: NORMAL
MDC_IDC_LEAD_POLARITY_TYPE: NORMAL
MDC_IDC_LEAD_SERIAL: NORMAL
MDC_IDC_LEAD_SERIAL: NORMAL
MDC_IDC_LEAD_SPECIAL_FUNCTION: NORMAL
MDC_IDC_LEAD_SPECIAL_FUNCTION: NORMAL
MDC_IDC_PG_IMPLANT_DTM: NORMAL
MDC_IDC_PG_MFG: NORMAL
MDC_IDC_PG_MODEL: NORMAL
MDC_IDC_PG_SERIAL: NORMAL
MDC_IDC_PG_TYPE: NORMAL
MDC_IDC_SESS_CLINIC_NAME: NORMAL
MDC_IDC_SESS_DTM: NORMAL
MDC_IDC_SESS_TYPE: NORMAL

## 2019-03-12 ENCOUNTER — ANCILLARY PROCEDURE (OUTPATIENT)
Dept: CARDIOLOGY | Facility: CLINIC | Age: 84
End: 2019-03-12
Attending: INTERNAL MEDICINE
Payer: MEDICARE

## 2019-03-12 DIAGNOSIS — I49.5 SSS (SICK SINUS SYNDROME) (H): ICD-10-CM

## 2019-03-12 PROCEDURE — 93293 PM PHONE R-STRIP DEVICE EVAL: CPT | Performed by: INTERNAL MEDICINE

## 2019-04-18 ENCOUNTER — ANCILLARY PROCEDURE (OUTPATIENT)
Dept: CARDIOLOGY | Facility: CLINIC | Age: 84
End: 2019-04-18
Attending: INTERNAL MEDICINE
Payer: MEDICARE

## 2019-04-18 DIAGNOSIS — I49.5 SSS (SICK SINUS SYNDROME) (H): ICD-10-CM

## 2019-04-30 NOTE — ADDENDUM NOTE
Addended by: PENFIELD, LYNETTE EILEEN BARTELT on: 4/30/2019 06:03 PM     Modules accepted: Orders

## 2019-04-30 NOTE — ADDENDUM NOTE
Addended by: PENFIELD, LYNETTE EILEEN BARTELT on: 4/30/2019 06:06 PM     Modules accepted: Orders

## 2019-05-06 ENCOUNTER — OFFICE VISIT (OUTPATIENT)
Dept: CARDIOLOGY | Facility: CLINIC | Age: 84
End: 2019-05-06
Payer: MEDICARE

## 2019-05-06 VITALS
DIASTOLIC BLOOD PRESSURE: 60 MMHG | WEIGHT: 179.4 LBS | HEART RATE: 70 BPM | SYSTOLIC BLOOD PRESSURE: 118 MMHG | BODY MASS INDEX: 28.83 KG/M2 | HEIGHT: 66 IN

## 2019-05-06 DIAGNOSIS — I48.0 PAROXYSMAL ATRIAL FIBRILLATION (H): ICD-10-CM

## 2019-05-06 DIAGNOSIS — I25.10 CORONARY ARTERY DISEASE INVOLVING NATIVE CORONARY ARTERY OF NATIVE HEART WITHOUT ANGINA PECTORIS: ICD-10-CM

## 2019-05-06 DIAGNOSIS — E78.2 MIXED HYPERLIPIDEMIA: ICD-10-CM

## 2019-05-06 DIAGNOSIS — Z95.0 CARDIAC PACEMAKER IN SITU: ICD-10-CM

## 2019-05-06 DIAGNOSIS — I48.0 PAROXYSMAL A-FIB (H): ICD-10-CM

## 2019-05-06 DIAGNOSIS — I49.5 SSS (SICK SINUS SYNDROME) (H): Primary | ICD-10-CM

## 2019-05-06 PROCEDURE — 93000 ELECTROCARDIOGRAM COMPLETE: CPT | Performed by: INTERNAL MEDICINE

## 2019-05-06 PROCEDURE — 99214 OFFICE O/P EST MOD 30 MIN: CPT | Performed by: INTERNAL MEDICINE

## 2019-05-06 ASSESSMENT — MIFFLIN-ST. JEOR: SCORE: 1411.5

## 2019-05-06 NOTE — LETTER
5/6/2019    Edmundo Roblero MD  [address] [address]    RE: Balta Drake       Dear Colleague,    I had the pleasure of seeing Balta Drake in the [facility].    HPI and Plan:   See dictation    Orders Placed This Encounter   Procedures     EKG 12-lead complete w/read - Clinics     General PFT Lab (Please always keep checked)     Pulmonary Function Test     General PFT Lab (Please always keep checked)     Pulmonary Function Test       No orders of the defined types were placed in this encounter.      There are no discontinued medications.      Encounter Diagnoses   Name Primary?     SSS (sick sinus syndrome) (H) Yes     Paroxysmal A-fib (H)      Cardiac pacemaker in situ      Mixed hyperlipidemia      Coronary artery disease involving native coronary artery of native heart without angina pectoris      Paroxysmal atrial fibrillation (H)        CURRENT MEDICATIONS:  Current Outpatient Medications   Medication Sig Dispense Refill     Acetaminophen (TYLENOL PO) Take by mouth every 4 hours as needed        amiodarone (PACERONE/CODARONE) 100 MG TABS tablet Take 1 tablet (100 mg) by mouth daily 90 tablet 3     apixaban ANTICOAGULANT (ELIQUIS) 5 MG tablet Take 1 tablet (5 mg) by mouth 2 times daily 180 tablet 3     celecoxib (CELEBREX) 200 MG capsule Take 1 capsule by mouth daily. 90 capsule 3     ezetimibe-simvastatin (VYTORIN) 10-40 MG per tablet Take 1 tablet by mouth At Bedtime 90 tablet 3     metoprolol succinate (TOPROL-XL) 50 MG 24 hr tablet Take 1 tablet (50 mg) by mouth daily 90 tablet 3     senna-docusate (SENOKOT-S;PERICOLACE) 8.6-50 MG per tablet Take 1-2 tablets by mouth 2 times daily Continue while taking narcotic pain medications. Hold for loose stools. 60 tablet 1     tamsulosin (FLOMAX) 0.4 MG 24 hr capsule Take 0.4 mg by mouth daily         ALLERGIES   No Known Allergies    PAST MEDICAL HISTORY:  Past Medical History:    Diagnosis Date     A-fib (H) 2003 and 2005    cardioverted 2005     Abdominal pain 2007 and 2009    ct neg, egd nl both 2009     Anal fissure      Aortic root dilatation (H)      Ascending aorta dilatation (H)      ASCVD (arteriosclerotic cardiovascular disease) 2003    ptca, angio 6/13/14-medical mgmt     Cardiomyopathy (H)      Colonic polyp     fu colonoscopy 2009 one polyp     Diverticulitis 2005     Hyperlipidaemia      Hypertension      Myocardial infarction (H)     S/P Unspecified     Noninfectious ileitis     diverticulitis     NSVT (nonsustained ventricular tachycardia) (H)      PMR (polymyalgia rheumatica) (H) 2001     Sick sinus syndrome (H) 2003    dual chamber pacemaker implanted 2003, generator chagne 2009     Spinal stenosis      Unspecified essential hypertension        PAST SURGICAL HISTORY:  Past Surgical History:   Procedure Laterality Date     APPENDECTOMY       APPENDECTOMY       ARTHROPLASTY HIP Right 7/7/2015    Procedure: ARTHROPLASTY HIP;  Surgeon: Marah Durham MD;  Location:  OR     ARTHROSCOPY KNEE  2003     C TOTAL HIP ARTHROPLASTY  2007    left     COLONOSCOPY      polyp     ENT SURGERY      tonsillectomy     GRAFT BONE FROM ILIAC CREST  7/9/2014    Procedure: GRAFT BONE FROM ILIAC CREST;  Surgeon: Jose Francisco Anderson MD;  Location:  OR     [facility] stent       HEART CATH CORONARY ANGIOGRAM W/LV GRAM  6/2014    chronic occlusion ramus, significant narrowing at origin of 2nd marginal branch     HERNIA REPAIR       HERNIA REPAIR       IMPLANT PACEMAKER  3/03    6/09 generator replacement     LAMINECTOMY, FUSION LUMBAR ONE LEVEL, COMBINED  7/9/2014    Procedure: COMBINED LAMINECTOMY, FUSION LUMBAR ONE LEVEL;  Surgeon: Jose Francisco Anderson MD;  Location:  OR     lumbar disc surgery  2006     lumbar disc surgery         FAMILY HISTORY:  Family History   Problem Relation Age of Onset     Cardiovascular Father      Chronic Obstructive Pulmonary Disease Father      Heart Disease Mother       Breast Cancer Mother      Lung Cancer Brother      Other - See Comments Brother         something heart related       SOCIAL HISTORY:  Social History     Socioeconomic History     Marital status:      Spouse name: None     Number of children: 7     Years of education: None     Highest education level: None   Occupational History     Employer: RETIRED   Social Needs     Financial resource strain: None     Food insecurity:     Worry: None     Inability: None     Transportation needs:     Medical: None     Non-medical: None   Tobacco Use     Smoking status: Former Smoker     Years: 20.00     Types: Cigarettes     Start date:      Last attempt to quit: 1980     Years since quittin.3     Smokeless tobacco: Never Used   Substance and Sexual Activity     Alcohol use: Yes     Comment: 5 drinks week     Drug use: No     Sexual activity: Not Currently   Lifestyle     Physical activity:     Days per week: None     Minutes per session: None     Stress: None   Relationships     Social connections:     Talks on phone: None     Gets together: None     Attends [employer] service: None     Active member of club or organization: None     Attends meetings of clubs or organizations: None     Relationship status: None     Intimate partner violence:     Fear of current or ex partner: None     Emotionally abused: None     Physically abused: None     Forced sexual activity: None   Other Topics Concern      Service Not Asked     Blood Transfusions Not Asked     Caffeine Concern No     Comment: rare     Occupational Exposure Not Asked     Hobby Hazards Not Asked     Sleep Concern Yes     Comment: d/t using bathroom     Stress Concern Not Asked     Weight Concern No     Special Diet No     Back Care Not Asked     Exercise No     Comment: back pain      Bike Helmet Not Asked     Seat Belt Yes     Self-Exams Not Asked     Parent/sibling w/ CABG, MI or angioplasty before 65F 55M? Not Asked   Social History  "Narrative     None       Review of Systems:  Skin:  Negative       Eyes:  Negative      ENT:  Positive for hearing loss post nasal drip   Respiratory:  Positive for dyspnea on exertion with walking   Cardiovascular:    Positive for;exercise intolerance;fatigue    Gastroenterology: Negative      Genitourinary:  Positive for prostate problem;nocturia    Musculoskeletal:  Positive for joint pain;back pain;nocturnal cramping right hip replacement  Neurologic:  Negative      Psychiatric:  Negative      Heme/Lymph/Imm:  Negative      Endocrine:  Negative        Physical Exam:  Vitals: /60   Pulse 70   Ht 1.676 m (5' 6\")   Wt 81.4 kg (179 lb 6.4 oz)   BMI 28.96 kg/m       Constitutional:  cooperative, alert and oriented, well developed, well nourished, in no acute distress overweight      Skin:  warm and dry to the touch, no apparent skin lesions or masses noted   pacemaker incision in the left infraclavicular area was well-healed      Head:  normocephalic, no masses or lesions        Eyes:  pupils equal and round, conjunctivae and lids unremarkable, sclera white, no xanthalasma, EOMS intact, no nystagmus        Lymph:      ENT:  no pallor or cyanosis, dentition good        Neck:  carotid pulses are full and equal bilaterally, JVP normal, no carotid bruit        Respiratory:  normal breath sounds, clear to auscultation, normal A-P diameter, normal symmetry, normal respiratory excursion, no use of accessory muscles         Cardiac: regular rhythm;normal S1 and S2   S4   systolic murmur;LUSB;grade 2        pulses full and equal                                        GI:  not assessed this visit        Extremities and Muscular Skeletal:  no deformities, clubbing, cyanosis, erythema observed;no edema              Neurological:  no gross motor deficits;affect appropriate        Psych:  Alert and Oriented x 3        CC  No referring provider defined for this encounter.                Thank you for allowing me to " participate in the care of your patient.      Sincerely,     Waldo Lyon MD, MD     [facility]    cc:   No referring provider defined for this encounter.

## 2019-05-06 NOTE — PROGRESS NOTES
Service Date: 05/06/2019      CARDIOLOGY FOLLOWUP VISIT      PRIMARY CARE PHYSICIAN:  Dr. Edmundo Roblero.       HISTORY OF PRESENT ILLNESS:  It is my pleasure to see your patient, Balta Drake, who as you know has been followed for many years by Dr. Fran Galo.  This is a patient with a past history of coronary artery disease.  He had a stent placed in an obtuse marginal artery.  He also has a history of paroxysmal atrial fibrillation for which he is on amiodarone and he also has a history of sick sinus syndrome for which he required implantation of a permanent pacemaker.  He is anticoagulated with apixaban without issue.  He is taking chronic amiodarone therapy as I mentioned.  Last pulmonary function testing was a year ago which was in the normal range with no evidence of a drop in DLCO.  I do note that at your office that you checked thyroid function and liver function tests.  He also had a chest x-ray performed.  The chest x-ray showed no infiltrative pulmonary edema.  There was no evidence of pulmonary fibrosis which is important from the point of view of chronic amiodarone therapy.  From the point of view of lab results, his thyroid function was normal with a TSH of 1.4.  Liver function was normal with the exception of a raised bilirubin at 1.9.  However, his alkaline phosphatase, his ALT and AST were normal, which are the liver enzymes that will be specifically affected by amiodarone.  I am not sure why the bilirubin is raised.  His blood pressure was normal today at 118/60.  We performed a 12-lead electrocardiogram.  The 12-lead electrocardiogram today shows a regular rhythm.  It does not appear that he is being paced.  I think he is in sinus rhythm and there appears to be P-wave activity, but I am not 100% sure of this, which is why interrogation of his pacemaker today would be so important.  If the patient is in chronic atrial fibrillation, then the risks of amiodarone outweigh the benefits obviously.   Last lipid profile that I can see was from last June and his lipids looked excellent at that stage.  He is not complaining of any chest pains or chest pressure.  He is complaining of no palpitations.  He does have a history of mild dilatation of the ascending aorta.  His last echocardiogram was in 05/2018.  This showed that the aortic root was borderline dilated at 3.8 cm and the ascending aorta was mildly dilated at 3.8 cm also.  No significant valvular heart disease is present and his EF was normal at 60%-65%, mild inferior and mild inferolateral wall hypokinesis was noted.      IMPRESSION:   1.  Coronary artery disease.  The patient is asymptomatic with respect to coronary artery disease with no symptoms suggestive of angina pectoris.   2.  Paroxysmal atrial fibrillation.  It does appear that the patient is in sinus rhythm, but I cannot be 100% sure of that based upon the 12-lead EKG today.  Interrogation of his permanent pacemaker today which is indicated as he has not had that done in quite some time will be useful with respect to that.   3.  Essential hypertension.  His blood pressure is well controlled.   4.  Chronic amiodarone therapy.  There is no evidence of amiodarone-induced hepatic toxicity or amiodarone-induced thyroid toxicity based upon the labs that were drawn 6 days ago at primary care's office.   5.  Borderline aortic root dilatation and mild ascending aortic dilatation.      PLAN:  Firstly, we will interrogate his device and I believe he is overdue for that.  All of the other interrogations have been transtelephonic rather than in person device checks.  Next, we will check pulmonary function tests this month and in a year's time.  I will have the patient return to see me in a year.  It appears that you are checking the hepatic function and the thyroid function as well as chest x-ray and I would be grateful if you could continue doing that and I will be able to see the results on Care Everywhere.   Also, it appears that in last July you also checked his lipid profile and if that could be done again in July again that will be very helpful also.      Many thanks for allowing me to be involved in the care of this very nice patient.  I look forward to seeing him again.      cc:   Edmundo Roblero MD   [address]  [address]         MITZI MOJICA MD, [facility]             D: 2019   T: 2019   MT: TERRANCE      Name:     VOLODYMYR GONZALES   MRN:      [id number]        Account:      [id number]   :      10/23/1927           Service Date: 2019      Document: [id number]

## 2019-05-06 NOTE — PROGRESS NOTES
HPI and Plan:   See dictation    Orders Placed This Encounter   Procedures     EKG 12-lead complete w/read - Clinics     General PFT Lab (Please always keep checked)     Pulmonary Function Test     General PFT Lab (Please always keep checked)     Pulmonary Function Test       No orders of the defined types were placed in this encounter.      There are no discontinued medications.      Encounter Diagnoses   Name Primary?     SSS (sick sinus syndrome) (H) Yes     Paroxysmal A-fib (H)      Cardiac pacemaker in situ      Mixed hyperlipidemia      Coronary artery disease involving native coronary artery of native heart without angina pectoris      Paroxysmal atrial fibrillation (H)        CURRENT MEDICATIONS:  Current Outpatient Medications   Medication Sig Dispense Refill     Acetaminophen (TYLENOL PO) Take by mouth every 4 hours as needed        amiodarone (PACERONE/CODARONE) 100 MG TABS tablet Take 1 tablet (100 mg) by mouth daily 90 tablet 3     apixaban ANTICOAGULANT (ELIQUIS) 5 MG tablet Take 1 tablet (5 mg) by mouth 2 times daily 180 tablet 3     celecoxib (CELEBREX) 200 MG capsule Take 1 capsule by mouth daily. 90 capsule 3     ezetimibe-simvastatin (VYTORIN) 10-40 MG per tablet Take 1 tablet by mouth At Bedtime 90 tablet 3     metoprolol succinate (TOPROL-XL) 50 MG 24 hr tablet Take 1 tablet (50 mg) by mouth daily 90 tablet 3     senna-docusate (SENOKOT-S;PERICOLACE) 8.6-50 MG per tablet Take 1-2 tablets by mouth 2 times daily Continue while taking narcotic pain medications. Hold for loose stools. 60 tablet 1     tamsulosin (FLOMAX) 0.4 MG 24 hr capsule Take 0.4 mg by mouth daily         ALLERGIES   No Known Allergies    PAST MEDICAL HISTORY:  Past Medical History:   Diagnosis Date     A-fib (H) 2003 and 2005    cardioverted 2005     Abdominal pain 2007 and 2009    ct neg, egd nl both 2009     Anal fissure      Aortic root dilatation (H)      Ascending aorta dilatation (H)      ASCVD (arteriosclerotic  cardiovascular disease) 2003    ptca, angio 6/13/14-medical mgmt     Cardiomyopathy (H)      Colonic polyp     fu colonoscopy 2009 one polyp     Diverticulitis 2005     Hyperlipidaemia      Hypertension      Myocardial infarction (H)     S/P Unspecified     Noninfectious ileitis     diverticulitis     NSVT (nonsustained ventricular tachycardia) (H)      PMR (polymyalgia rheumatica) (H) 2001     Sick sinus syndrome (H) 2003    dual chamber pacemaker implanted 2003, generator chagne 2009     Spinal stenosis      Unspecified essential hypertension        PAST SURGICAL HISTORY:  Past Surgical History:   Procedure Laterality Date     APPENDECTOMY       APPENDECTOMY       ARTHROPLASTY HIP Right 7/7/2015    Procedure: ARTHROPLASTY HIP;  Surgeon: Marah Durham MD;  Location:  OR     ARTHROSCOPY KNEE  2003     C TOTAL HIP ARTHROPLASTY  2007    left     COLONOSCOPY      polyp     ENT SURGERY      tonsillectomy     GRAFT BONE FROM ILIAC CREST  7/9/2014    Procedure: GRAFT BONE FROM ILIAC CREST;  Surgeon: Jose Francisco Anderson MD;  Location:  OR     [facility] stent       HEART CATH CORONARY ANGIOGRAM W/LV GRAM  6/2014    chronic occlusion ramus, significant narrowing at origin of 2nd marginal branch     HERNIA REPAIR       HERNIA REPAIR       IMPLANT PACEMAKER  3/03    6/09 generator replacement     LAMINECTOMY, FUSION LUMBAR ONE LEVEL, COMBINED  7/9/2014    Procedure: COMBINED LAMINECTOMY, FUSION LUMBAR ONE LEVEL;  Surgeon: Jose Francisco Anderson MD;  Location:  OR     lumbar disc surgery  2006     lumbar disc surgery         FAMILY HISTORY:  Family History   Problem Relation Age of Onset     Cardiovascular Father      Chronic Obstructive Pulmonary Disease Father      Heart Disease Mother      Breast Cancer Mother      Lung Cancer Brother      Other - See Comments Brother         something heart related       SOCIAL HISTORY:  Social History     Socioeconomic History     Marital status:      Spouse name: None     Number  of children: 7     Years of education: None     Highest education level: None   Occupational History     Employer: RETIRED   Social Needs     Financial resource strain: None     Food insecurity:     Worry: None     Inability: None     Transportation needs:     Medical: None     Non-medical: None   Tobacco Use     Smoking status: Former Smoker     Years: 20.00     Types: Cigarettes     Start date:      Last attempt to quit: 1980     Years since quittin.3     Smokeless tobacco: Never Used   Substance and Sexual Activity     Alcohol use: Yes     Comment: 5 drinks week     Drug use: No     Sexual activity: Not Currently   Lifestyle     Physical activity:     Days per week: None     Minutes per session: None     Stress: None   Relationships     Social connections:     Talks on phone: None     Gets together: None     Attends [employer] service: None     Active member of club or organization: None     Attends meetings of clubs or organizations: None     Relationship status: None     Intimate partner violence:     Fear of current or ex partner: None     Emotionally abused: None     Physically abused: None     Forced sexual activity: None   Other Topics Concern      Service Not Asked     Blood Transfusions Not Asked     Caffeine Concern No     Comment: rare     Occupational Exposure Not Asked     Hobby Hazards Not Asked     Sleep Concern Yes     Comment: d/t using bathroom     Stress Concern Not Asked     Weight Concern No     Special Diet No     Back Care Not Asked     Exercise No     Comment: back pain      Bike Helmet Not Asked     Seat Belt Yes     Self-Exams Not Asked     Parent/sibling w/ CABG, MI or angioplasty before 65F 55M? Not Asked   Social History Narrative     None       Review of Systems:  Skin:  Negative       Eyes:  Negative      ENT:  Positive for hearing loss post nasal drip   Respiratory:  Positive for dyspnea on exertion with walking   Cardiovascular:    Positive for;exercise  "intolerance;fatigue    Gastroenterology: Negative      Genitourinary:  Positive for prostate problem;nocturia    Musculoskeletal:  Positive for joint pain;back pain;nocturnal cramping right hip replacement  Neurologic:  Negative      Psychiatric:  Negative      Heme/Lymph/Imm:  Negative      Endocrine:  Negative        Physical Exam:  Vitals: /60   Pulse 70   Ht 1.676 m (5' 6\")   Wt 81.4 kg (179 lb 6.4 oz)   BMI 28.96 kg/m      Constitutional:  cooperative, alert and oriented, well developed, well nourished, in no acute distress overweight      Skin:  warm and dry to the touch, no apparent skin lesions or masses noted   pacemaker incision in the left infraclavicular area was well-healed      Head:  normocephalic, no masses or lesions        Eyes:  pupils equal and round, conjunctivae and lids unremarkable, sclera white, no xanthalasma, EOMS intact, no nystagmus        Lymph:      ENT:  no pallor or cyanosis, dentition good        Neck:  carotid pulses are full and equal bilaterally, JVP normal, no carotid bruit        Respiratory:  normal breath sounds, clear to auscultation, normal A-P diameter, normal symmetry, normal respiratory excursion, no use of accessory muscles         Cardiac: regular rhythm;normal S1 and S2   S4   systolic murmur;LUSB;grade 2        pulses full and equal                                        GI:  not assessed this visit        Extremities and Muscular Skeletal:  no deformities, clubbing, cyanosis, erythema observed;no edema              Neurological:  no gross motor deficits;affect appropriate        Psych:  Alert and Oriented x 3        CC  No referring provider defined for this encounter.              "

## 2019-05-06 NOTE — LETTER
5/6/2019      Edmundo Roblero MD  [address] [address]      RE: Balta Drake       Dear Colleague,    I had the pleasure of seeing Balta Drake in the [facility].    Service Date: 05/06/2019      CARDIOLOGY FOLLOWUP VISIT      PRIMARY CARE PHYSICIAN:  Dr. Edmundo Roblero.       HISTORY OF PRESENT ILLNESS:  It is my pleasure to see your patient, Balta Drake, who as you know has been followed for many years by Dr. Fran Galo.  This is a patient with a past history of coronary artery disease.  He had a stent placed in an obtuse marginal artery.  He also has a history of paroxysmal atrial fibrillation for which he is on amiodarone and he also has a history of sick sinus syndrome for which he required implantation of a permanent pacemaker.  He is anticoagulated with apixaban without issue.  He is taking chronic amiodarone therapy as I mentioned.  Last pulmonary function testing was a year ago which was in the normal range with no evidence of a drop in DLCO.  I do note that at your office that you checked thyroid function and liver function tests.  He also had a chest x-ray performed.  The chest x-ray showed no infiltrative pulmonary edema.  There was no evidence of pulmonary fibrosis which is important from the point of view of chronic amiodarone therapy.  From the point of view of lab results, his thyroid function was normal with a TSH of 1.4.  Liver function was normal with the exception of a raised bilirubin at 1.9.  However, his alkaline phosphatase, his ALT and AST were normal, which are the liver enzymes that will be specifically affected by amiodarone.  I am not sure why the bilirubin is raised.  His blood pressure was normal today at 118/60.  We performed a 12-lead electrocardiogram.  The 12-lead electrocardiogram today shows a regular rhythm.  It does not appear that he is being paced.  I think he is in sinus rhythm and there  appears to be P-wave activity, but I am not 100% sure of this, which is why interrogation of his pacemaker today would be so important.  If the patient is in chronic atrial fibrillation, then the risks of amiodarone outweigh the benefits obviously.  Last lipid profile that I can see was from last June and his lipids looked excellent at that stage.  He is not complaining of any chest pains or chest pressure.  He is complaining of no palpitations.  He does have a history of mild dilatation of the ascending aorta.  His last echocardiogram was in 05/2018.  This showed that the aortic root was borderline dilated at 3.8 cm and the ascending aorta was mildly dilated at 3.8 cm also.  No significant valvular heart disease is present and his EF was normal at 60%-65%, mild inferior and mild inferolateral wall hypokinesis was noted.      IMPRESSION:   1.  Coronary artery disease.  The patient is asymptomatic with respect to coronary artery disease with no symptoms suggestive of angina pectoris.   2.  Paroxysmal atrial fibrillation.  It does appear that the patient is in sinus rhythm, but I cannot be 100% sure of that based upon the 12-lead EKG today.  Interrogation of his permanent pacemaker today which is indicated as he has not had that done in quite some time will be useful with respect to that.   3.  Essential hypertension.  His blood pressure is well controlled.   4.  Chronic amiodarone therapy.  There is no evidence of amiodarone-induced hepatic toxicity or amiodarone-induced thyroid toxicity based upon the labs that were drawn 6 days ago at primary care's office.   5.  Borderline aortic root dilatation and mild ascending aortic dilatation.      PLAN:  Firstly, we will interrogate his device and I believe he is overdue for that.  All of the other interrogations have been transtelephonic rather than in person device checks.  Next, we will check pulmonary function tests this month and in a year's time.  I will have the  patient return to see me in a year.  It appears that you are checking the hepatic function and the thyroid function as well as chest x-ray and I would be grateful if you could continue doing that and I will be able to see the results on Care Everywhere.  Also, it appears that in last July you also checked his lipid profile and if that could be done again in July again that will be very helpful also.      Many thanks for allowing me to be involved in the care of this very nice patient.  I look forward to seeing him again.      cc:   [place] [address]         MITZI MOJICA MD, [facility]             D: 2019   T: 2019   MT: TERRANCE      Name:     VOLODYMYR GONZALES   MRN:      [id number]        Account:      [id number]   :      10/23/1927           Service Date: 2019      Document: [id number]         Outpatient Encounter Medications as of 2019   Medication Sig Dispense Refill     Acetaminophen (TYLENOL PO) Take by mouth every 4 hours as needed        amiodarone (PACERONE/CODARONE) 100 MG TABS tablet Take 1 tablet (100 mg) by mouth daily 90 tablet 3     apixaban ANTICOAGULANT (ELIQUIS) 5 MG tablet Take 1 tablet (5 mg) by mouth 2 times daily 180 tablet 3     celecoxib (CELEBREX) 200 MG capsule Take 1 capsule by mouth daily. 90 capsule 3     ezetimibe-simvastatin (VYTORIN) 10-40 MG per tablet Take 1 tablet by mouth At Bedtime 90 tablet 3     metoprolol succinate (TOPROL-XL) 50 MG 24 hr tablet Take 1 tablet (50 mg) by mouth daily 90 tablet 3     senna-docusate (SENOKOT-S;PERICOLACE) 8.6-50 MG per tablet Take 1-2 tablets by mouth 2 times daily Continue while taking narcotic pain medications. Hold for loose stools. 60 tablet 1     tamsulosin (FLOMAX) 0.4 MG 24 hr capsule Take 0.4 mg by mouth daily       Facility-Administered Encounter Medications as of 2019   Medication Dose Route Frequency Provider Last Rate Last Dose      albuterol (PROAIR HFA, PROVENTIL HFA, VENTOLIN HFA) inhaler 2 puff  2 puff Inhalation Q5 Min PRN Ip, Armando Hummel MD         aminophylline injection  mg   mg Intravenous Once PRN Ip, Armando Hummel MD         sodium chloride (PF) 0.9% PF flush 10 mL  10 mL Intravenous Q10 Min PRN Ip, Armando Hummel MD         sodium chloride (PF) 0.9% PF flush 5-10 mL  5-10 mL Intravenous q1 min prn Ip, Armando Hummel MD   10 mL at 06/11/14 1328     sodium chloride bacteriostatic 0.9 % flush 0-1 mL  0-1 mL Intradermal Once PRN Ip, Armando Hummel MD           Again, thank you for allowing me to participate in the care of your patient.      Sincerely,    Waldo Lyon MD, MD     [facility]

## 2019-05-17 ENCOUNTER — ANCILLARY PROCEDURE (OUTPATIENT)
Dept: CARDIOLOGY | Facility: CLINIC | Age: 84
End: 2019-05-17
Attending: INTERNAL MEDICINE
Payer: MEDICARE

## 2019-05-17 DIAGNOSIS — I49.5 SSS (SICK SINUS SYNDROME) (H): ICD-10-CM

## 2019-06-04 ENCOUNTER — HOSPITAL ENCOUNTER (OUTPATIENT)
Dept: CARDIAC REHAB | Facility: CLINIC | Age: 84
End: 2019-06-04
Attending: INTERNAL MEDICINE
Payer: MEDICARE

## 2019-06-04 DIAGNOSIS — I48.0 PAROXYSMAL A-FIB (H): ICD-10-CM

## 2019-06-04 PROCEDURE — 94729 DIFFUSING CAPACITY: CPT

## 2019-06-04 PROCEDURE — 94375 RESPIRATORY FLOW VOLUME LOOP: CPT

## 2019-06-04 PROCEDURE — 40001038 ZZH STATISTIC RESPIRATORY TESTING VISIT

## 2019-06-04 PROCEDURE — 94726 PLETHYSMOGRAPHY LUNG VOLUMES: CPT

## 2019-06-10 LAB
DLCOUNC-PRE: 14.98 ML/MIN/MMHG
ERV-%PRED-PRE: 347 %
ERV-PRE: 1.06 L
ERV-PRED: 0.3 L
EXPTIME-PRE: 6.53 SEC
FEF2575-PRE: 2.71 L/SEC
FEFMAX-%PRED-PRE: 158 %
FEFMAX-PRE: 7.16 L/SEC
FEFMAX-PRED: 4.51 L/SEC
FEV1-%PRED-PRE: 132 %
FEV1-PRE: 2.65 L
FEV1FEV6-PRE: 81 %
FEV1FEV6-PRED: 75 %
FEV1FVC-PRE: 80 %
FEV1FVC-PRED: 75 %
FEV1SVC-PRE: 77 %
FEV1SVC-PRED: 60 %
FIFMAX-PRE: 3.13 L/SEC
FRCPLETH-%PRED-PRE: 106 %
FRCPLETH-PRE: 3.81 L
FRCPLETH-PRED: 3.56 L
FVC-%PRED-PRE: 122 %
FVC-PRE: 3.3 L
FVC-PRED: 2.69 L
IC-%PRED-PRE: 77 %
IC-PRE: 2.32 L
IC-PRED: 3.01 L
RVPLETH-%PRED-PRE: 92 %
RVPLETH-PRE: 2.7 L
RVPLETH-PRED: 2.92 L
TLCPLETH-%PRED-PRE: 101 %
TLCPLETH-PRE: 6.13 L
TLCPLETH-PRED: 6.01 L
VA-PRE: 5.35 L
VC-%PRED-PRE: 103 %
VC-PRE: 3.43 L
VC-PRED: 3.31 L

## 2019-06-13 ENCOUNTER — ANCILLARY PROCEDURE (OUTPATIENT)
Dept: CARDIOLOGY | Facility: CLINIC | Age: 84
End: 2019-06-13
Attending: INTERNAL MEDICINE
Payer: MEDICARE

## 2019-06-13 DIAGNOSIS — I49.5 SSS (SICK SINUS SYNDROME) (H): ICD-10-CM

## 2019-06-13 PROCEDURE — 93293 PM PHONE R-STRIP DEVICE EVAL: CPT | Performed by: INTERNAL MEDICINE

## 2019-07-18 ENCOUNTER — TELEPHONE (OUTPATIENT)
Dept: CARDIOLOGY | Facility: CLINIC | Age: 84
End: 2019-07-18

## 2019-07-18 ENCOUNTER — ANCILLARY PROCEDURE (OUTPATIENT)
Dept: CARDIOLOGY | Facility: CLINIC | Age: 84
End: 2019-07-18
Attending: INTERNAL MEDICINE
Payer: MEDICARE

## 2019-07-18 DIAGNOSIS — I49.5 SSS (SICK SINUS SYNDROME) (H): ICD-10-CM

## 2019-07-18 DIAGNOSIS — Z45.010 PACEMAKER BATTERY DEPLETION: ICD-10-CM

## 2019-07-18 DIAGNOSIS — Z95.0 CARDIAC PACEMAKER IN SITU: Primary | ICD-10-CM

## 2019-07-23 ENCOUNTER — APPOINTMENT (OUTPATIENT)
Dept: GENERAL RADIOLOGY | Facility: CLINIC | Age: 84
End: 2019-07-23
Attending: EMERGENCY MEDICINE
Payer: MEDICARE

## 2019-07-23 ENCOUNTER — HOSPITAL ENCOUNTER (EMERGENCY)
Facility: CLINIC | Age: 84
Discharge: HOME | End: 2019-07-23
Attending: EMERGENCY MEDICINE | Admitting: EMERGENCY MEDICINE
Payer: MEDICARE

## 2019-07-23 VITALS
RESPIRATION RATE: 14 BRPM | TEMPERATURE: 98.2 F | SYSTOLIC BLOOD PRESSURE: 105 MMHG | WEIGHT: 180 LBS | OXYGEN SATURATION: 96 % | HEART RATE: 70 BPM | DIASTOLIC BLOOD PRESSURE: 83 MMHG | BODY MASS INDEX: 28.93 KG/M2 | HEIGHT: 66 IN

## 2019-07-23 DIAGNOSIS — S29.019A THORACIC MYOFASCIAL STRAIN, INITIAL ENCOUNTER: ICD-10-CM

## 2019-07-23 LAB
ANION GAP SERPL CALCULATED.3IONS-SCNC: 6 MMOL/L (ref 3–14)
BASOPHILS # BLD AUTO: 0 10E9/L (ref 0–0.2)
BASOPHILS NFR BLD AUTO: 0.3 %
BUN SERPL-MCNC: 14 MG/DL (ref 7–30)
CALCIUM SERPL-MCNC: 8.3 MG/DL (ref 8.5–10.1)
CHLORIDE SERPL-SCNC: 103 MMOL/L (ref 94–109)
CO2 SERPL-SCNC: 26 MMOL/L (ref 20–32)
CREAT SERPL-MCNC: 0.92 MG/DL (ref 0.66–1.25)
DIFFERENTIAL METHOD BLD: ABNORMAL
EOSINOPHIL # BLD AUTO: 0 10E9/L (ref 0–0.7)
EOSINOPHIL NFR BLD AUTO: 0.2 %
ERYTHROCYTE [DISTWIDTH] IN BLOOD BY AUTOMATED COUNT: 12.6 % (ref 10–15)
GFR SERPL CREATININE-BSD FRML MDRD: 72 ML/MIN/{1.73_M2}
GLUCOSE SERPL-MCNC: 103 MG/DL (ref 70–99)
HCT VFR BLD AUTO: 38.3 % (ref 40–53)
HGB BLD-MCNC: 13.8 G/DL (ref 13.3–17.7)
IMM GRANULOCYTES # BLD: 0 10E9/L (ref 0–0.4)
IMM GRANULOCYTES NFR BLD: 0.3 %
INTERPRETATION ECG - MUSE: NORMAL
LYMPHOCYTES # BLD AUTO: 1.4 10E9/L (ref 0.8–5.3)
LYMPHOCYTES NFR BLD AUTO: 13.6 %
MCH RBC QN AUTO: 36.5 PG (ref 26.5–33)
MCHC RBC AUTO-ENTMCNC: 36 G/DL (ref 31.5–36.5)
MCV RBC AUTO: 101 FL (ref 78–100)
MONOCYTES # BLD AUTO: 1.2 10E9/L (ref 0–1.3)
MONOCYTES NFR BLD AUTO: 11.2 %
NEUTROPHILS # BLD AUTO: 7.7 10E9/L (ref 1.6–8.3)
NEUTROPHILS NFR BLD AUTO: 74.4 %
NRBC # BLD AUTO: 0 10*3/UL
NRBC BLD AUTO-RTO: 0 /100
PLATELET # BLD AUTO: 184 10E9/L (ref 150–450)
POTASSIUM SERPL-SCNC: 3.8 MMOL/L (ref 3.4–5.3)
RBC # BLD AUTO: 3.78 10E12/L (ref 4.4–5.9)
SODIUM SERPL-SCNC: 135 MMOL/L (ref 133–144)
TROPONIN I SERPL-MCNC: <0.015 UG/L (ref 0–0.04)
WBC # BLD AUTO: 10.3 10E9/L (ref 4–11)

## 2019-07-23 PROCEDURE — 99285 EMERGENCY DEPT VISIT HI MDM: CPT

## 2019-07-23 PROCEDURE — 93005 ELECTROCARDIOGRAM TRACING: CPT

## 2019-07-23 PROCEDURE — 25000132 ZZH RX MED GY IP 250 OP 250 PS 637: Mod: GY | Performed by: EMERGENCY MEDICINE

## 2019-07-23 PROCEDURE — 85025 COMPLETE CBC W/AUTO DIFF WBC: CPT | Performed by: EMERGENCY MEDICINE

## 2019-07-23 PROCEDURE — 80048 BASIC METABOLIC PNL TOTAL CA: CPT | Performed by: EMERGENCY MEDICINE

## 2019-07-23 PROCEDURE — 72072 X-RAY EXAM THORAC SPINE 3VWS: CPT

## 2019-07-23 PROCEDURE — 84484 ASSAY OF TROPONIN QUANT: CPT | Performed by: EMERGENCY MEDICINE

## 2019-07-23 RX ORDER — TRAMADOL HYDROCHLORIDE 50 MG/1
50 TABLET ORAL EVERY 6 HOURS PRN
Qty: 10 TABLET | Refills: 0 | Status: SHIPPED | OUTPATIENT
Start: 2019-07-23 | End: 2019-07-26

## 2019-07-23 RX ORDER — TRAMADOL HYDROCHLORIDE 50 MG/1
50 TABLET ORAL ONCE
Status: COMPLETED | OUTPATIENT
Start: 2019-07-23 | End: 2019-07-23

## 2019-07-23 RX ADMIN — TRAMADOL HYDROCHLORIDE 50 MG: 50 TABLET, COATED ORAL at 09:54

## 2019-07-23 ASSESSMENT — ENCOUNTER SYMPTOMS
SHORTNESS OF BREATH: 1
VOMITING: 0
BACK PAIN: 1
NAUSEA: 0
DIARRHEA: 0

## 2019-07-23 ASSESSMENT — MIFFLIN-ST. JEOR: SCORE: 1414.22

## 2019-07-23 NOTE — ED AVS SNAPSHOT
Emergency Department  [address] [place] [id number]  Phone:  [phone]  Fax:  [phone]                                    Balta Drake   MRN: [id number]    Department:   Emergency Department   Date of Visit:  7/23/2019           After Visit Summary Signature Page    I have received my discharge instructions, and my questions have been answered. I have discussed any challenges I see with this plan with the nurse or doctor.    ..........................................................................................................................................  Patient/Patient Representative Signature      ..........................................................................................................................................  Patient Representative Print Name and Relationship to Patient    ..................................................               ................................................  Date                                   Time    ..........................................................................................................................................  Reviewed by Signature/Title    ...................................................              ..............................................  Date                                               Time          22EPIC Rev 08/18

## 2019-07-23 NOTE — ED NOTES
Bed: ED19  Expected date: 7/23/19  Expected time: 9:18 AM  Means of arrival: Ambulance  Comments:  [place]1 89m back pain, cardiac?  ETA [id number]

## 2019-07-23 NOTE — ED PROVIDER NOTES
History     Chief Complaint:  Back Pain    The history is provided by the patient and the EMS personnel.      Balta Drake is a 89 year old male with a history of heart attacks and atrial fibrillation on Eliquis who presents with back pain.  The patient indicates that last night, he had severe pain in his back and once in bed, almost immediately had pain in his side. He states that it was difficult to move positions secondary to the pain.  He describes this pain as being similar to his previous heart attack with the pain radiating from the same location in his left side of his back. The patient notes that the pain gets better or worse depending on position.The patient reports that he has had mild shortness of breath from the pain. This morning, the patient's daughter came over to his house to check up on him. The patient reports that this morning his pain level was 2/10 on the severity scale, however the pain gradually worsened to where the EMS were called and brought him in to the ED. Per EMS, the patient was given Tylenol and 324 mg of aspirin, but not nitroglycerin.  He had a recorded blood sugar level of 111 and 140/70 blood pressure. The patient denies nausea, vomiting, diarrhea, or chest pain. The patient denies a history of DVT.     Allergies:  NKDA    Medications:    Lexapro  Flomax  Pacerone  Eliquis  Vytorin  Toprol  Senokot    Past Medical History:    A-fib  Anal fissure  Aortic root dilation  Ascending aorta dilation  ASCVD  Cardiomyopathy  Colonic polyp  Diverticulitis  HLD  Hypercholesterolemia  HTN  Myocardial infarction  Noninfectious ileitis  NSVT  PMR  Sick sinus syndrome  Spinal stenosis  Osteoarthrosis   Cardiac pacemaker  Heart attack    Past Surgical History:    Appendectomy  Arthroscopy knee  C Total hip arthroplasty, left  ENT surgery  Graft bone from iliac crest  Heart cath coronary angiogram W/LV gram  Hernia repair  Implant pacemaker  Laminectomy, fusion lumbar one level  Lumbar disc  "surgery    Family History:    Cardiovascular  COPD  Heart disease  Cancer, breat  Cancer, lung    Social History:  Presents by EMS  Marital Status:    Former smoker: 38.6 years since quitting  Alcohol use: 5 drinks/week    Review of Systems   Respiratory: Positive for shortness of breath (Mild).    Cardiovascular: Negative for chest pain.   Gastrointestinal: Negative for diarrhea, nausea and vomiting.   Musculoskeletal: Positive for back pain.   All other systems reviewed and are negative.    Physical Exam     Patient Vitals for the past 24 hrs:   BP Temp Temp src Pulse Heart Rate Resp SpO2 Height Weight   07/23/19 1130 105/83 -- -- 70 -- -- 96 % -- --   07/23/19 1115 115/64 -- -- 70 -- -- 95 % -- --   07/23/19 1100 109/57 -- -- 70 -- -- 94 % -- --   07/23/19 1045 112/54 -- -- 70 -- -- 93 % -- --   07/23/19 1030 111/55 -- -- 70 -- -- 97 % -- --   07/23/19 0948 120/72 -- -- 70 -- -- 97 % -- --   07/23/19 0945 111/75 -- -- 70 -- -- 96 % -- --   07/23/19 0944 130/83 98.2  F (36.8  C) Oral -- 70 14 99 % 1.676 m (5' 6\") 81.6 kg (180 lb)       Physical Exam  Nursing note and vitals reviewed.    Constitutional:  Appears well-developed and well-nourished, comfortable. Grimaces in response to movement.    HENT:    Nose normal.  No discharge.      Oropharynx is clear and moist.  Eyes:    Conjunctivae are normal without injection. No lid droop.  Lymph:  No enlarged or tender cervical or submandibular lymph nodes.   Cardiovascular:  Normal rate, regular rhythm with normal S1 and S2.      Pacemaker noted left chest wall.      Normal heart sounds and peripheral pulses 2+ and equal.       No murmur or katiana.  Pulmonary:  Effort normal and breath sounds clear to auscultation bilaterally. No respiratory distress.  No stridor.     No wheezes. No rales.     GI:    Soft. No distension and no mass. No tenderness.   Musculoskeletal:  No edema. Reproducible tenderness on left lateral side to the mid thoracic spine next to the " scapula over the rhomboid muscle.                                        Neck supple, no midline cervical or thoracic tenderness.   Neurological:   Alert and oriented. No cranial nerve deficit, no facial droop.     Exhibits good muscle tone. Coordination normal.  No focal weakness.     GCS eye subscore is 4. GCS verbal subscore is 5.      GCS motor subscore is 6.   Skin:    Skin is warm and dry. No rash noted. No diaphoresis.      No erythema. No pallor.  No lesions.  Psychiatric:   Behavior is normal. Appropriate mood and affect.     Judgment and thought content normal.     Emergency Department Course   ECG:  Time: 0950  Vent. Rate 70 bpm. ND interval 274. QRS duration 128. QT/QTc 420/453. P-R-T axis * -55 40.  Atrial-paced rhythm with prolonged AV conduction  Left axis deviation  Nonspecific intraventricular block  Cannot rule out Septal infarct, age undetermined  Abnormal ECG  No significant changed compared to EKG dated 08/18/14  Read time: 955    Imaging:  Radiographic findings were communicated with the patient who voiced understanding of the findings.    Thoracic spine XR, 3 Views:  Multilevel degenerative disc disease most prominent at  L1-L2. Mild right convex thoracic curvature. No definite evidence of  compression fracture. As per radiology.    Laboratory:  Troponin I: (0948) <0.015  CBC: WBC: 10.3, HGB: 13.8, PLT: 184  BMP: Glucose 103(H), Calcium: 8.3(L), o/w WNL (Creatinine: 0.92)    Interventions:  0954 Ultram 50 mg Oral    Emergency Department Course:  Nursing notes and vitals reviewed.    0933 History was provided by EMS    0934 I examined the patient as noted above.    0948 IV was inserted and blood was drawn for laboratory testing, results above.    0953 The patient was sent for a Thoracic Spine XR while in the emergency department, results above.     1010 I rechecked the patient.    1134 Findings and plan explained to the Patient. Patient discharged home with instructions regarding supportive  care, medications, and reasons to return. The importance of close follow-up was reviewed. The patient was prescribed tramadol.  Impression & Plan      Medical Decision Making:  Patient comes to the ED with pain just medial to his left lower scapula.  I can reproduce with palpation and hurts when he moves.  No midline spinal tenderness.  He is on a blood thinner but he has no new neurologic symptoms and his pain is off to the side and not over the spine.  I did not feel he needed an MRI at this time.  X-ray of his thoracic spine does not show any acute compression fracture and there has been no fall in the recent past.  His troponin is normal and EKG is unremarkable.  He has a paced rhythm.  He does have a history of coronary disease and said he had some back pain with that was in the past and that is why did the work-up for the heart.  His CBC and basic panel are otherwise unremarkable.  He was given a tramadol and it definitely helped his pain.  He was able to get up and move around with much less tenderness.  I believe this is a myofascial strain and will treat conservatively at this time and he may benefit from some physical therapy down the road.    Heat and/or ice to the sore area on your back, Tylenol 2 tablets 3-4 times a day as needed for pain, tramadol as needed for more severe pain.  Recheck in the clinic this week with your doctor if not resolving for physical therapy.  Use your walker to prevent falls.    Diagnosis:    ICD-10-CM    1. Thoracic myofascial strain, initial encounter S29.019A      Disposition:  discharged to home with tramadol.    Discharge Medications:     Medication List      Started    traMADol 50 MG tablet  Commonly known as:  ULTRAM  50 mg, Oral, EVERY 6 HOURS PRN          Giovana LOO, gricelda serving as a scribe on 7/23/2019 at 1:54 PM to personally document services performed by Sharon Landaverde MD. providers found based on my observations and the provider's statements to me.     I  Nohelia Woodard, am serving as a scribe at 1:57 PM on 7/23/2019 to document services personally performed by Sharon Landaverde MD based on my observations and the provider's statements to me.   EMERGENCY DEPARTMENT       Sharon Landaverde MD  07/23/19 [id number]

## 2019-07-23 NOTE — ED TRIAGE NOTES
Upper back pin started last night, worse with palpation and moven=ment, abd lateral bilat pain is what started , similar to heart attack , EMS gave 324 asa, a fib hx, has pacemaker LBBB for EMS , 2/10 pain now , 140/70

## 2019-07-26 ENCOUNTER — OFFICE VISIT (OUTPATIENT)
Dept: CARDIOLOGY | Facility: CLINIC | Age: 84
End: 2019-07-26
Attending: INTERNAL MEDICINE
Payer: MEDICARE

## 2019-07-26 VITALS
SYSTOLIC BLOOD PRESSURE: 104 MMHG | BODY MASS INDEX: 26.9 KG/M2 | WEIGHT: 167.4 LBS | HEART RATE: 70 BPM | DIASTOLIC BLOOD PRESSURE: 61 MMHG | HEIGHT: 66 IN

## 2019-07-26 DIAGNOSIS — Z95.0 CARDIAC PACEMAKER IN SITU: ICD-10-CM

## 2019-07-26 DIAGNOSIS — I48.0 PAROXYSMAL A-FIB (H): Primary | ICD-10-CM

## 2019-07-26 DIAGNOSIS — E78.2 MIXED HYPERLIPIDEMIA: ICD-10-CM

## 2019-07-26 DIAGNOSIS — Z45.010 PACEMAKER BATTERY DEPLETION: ICD-10-CM

## 2019-07-26 PROCEDURE — 93000 ELECTROCARDIOGRAM COMPLETE: CPT | Performed by: PHYSICIAN ASSISTANT

## 2019-07-26 PROCEDURE — 99214 OFFICE O/P EST MOD 30 MIN: CPT | Performed by: PHYSICIAN ASSISTANT

## 2019-07-26 ASSESSMENT — MIFFLIN-ST. JEOR: SCORE: 1357.07

## 2019-07-26 NOTE — LETTER
7/26/2019    Edmundo Roblero MD  [address] [address]    RE: Balta Drake       Dear Colleague,    I had the pleasure of seeing Balta Weissunique in the [facility].    HPI:   I had the pleasure of seeing Ced when he came for preoperative evaluation for generator replacement.  This 89 year old sees Dr. Ollie Mandel for his history of:    1. SSS and pAFib, s/p dual chamber [employer] PPM 2003, with replacement 6/2009. On amiodarone 100 mg daily. AFib first noted 2003 at time of [place]PM [place] ([place]). Amiodarone first started 2006 with brief load. Amiodarone decreased to 100 mg 6/2006 and has been on this since.  2. CAD s/p OM stent (noted to be RI stent in cath report 2014) 2002. Last angiogram 2014 with  of ramus stent  3. Chronic AC for CHADSVASc 5 (CM, HTN, CAD, age). On Eliquis   4. Mild CM with EF 40-45% on echo 5/2018   5. Mild dilation of aortic root (3.8 cm) and ascending aorta (3.8 cm) on echo 5/2018      Dr. Lyon met with Ced 5/2019 following Dr. Galo's assisted. He reviewed his hx and recommended annual f/u but noted he was overdue for in-office device interrogation.    On 7/18/2019 his device was noted to have reached ELIZABETH and he was set up to meet me for pre-op eval.    On 7/23, he was in the ER for thoracic back pain, which was reproducible with palpation.    Interval History:  Notes MULLIGAN after about 50-60 yards, which has been going for a few years, he thinks. No CP or palpitations associated.     No orthopnea, PND or edema or rapid weight gain. No rest shortness of breathing or conversational breathing issues.    No dizziness/lightheadedness. No falls/faints.     Diagnostic Testing:    EKG today, which I overread, showed APVS with nonspecific STTW changes    Device interrogation 7/18/2019 showed device triggered ELIZABETH  Device interrogation 6/2018 showed 100% AP and 27%  in DDDR  bpm. Underlying SB with  1st degree AV Block. Rates 40-50s. Lead measurements stable. 31 mode switches, longest 36.5 hours    Echocardiogam 5/2018 showed EF 40-45% due to mechanical dyssynchrony, mild inferior/inferolateral wall hypokinesis. Normal RV fxn/size. Mildly dilated LA. Normal MV, TV, PV. Aortic valve sclerosis with trace AI. Mean gradient aortic valve 3.6 mmHg. Mild aortic root dilation 3.8 cm and ascending aorta 3.8 cm    Nuclear Stress Test 6/2014 showed small/moderate reversible anterolateral and lateral wall defect c/w ischemia. Moderate partially reversible inferior/inferolatearl wall c/w nontransmural infarction with moderate jax-infarct ischemia.     Angiogram 6/2014 with pCx 50% lesion, oOM2 80% and mOM2 50% lesion. Eccentric 50% oPDA lesion. RI is proximally occluded with ipsilateral collaterals. Stent has chronically occluded. Nondominant RCA    Amiodarone testing: Labs have been followed by Dr. Roblero. LFTs 5/2019 wnl. TSH 4/2019 wnl. PFTs 6/2019 with normal pulmonary function with normal diffusion capacity.     Assessment & Plan:    1. SSS with pAFib, s/p PPM x 2, now at [facility]    Had dual chamber [employer] PPM 2003 in setting of what sounds like tachybrady syndrome and generator replacement 6/2009    Now at [facility]. Feeling SOB, but nothing new within the last few months.       PLAN:    Note last EF 40-45% with evidence of mechanical dyssynchrony. Notes SOB. Will get echocardiogram prior to device generator replacemnt    EKG showed APVS at 70 bpm      We discussed the risks, benefits and indications of dual chamber pacemaker generator replacement, including but not limited to use of conscious sedation including need for a , discomfort, peripheral vessel injury, pneumothorax, cardiac puncture and/or tamponade, device malfunction and/or recall, and infection requiring explantation of the device and long term antibiotics. We also briefly discussed follow up expectations and restrictions following the procedure.  The patient voiced understanding and is willing to proceed.  A consent form will be signed by the procedural physician.      Amiodarone testing below. Blood work followed by Dr. Roblero.     EKGs with Dr. Lyon 5/2020 appt      2. CAD    S/p OM vs RI stent 2002; cath 2014 with  of that and 80% OM2 lesion (small)    EF 40-45% on echo 5/2018    On Vytorin 10/40, Toprol XL 50 mg daily    No ASA due to Eliquis use    PLAN:    Continue to follow with Dr. Roblero for lipid control    Sees Dr. Lyon 5/2020    Echo as above    3. Atrial Fibrillation    First noted at time of device implant 2003. Started amiodarone 2006 and has been maintained on 100 mg daily    Noted to have one 36 hour episode on last in-office device interrogation 6/2018      Amiodarone testing: Labs have been followed by Dr. Roblero. LFTs 5/2019 wnl. TSH 4/2019 wnl. PFTs 6/2019 wnl.    PLAN:    Updated in-office full device interrogation to assess AFib burden to assess effectiveness of amiodarone prior to device replacement    Continue Eliquis therapy    4. Dyspnea    Has been ongoing, but quite bothersome     PFTs OK, Hgb OK 7/2019 when in ER. No CP to suggest ischemia    PLAN:    Echo to assess EF and valves    Device interrogation to assess AFib burden      Carin Cross PA-C, MSPAS      Orders Placed This Encounter   Procedures     Follow-Up with Cardiologist     EKG 12-lead complete w/read - Clinics (performed today)     EKG 12-lead complete w/read - Clinics (to be scheduled)     Echocardiogram Complete     No orders of the defined types were placed in this encounter.    There are no discontinued medications.      Encounter Diagnoses   Name Primary?     Cardiac pacemaker in situ      Pacemaker battery depletion      Paroxysmal A-fib (H) Yes     Mixed hyperlipidemia        CURRENT MEDICATIONS:  Current Outpatient Medications   Medication Sig Dispense Refill     Acetaminophen (TYLENOL PO) Take by mouth every 4 hours as needed         amiodarone (PACERONE/CODARONE) 100 MG TABS tablet Take 1 tablet (100 mg) by mouth daily 90 tablet 3     apixaban ANTICOAGULANT (ELIQUIS) 5 MG tablet Take 1 tablet (5 mg) by mouth 2 times daily 180 tablet 3     celecoxib (CELEBREX) 200 MG capsule Take 1 capsule by mouth daily. 90 capsule 3     ezetimibe-simvastatin (VYTORIN) 10-40 MG per tablet Take 1 tablet by mouth At Bedtime 90 tablet 3     metoprolol succinate (TOPROL-XL) 50 MG 24 hr tablet Take 1 tablet (50 mg) by mouth daily 90 tablet 3     senna-docusate (SENOKOT-S;PERICOLACE) 8.6-50 MG per tablet Take 1-2 tablets by mouth 2 times daily Continue while taking narcotic pain medications. Hold for loose stools. 60 tablet 1     tamsulosin (FLOMAX) 0.4 MG 24 hr capsule Take 0.4 mg by mouth daily       traMADol (ULTRAM) 50 MG tablet Take 1 tablet (50 mg) by mouth every 6 hours as needed for severe pain 10 tablet 0       ALLERGIES   No Known Allergies    PAST MEDICAL HISTORY:  Past Medical History:   Diagnosis Date     A-fib (H) 2003 and 2005    cardioverted 2005     Abdominal pain 2007 and 2009    ct neg, egd nl both 2009     Anal fissure      Aortic root dilatation (H)      Ascending aorta dilatation (H)      ASCVD (arteriosclerotic cardiovascular disease) 2003    ptca, angio 6/13/14-medical mgmt     Cardiomyopathy (H)      Colonic polyp     fu colonoscopy 2009 one polyp     Diverticulitis 2005     Hyperlipidaemia      Hypertension      Myocardial infarction (H)     S/P Unspecified     Noninfectious ileitis     diverticulitis     NSVT (nonsustained ventricular tachycardia) (H)      PMR (polymyalgia rheumatica) (H) 2001     Sick sinus syndrome (H) 2003    dual chamber pacemaker implanted 2003, generator chagne 2009     Spinal stenosis      Unspecified essential hypertension        PAST SURGICAL HISTORY:  Past Surgical History:   Procedure Laterality Date     APPENDECTOMY       APPENDECTOMY       ARTHROPLASTY HIP Right 7/7/2015    Procedure: ARTHROPLASTY HIP;   Surgeon: Marah Durham MD;  Location:  OR     ARTHROSCOPY KNEE  2003     C TOTAL HIP ARTHROPLASTY  2007    left     COLONOSCOPY      polyp     ENT SURGERY      tonsillectomy     GRAFT BONE FROM ILIAC CREST  2014    Procedure: GRAFT BONE FROM ILIAC CREST;  Surgeon: Jose Francisco Anderson MD;  Location:  OR     [facility] stent       HEART CATH CORONARY ANGIOGRAM W/LV GRAM  2014    chronic occlusion ramus, significant narrowing at origin of 2nd marginal branch     HERNIA REPAIR       HERNIA REPAIR       IMPLANT PACEMAKER  3/03    6/09 generator replacement     LAMINECTOMY, FUSION LUMBAR ONE LEVEL, COMBINED  2014    Procedure: COMBINED LAMINECTOMY, FUSION LUMBAR ONE LEVEL;  Surgeon: Jose Francisco Anderson MD;  Location:  OR     lumbar disc surgery       lumbar disc surgery         FAMILY HISTORY:  Family History   Problem Relation Age of Onset     Cardiovascular Father      Chronic Obstructive Pulmonary Disease Father      Heart Disease Mother      Breast Cancer Mother      Lung Cancer Brother      Other - See Comments Brother         something heart related       SOCIAL HISTORY:  Social History     Socioeconomic History     Marital status:      Spouse name: None     Number of children: 7     Years of education: None     Highest education level: None   Occupational History     Employer: RETIRED   Social Needs     Financial resource strain: None     Food insecurity:     Worry: None     Inability: None     Transportation needs:     Medical: None     Non-medical: None   Tobacco Use     Smoking status: Former Smoker     Years: 20.00     Types: Cigarettes     Start date:      Last attempt to quit: 1980     Years since quittin.6     Smokeless tobacco: Never Used   Substance and Sexual Activity     Alcohol use: Yes     Comment: 5 drinks week     Drug use: No     Sexual activity: Not Currently   Lifestyle     Physical activity:     Days per week: None     Minutes per session: None     Stress:  "None   Relationships     Social connections:     Talks on phone: None     Gets together: None     Attends Worship service: None     Active member of club or organization: None     Attends meetings of clubs or organizations: None     Relationship status: None     Intimate partner violence:     Fear of current or ex partner: None     Emotionally abused: None     Physically abused: None     Forced sexual activity: None   Other Topics Concern      Service Not Asked     Blood Transfusions Not Asked     Caffeine Concern No     Comment: rare     Occupational Exposure Not Asked     Hobby Hazards Not Asked     Sleep Concern Yes     Comment: d/t using bathroom     Stress Concern Not Asked     Weight Concern No     Special Diet No     Back Care Not Asked     Exercise No     Comment: back pain      Bike Helmet Not Asked     Seat Belt Yes     Self-Exams Not Asked     Parent/sibling w/ CABG, MI or angioplasty before 65F 55M? Not Asked   Social History Narrative     None       Review of Systems:  Skin:  Negative     Eyes:  Negative    ENT:  Positive for hearing loss  Respiratory:  Positive for dyspnea on exertion  Cardiovascular:  Negative for;palpitations;chest pain;edema;dizziness;lightheadedness Positive for;exercise intolerance;fatigue  Gastroenterology: Negative    Genitourinary:  Positive for prostate problem;nocturia  Musculoskeletal:  Positive for joint pain;back pain;nocturnal cramping  Neurologic:  Negative    Psychiatric:  Negative    Heme/Lymph/Imm:  Negative    Endocrine:  Negative      Physical Exam:  Vitals: /61   Pulse 70   Ht 1.676 m (5' 6\")   Wt 75.9 kg (167 lb 6.4 oz)   BMI 27.02 kg/m       Constitutional:  cooperative, alert and oriented, well developed, well nourished, in no acute distress overweight      Skin:  warm and dry to the touch, no apparent skin lesions or masses noted        Head:  normocephalic, no masses or lesions        Eyes:  pupils equal and round;conjunctivae and lids " unremarkable;sclera white        ENT:  no pallor or cyanosis, dentition good hearing aide(s) present      Neck:  JVP normal;no carotid bruit        Chest:  normal breath sounds, clear to auscultation, normal A-P diameter, normal symmetry, normal respiratory excursion, no use of accessory muscles        Cardiac: regular rhythm;normal S1 and S2   S4   systolic murmur;LUSB;grade 2          Abdomen:  abdomen soft        Vascular: pulses full and equal                                      Extremities and Back:  no deformities, clubbing, cyanosis, erythema observed;no edema        Neurological:  no gross motor deficits;affect appropriate          Recent Lab Results:  LIPID RESULTS:  Lab Results   Component Value Date    CHOL 132 07/18/2018    HDL 57 07/18/2018    LDL 59 07/18/2018    TRIG 78 07/18/2018    CHOLHDLRATIO 2.6 09/25/2015       LIVER ENZYME RESULTS:  Lab Results   Component Value Date    AST 21 05/22/2018    ALT 29 05/22/2018       CBC RESULTS:  Lab Results   Component Value Date    WBC 10.3 07/23/2019    RBC 3.78 (L) 07/23/2019    HGB 13.8 07/23/2019    HCT 38.3 (L) 07/23/2019     (H) 07/23/2019    MCH 36.5 (H) 07/23/2019    MCHC 36.0 07/23/2019    RDW 12.6 07/23/2019     07/23/2019       BMP RESULTS:  Lab Results   Component Value Date     07/23/2019    POTASSIUM 3.8 07/23/2019    CHLORIDE 103 07/23/2019    CO2 26 07/23/2019    ANIONGAP 6 07/23/2019     (H) 07/23/2019    BUN 14 07/23/2019    CR 0.92 07/23/2019    GFRESTIMATED 72 07/23/2019    GFRESTBLACK 83 07/23/2019    MARAH 8.3 (L) 07/23/2019          Thank you for allowing me to participate in the care of your patient.      Sincerely,     Cora Cross PA-C     [facility]

## 2019-07-26 NOTE — PROGRESS NOTES
HPI:   I had the pleasure of seeing Ced when he came for preoperative evaluation for generator replacement.  This 89 year old sees Dr. Ollie Mandel for his history of:    1. SSS and pAFib, s/p dual chamber [employer] PPM 2003, with replacement 6/2009. On amiodarone 100 mg daily. AFib first noted 2003 at time of PPM implant ([place]). Amiodarone first started 2006 with brief load. Amiodarone decreased to 100 mg 6/2006 and has been on this since.  2. CAD s/p OM stent (noted to be RI stent in cath report 2014) 2002. Last angiogram 2014 with  of ramus stent  3. Chronic AC for CHADSVASc 5 (CM, HTN, CAD, age). On Eliquis   4. Mild CM with EF 40-45% on echo 5/2018   5. Mild dilation of aortic root (3.8 cm) and ascending aorta (3.8 cm) on echo 5/2018      Dr. Lyon met with Ced 5/2019 following Dr. Galo's half-way. He reviewed his hx and recommended annual f/u but noted he was overdue for in-office device interrogation.    On 7/18/2019 his device was noted to have reached ELIZABETH and he was set up to meet me for pre-op eval.    On 7/23, he was in the ER for thoracic back pain, which was reproducible with palpation.    Interval History:  Notes MULLIGAN after about 50-60 yards, which has been going for a few years, he thinks. No CP or palpitations associated.     No orthopnea, PND or edema or rapid weight gain. No rest shortness of breathing or conversational breathing issues.    No dizziness/lightheadedness. No falls/faints.     Diagnostic Testing:    EKG today, which I overread, showed APVS with nonspecific STTW changes    Device interrogation 7/18/2019 showed device triggered ELIZABETH  Device interrogation 6/2018 showed 100% AP and 27%  in DDDR  bpm. Underlying SB with 1st degree AV Block. Rates 40-50s. Lead measurements stable. 31 mode switches, longest 36.5 hours    Echocardiogam 5/2018 showed EF 40-45% due to mechanical dyssynchrony, mild inferior/inferolateral wall hypokinesis. Normal RV fxn/size. Mildly  dilated LA. Normal MV, TV, PV. Aortic valve sclerosis with trace AI. Mean gradient aortic valve 3.6 mmHg. Mild aortic root dilation 3.8 cm and ascending aorta 3.8 cm    Nuclear Stress Test 6/2014 showed small/moderate reversible anterolateral and lateral wall defect c/w ischemia. Moderate partially reversible inferior/inferolatearl wall c/w nontransmural infarction with moderate jax-infarct ischemia.     Angiogram 6/2014 with pCx 50% lesion, oOM2 80% and mOM2 50% lesion. Eccentric 50% oPDA lesion. RI is proximally occluded with ipsilateral collaterals. Stent has chronically occluded. Nondominant RCA    Amiodarone testing: Labs have been followed by Dr. Roblero. LFTs 5/2019 wnl. TSH 4/2019 wnl. PFTs 6/2019 with normal pulmonary function with normal diffusion capacity.     Assessment & Plan:    1. SSS with pAFib, s/p PPM x 2, now at [facility]    Had dual chamber [employer] PPM 2003 in setting of what sounds like tachybrady syndrome and generator replacement 6/2009    Now at [facility]. Feeling SOB, but nothing new within the last few months.       PLAN:    Note last EF 40-45% with evidence of mechanical dyssynchrony. Notes SOB. Will get echocardiogram prior to device generator replacemnt    EKG showed APVS at 70 bpm      We discussed the risks, benefits and indications of dual chamber pacemaker generator replacement, including but not limited to use of conscious sedation including need for a , discomfort, peripheral vessel injury, pneumothorax, cardiac puncture and/or tamponade, device malfunction and/or recall, and infection requiring explantation of the device and long term antibiotics. We also briefly discussed follow up expectations and restrictions following the procedure. The patient voiced understanding and is willing to proceed.  A consent form will be signed by the procedural physician.      Amiodarone testing below. Blood work followed by Dr. Roblero.     EKGs with Dr. Lyon 5/2020 appt      2.  CAD    S/p OM vs RI stent 2002; cath 2014 with  of that and 80% OM2 lesion (small)    EF 40-45% on echo 5/2018    On Vytorin 10/40, Toprol XL 50 mg daily    No ASA due to Eliquis use    PLAN:    Continue to follow with Dr. Roblero for lipid control    Sees Dr. Lyon 5/2020    Echo as above    3. Atrial Fibrillation    First noted at time of device implant 2003. Started amiodarone 2006 and has been maintained on 100 mg daily    Noted to have one 36 hour episode on last in-office device interrogation 6/2018      Amiodarone testing: Labs have been followed by Dr. Roblero. LFTs 5/2019 wnl. TSH 4/2019 wnl. PFTs 6/2019 wnl.    PLAN:    Updated in-office full device interrogation to assess AFib burden to assess effectiveness of amiodarone prior to device replacement    Continue Eliquis therapy    4. Dyspnea    Has been ongoing, but quite bothersome     PFTs OK, Hgb OK 7/2019 when in ER. No CP to suggest ischemia    PLAN:    Echo to assess EF and valves    Device interrogation to assess AFib burden      Carin Cross PA-C, MSPAS      Orders Placed This Encounter   Procedures     Follow-Up with Cardiologist     EKG 12-lead complete w/read - Clinics (performed today)     EKG 12-lead complete w/read - Clinics (to be scheduled)     Echocardiogram Complete     No orders of the defined types were placed in this encounter.    There are no discontinued medications.      Encounter Diagnoses   Name Primary?     Cardiac pacemaker in situ      Pacemaker battery depletion      Paroxysmal A-fib (H) Yes     Mixed hyperlipidemia        CURRENT MEDICATIONS:  Current Outpatient Medications   Medication Sig Dispense Refill     Acetaminophen (TYLENOL PO) Take by mouth every 4 hours as needed        amiodarone (PACERONE/CODARONE) 100 MG TABS tablet Take 1 tablet (100 mg) by mouth daily 90 tablet 3     apixaban ANTICOAGULANT (ELIQUIS) 5 MG tablet Take 1 tablet (5 mg) by mouth 2 times daily 180 tablet 3     celecoxib (CELEBREX) 200 MG  capsule Take 1 capsule by mouth daily. 90 capsule 3     ezetimibe-simvastatin (VYTORIN) 10-40 MG per tablet Take 1 tablet by mouth At Bedtime 90 tablet 3     metoprolol succinate (TOPROL-XL) 50 MG 24 hr tablet Take 1 tablet (50 mg) by mouth daily 90 tablet 3     senna-docusate (SENOKOT-S;PERICOLACE) 8.6-50 MG per tablet Take 1-2 tablets by mouth 2 times daily Continue while taking narcotic pain medications. Hold for loose stools. 60 tablet 1     tamsulosin (FLOMAX) 0.4 MG 24 hr capsule Take 0.4 mg by mouth daily       traMADol (ULTRAM) 50 MG tablet Take 1 tablet (50 mg) by mouth every 6 hours as needed for severe pain 10 tablet 0       ALLERGIES   No Known Allergies    PAST MEDICAL HISTORY:  Past Medical History:   Diagnosis Date     A-fib (H) 2003 and 2005    cardioverted 2005     Abdominal pain 2007 and 2009    ct neg, egd nl both 2009     Anal fissure      Aortic root dilatation (H)      Ascending aorta dilatation (H)      ASCVD (arteriosclerotic cardiovascular disease) 2003    ptca, angio 6/13/14-medical mgmt     Cardiomyopathy (H)      Colonic polyp     fu colonoscopy 2009 one polyp     Diverticulitis 2005     Hyperlipidaemia      Hypertension      Myocardial infarction (H)     S/P Unspecified     Noninfectious ileitis     diverticulitis     NSVT (nonsustained ventricular tachycardia) (H)      PMR (polymyalgia rheumatica) (H) 2001     Sick sinus syndrome (H) 2003    dual chamber pacemaker implanted 2003, generator chagne 2009     Spinal stenosis      Unspecified essential hypertension        PAST SURGICAL HISTORY:  Past Surgical History:   Procedure Laterality Date     APPENDECTOMY       APPENDECTOMY       ARTHROPLASTY HIP Right 7/7/2015    Procedure: ARTHROPLASTY HIP;  Surgeon: Marah Durham MD;  Location: [facility] OR     ARTHROSCOPY KNEE  2003     C TOTAL HIP ARTHROPLASTY  2007    left     COLONOSCOPY      polyp     ENT SURGERY      tonsillectomy     GRAFT BONE FROM ILIAC CREST  7/9/2014    Procedure: GRAFT  BONE FROM ILIAC CREST;  Surgeon: Jose Francisco Anderson MD;  Location:  OR     hchg stent       HEART CATH CORONARY ANGIOGRAM W/LV GRAM  2014    chronic occlusion ramus, significant narrowing at origin of 2nd marginal branch     HERNIA REPAIR       HERNIA REPAIR       IMPLANT PACEMAKER  3/03    6/09 generator replacement     LAMINECTOMY, FUSION LUMBAR ONE LEVEL, COMBINED  2014    Procedure: COMBINED LAMINECTOMY, FUSION LUMBAR ONE LEVEL;  Surgeon: Jose Francisco Anderson MD;  Location: [facility] OR     lumbar disc surgery       lumbar disc surgery         FAMILY HISTORY:  Family History   Problem Relation Age of Onset     Cardiovascular Father      Chronic Obstructive Pulmonary Disease Father      Heart Disease Mother      Breast Cancer Mother      Lung Cancer Brother      Other - See Comments Brother         something heart related       SOCIAL HISTORY:  Social History     Socioeconomic History     Marital status:      Spouse name: None     Number of children: 7     Years of education: None     Highest education level: None   Occupational History     Employer: RETIRED   Social Needs     Financial resource strain: None     Food insecurity:     Worry: None     Inability: None     Transportation needs:     Medical: None     Non-medical: None   Tobacco Use     Smoking status: Former Smoker     Years: 20.00     Types: Cigarettes     Start date:      Last attempt to quit: 1980     Years since quittin.6     Smokeless tobacco: Never Used   Substance and Sexual Activity     Alcohol use: Yes     Comment: 5 drinks week     Drug use: No     Sexual activity: Not Currently   Lifestyle     Physical activity:     Days per week: None     Minutes per session: None     Stress: None   Relationships     Social connections:     Talks on phone: None     Gets together: None     Attends Alevism service: None     Active member of club or organization: None     Attends meetings of clubs or organizations: None      "Relationship status: None     Intimate partner violence:     Fear of current or ex partner: None     Emotionally abused: None     Physically abused: None     Forced sexual activity: None   Other Topics Concern      Service Not Asked     Blood Transfusions Not Asked     Caffeine Concern No     Comment: rare     Occupational Exposure Not Asked     Hobby Hazards Not Asked     Sleep Concern Yes     Comment: d/t using bathroom     Stress Concern Not Asked     Weight Concern No     Special Diet No     Back Care Not Asked     Exercise No     Comment: back pain      Bike Helmet Not Asked     Seat Belt Yes     Self-Exams Not Asked     Parent/sibling w/ CABG, MI or angioplasty before 65F 55M? Not Asked   Social History Narrative     None       Review of Systems:  Skin:  Negative     Eyes:  Negative    ENT:  Positive for hearing loss  Respiratory:  Positive for dyspnea on exertion  Cardiovascular:  Negative for;palpitations;chest pain;edema;dizziness;lightheadedness Positive for;exercise intolerance;fatigue  Gastroenterology: Negative    Genitourinary:  Positive for prostate problem;nocturia  Musculoskeletal:  Positive for joint pain;back pain;nocturnal cramping  Neurologic:  Negative    Psychiatric:  Negative    Heme/Lymph/Imm:  Negative    Endocrine:  Negative      Physical Exam:  Vitals: /61   Pulse 70   Ht 1.676 m (5' 6\")   Wt 75.9 kg (167 lb 6.4 oz)   BMI 27.02 kg/m      Constitutional:  cooperative, alert and oriented, well developed, well nourished, in no acute distress overweight      Skin:  warm and dry to the touch, no apparent skin lesions or masses noted        Head:  normocephalic, no masses or lesions        Eyes:  pupils equal and round;conjunctivae and lids unremarkable;sclera white        ENT:  no pallor or cyanosis, dentition good hearing aide(s) present      Neck:  JVP normal;no carotid bruit        Chest:  normal breath sounds, clear to auscultation, normal A-P diameter, normal symmetry, " normal respiratory excursion, no use of accessory muscles        Cardiac: regular rhythm;normal S1 and S2   S4   systolic murmur;LUSB;grade 2          Abdomen:  abdomen soft        Vascular: pulses full and equal                                      Extremities and Back:  no deformities, clubbing, cyanosis, erythema observed;no edema        Neurological:  no gross motor deficits;affect appropriate          Recent Lab Results:  LIPID RESULTS:  Lab Results   Component Value Date    CHOL 132 07/18/2018    HDL 57 07/18/2018    LDL 59 07/18/2018    TRIG 78 07/18/2018    CHOLHDLRATIO 2.6 09/25/2015       LIVER ENZYME RESULTS:  Lab Results   Component Value Date    AST 21 05/22/2018    ALT 29 05/22/2018       CBC RESULTS:  Lab Results   Component Value Date    WBC 10.3 07/23/2019    RBC 3.78 (L) 07/23/2019    HGB 13.8 07/23/2019    HCT 38.3 (L) 07/23/2019     (H) 07/23/2019    MCH 36.5 (H) 07/23/2019    MCHC 36.0 07/23/2019    RDW 12.6 07/23/2019     07/23/2019       BMP RESULTS:  Lab Results   Component Value Date     07/23/2019    POTASSIUM 3.8 07/23/2019    CHLORIDE 103 07/23/2019    CO2 26 07/23/2019    ANIONGAP 6 07/23/2019     (H) 07/23/2019    BUN 14 07/23/2019    CR 0.92 07/23/2019    GFRESTIMATED 72 07/23/2019    GFRESTBLACK 83 07/23/2019    MARAH 8.3 (L) 07/23/2019

## 2019-07-26 NOTE — PATIENT INSTRUCTIONS
"1. Reviewed your shortness of breath with exerting. Seems pretty stable for the last few years, but \"bad.\"   Reviewed your lung tests 6/2019 showing good lung function   Blood work in ER showed your weren't anemic   May be due to weaker heart muscle??  Will get new echocardiogram.   May be due to more atrial fibrillation?  Will get pacemaker check     2. Reviewed replacement of battery for [employer] pacemaker.      3. Nurses' #: [phone]  "

## 2019-08-08 RX ORDER — CEFAZOLIN SODIUM 2 G/100ML
2 INJECTION, SOLUTION INTRAVENOUS
Status: CANCELLED | OUTPATIENT
Start: 2019-08-08

## 2019-08-08 RX ORDER — SODIUM CHLORIDE 450 MG/100ML
INJECTION, SOLUTION INTRAVENOUS CONTINUOUS
Status: CANCELLED | OUTPATIENT
Start: 2019-08-08

## 2019-08-08 RX ORDER — LIDOCAINE 40 MG/G
CREAM TOPICAL
Status: CANCELLED | OUTPATIENT
Start: 2019-08-08

## 2019-08-09 ENCOUNTER — DOCUMENTATION ONLY (OUTPATIENT)
Dept: CARDIOLOGY | Facility: CLINIC | Age: 84
End: 2019-08-09

## 2019-08-09 ENCOUNTER — ANCILLARY PROCEDURE (OUTPATIENT)
Dept: CARDIOLOGY | Facility: CLINIC | Age: 84
End: 2019-08-09
Attending: PHYSICIAN ASSISTANT
Payer: MEDICARE

## 2019-08-09 ENCOUNTER — HOSPITAL ENCOUNTER (OUTPATIENT)
Dept: CARDIOLOGY | Facility: CLINIC | Age: 84
Discharge: HOME | End: 2019-08-09
Attending: PHYSICIAN ASSISTANT | Admitting: PHYSICIAN ASSISTANT
Payer: MEDICARE

## 2019-08-09 DIAGNOSIS — I48.0 PAROXYSMAL A-FIB (H): ICD-10-CM

## 2019-08-09 DIAGNOSIS — Z45.010 PACEMAKER BATTERY DEPLETION: ICD-10-CM

## 2019-08-09 DIAGNOSIS — Z95.0 CARDIAC PACEMAKER IN SITU: ICD-10-CM

## 2019-08-09 PROCEDURE — 93280 PM DEVICE PROGR EVAL DUAL: CPT | Performed by: INTERNAL MEDICINE

## 2019-08-09 PROCEDURE — 93306 TTE W/DOPPLER COMPLETE: CPT | Mod: 26 | Performed by: INTERNAL MEDICINE

## 2019-08-09 PROCEDURE — 93306 TTE W/DOPPLER COMPLETE: CPT

## 2019-08-09 NOTE — TELEPHONE ENCOUNTER
Spoke with patient and his daughter, Cassie, regarding pre-procedure instructions for device implant after his device check. Instructions as follows:     Anticoagulation: Pt instructed to hold Eliquis for 3 days prior (8/10, 8/11, 8/12)   Oral diabetes meds: N/A  Insulin: N/A  Diuretic: N/A  Contrast allergy: None    Pt informed to be NPO at midnight.    Instructed pt to shower the morning of the procedure, and then put on a clean shirt in order to help prevent infection.     Pt has post-procedure transportation and 24 hours monitoring set up.   Pt aware of no driving for 24 hours post procedure due to sedation.     Pt aware of arrival time of 0830 and location. Pt verbalized understanding of instructions.     SIVA Van

## 2019-08-11 ENCOUNTER — DOCUMENTATION ONLY (OUTPATIENT)
Dept: CARDIOLOGY | Facility: CLINIC | Age: 84
End: 2019-08-11

## 2019-08-12 LAB
MDC_IDC_LEAD_IMPLANT_DT: NORMAL
MDC_IDC_LEAD_LOCATION: NORMAL
MDC_IDC_LEAD_MFG: NORMAL
MDC_IDC_LEAD_MODEL: NORMAL
MDC_IDC_LEAD_POLARITY_TYPE: NORMAL
MDC_IDC_LEAD_SERIAL: NORMAL
MDC_IDC_LEAD_SPECIAL_FUNCTION: NORMAL
MDC_IDC_MSMT_BATTERY_STATUS: NORMAL
MDC_IDC_MSMT_LEADCHNL_RA_IMPEDANCE_VALUE: 620 OHM
MDC_IDC_MSMT_LEADCHNL_RA_IMPEDANCE_VALUE: 620 OHM
MDC_IDC_MSMT_LEADCHNL_RA_PACING_THRESHOLD_AMPLITUDE: 2 V
MDC_IDC_MSMT_LEADCHNL_RA_PACING_THRESHOLD_AMPLITUDE: 2.5 V
MDC_IDC_MSMT_LEADCHNL_RA_PACING_THRESHOLD_PULSEWIDTH: 0.4 MS
MDC_IDC_MSMT_LEADCHNL_RA_PACING_THRESHOLD_PULSEWIDTH: 0.4 MS
MDC_IDC_MSMT_LEADCHNL_RA_SENSING_INTR_AMPL: 1.4 MV
MDC_IDC_MSMT_LEADCHNL_RV_IMPEDANCE_VALUE: 600 OHM
MDC_IDC_MSMT_LEADCHNL_RV_IMPEDANCE_VALUE: 600 OHM
MDC_IDC_MSMT_LEADCHNL_RV_SENSING_INTR_AMPL: 5.1 MV
MDC_IDC_PG_IMPLANT_DTM: NORMAL
MDC_IDC_PG_IMPLANT_DTM: NORMAL
MDC_IDC_PG_MFG: NORMAL
MDC_IDC_PG_MFG: NORMAL
MDC_IDC_PG_MODEL: NORMAL
MDC_IDC_PG_MODEL: NORMAL
MDC_IDC_PG_SERIAL: NORMAL
MDC_IDC_PG_SERIAL: NORMAL
MDC_IDC_PG_TYPE: NORMAL
MDC_IDC_PG_TYPE: NORMAL
MDC_IDC_SESS_CLINIC_NAME: NORMAL
MDC_IDC_SESS_CLINIC_NAME: NORMAL
MDC_IDC_SESS_DTM: NORMAL
MDC_IDC_SESS_DTM: NORMAL
MDC_IDC_SESS_TYPE: NORMAL
MDC_IDC_SESS_TYPE: NORMAL
MDC_IDC_SET_BRADY_AT_MODE_SWITCH_MODE: NORMAL
MDC_IDC_SET_BRADY_AT_MODE_SWITCH_RATE: 170 {BEATS}/MIN
MDC_IDC_SET_BRADY_HYSTRATE: NORMAL
MDC_IDC_SET_BRADY_LOWRATE: 70 {BEATS}/MIN
MDC_IDC_SET_BRADY_MAX_SENSOR_RATE: 130 {BEATS}/MIN
MDC_IDC_SET_BRADY_MAX_TRACKING_RATE: 130 {BEATS}/MIN
MDC_IDC_SET_BRADY_MODE: NORMAL
MDC_IDC_SET_BRADY_PAV_DELAY_HIGH: 220 MS
MDC_IDC_SET_BRADY_PAV_DELAY_LOW: 400 MS
MDC_IDC_SET_BRADY_SAV_DELAY_HIGH: 220 MS
MDC_IDC_SET_BRADY_SAV_DELAY_LOW: 400 MS
MDC_IDC_SET_LEADCHNL_RA_PACING_AMPLITUDE: 2.6 V
MDC_IDC_SET_LEADCHNL_RA_PACING_ANODE_ELECTRODE_1: NORMAL
MDC_IDC_SET_LEADCHNL_RA_PACING_ANODE_LOCATION_1: NORMAL
MDC_IDC_SET_LEADCHNL_RA_PACING_CAPTURE_MODE: NORMAL
MDC_IDC_SET_LEADCHNL_RA_PACING_CATHODE_ELECTRODE_1: NORMAL
MDC_IDC_SET_LEADCHNL_RA_PACING_CATHODE_LOCATION_1: NORMAL
MDC_IDC_SET_LEADCHNL_RA_PACING_POLARITY: NORMAL
MDC_IDC_SET_LEADCHNL_RA_PACING_PULSEWIDTH: 0.4 MS
MDC_IDC_SET_LEADCHNL_RA_SENSING_ADAPTATION_MODE: NORMAL
MDC_IDC_SET_LEADCHNL_RA_SENSING_ANODE_ELECTRODE_1: NORMAL
MDC_IDC_SET_LEADCHNL_RA_SENSING_ANODE_LOCATION_1: NORMAL
MDC_IDC_SET_LEADCHNL_RA_SENSING_CATHODE_ELECTRODE_1: NORMAL
MDC_IDC_SET_LEADCHNL_RA_SENSING_CATHODE_LOCATION_1: NORMAL
MDC_IDC_SET_LEADCHNL_RA_SENSING_POLARITY: NORMAL
MDC_IDC_SET_LEADCHNL_RA_SENSING_SENSITIVITY: 0.25 MV
MDC_IDC_SET_LEADCHNL_RV_PACING_AMPLITUDE: 3.8 V
MDC_IDC_SET_LEADCHNL_RV_PACING_ANODE_ELECTRODE_1: NORMAL
MDC_IDC_SET_LEADCHNL_RV_PACING_ANODE_LOCATION_1: NORMAL
MDC_IDC_SET_LEADCHNL_RV_PACING_CAPTURE_MODE: NORMAL
MDC_IDC_SET_LEADCHNL_RV_PACING_CATHODE_ELECTRODE_1: NORMAL
MDC_IDC_SET_LEADCHNL_RV_PACING_CATHODE_LOCATION_1: NORMAL
MDC_IDC_SET_LEADCHNL_RV_PACING_POLARITY: NORMAL
MDC_IDC_SET_LEADCHNL_RV_PACING_PULSEWIDTH: 0.4 MS
MDC_IDC_SET_LEADCHNL_RV_SENSING_ADAPTATION_MODE: NORMAL
MDC_IDC_SET_LEADCHNL_RV_SENSING_ANODE_ELECTRODE_1: NORMAL
MDC_IDC_SET_LEADCHNL_RV_SENSING_ANODE_LOCATION_1: NORMAL
MDC_IDC_SET_LEADCHNL_RV_SENSING_CATHODE_ELECTRODE_1: NORMAL
MDC_IDC_SET_LEADCHNL_RV_SENSING_CATHODE_LOCATION_1: NORMAL
MDC_IDC_SET_LEADCHNL_RV_SENSING_POLARITY: NORMAL
MDC_IDC_SET_LEADCHNL_RV_SENSING_SENSITIVITY: 2.5 MV
MDC_IDC_STAT_AT_DTM_END: NORMAL
MDC_IDC_STAT_AT_DTM_START: NORMAL
MDC_IDC_STAT_AT_MODE_SW_COUNT: 1
MDC_IDC_STAT_AT_MODE_SW_MAX_DURATION: 9720 S
MDC_IDC_STAT_BRADY_DTM_END: NORMAL
MDC_IDC_STAT_BRADY_DTM_START: NORMAL
MDC_IDC_STAT_BRADY_RA_PERCENT_PACED: 95 %
MDC_IDC_STAT_BRADY_RV_PERCENT_PACED: 60 %
MDC_IDC_STAT_EPISODE_RECENT_COUNT: 1
MDC_IDC_STAT_EPISODE_RECENT_COUNT_DTM_END: NORMAL
MDC_IDC_STAT_EPISODE_RECENT_COUNT_DTM_START: NORMAL
MDC_IDC_STAT_EPISODE_TYPE: NORMAL

## 2019-08-13 ENCOUNTER — HOSPITAL ENCOUNTER (OUTPATIENT)
Facility: CLINIC | Age: 84
Discharge: HOME | End: 2019-08-13
Admitting: INTERNAL MEDICINE
Payer: MEDICARE

## 2019-08-13 ENCOUNTER — SURGERY (OUTPATIENT)
Age: 84
End: 2019-08-13
Payer: MEDICARE

## 2019-08-13 VITALS
SYSTOLIC BLOOD PRESSURE: 96 MMHG | TEMPERATURE: 96.6 F | WEIGHT: 167.4 LBS | HEART RATE: 96 BPM | BODY MASS INDEX: 26.9 KG/M2 | RESPIRATION RATE: 14 BRPM | OXYGEN SATURATION: 98 % | DIASTOLIC BLOOD PRESSURE: 67 MMHG | HEIGHT: 66 IN

## 2019-08-13 DIAGNOSIS — Z95.0 CARDIAC PACEMAKER IN SITU: ICD-10-CM

## 2019-08-13 DIAGNOSIS — Z45.010 PACEMAKER BATTERY DEPLETION: ICD-10-CM

## 2019-08-13 LAB
ANION GAP SERPL CALCULATED.3IONS-SCNC: 5 MMOL/L (ref 3–14)
BUN SERPL-MCNC: 15 MG/DL (ref 7–30)
CALCIUM SERPL-MCNC: 8.8 MG/DL (ref 8.5–10.1)
CHLORIDE SERPL-SCNC: 107 MMOL/L (ref 94–109)
CO2 SERPL-SCNC: 27 MMOL/L (ref 20–32)
CREAT SERPL-MCNC: 1.01 MG/DL (ref 0.66–1.25)
ERYTHROCYTE [DISTWIDTH] IN BLOOD BY AUTOMATED COUNT: 12.8 % (ref 10–15)
GFR SERPL CREATININE-BSD FRML MDRD: 64 ML/MIN/{1.73_M2}
GLUCOSE SERPL-MCNC: 91 MG/DL (ref 70–99)
HCT VFR BLD AUTO: 37.8 % (ref 40–53)
HGB BLD-MCNC: 13.3 G/DL (ref 13.3–17.7)
MCH RBC QN AUTO: 35.4 PG (ref 26.5–33)
MCHC RBC AUTO-ENTMCNC: 35.2 G/DL (ref 31.5–36.5)
MCV RBC AUTO: 101 FL (ref 78–100)
PLATELET # BLD AUTO: 191 10E9/L (ref 150–450)
POTASSIUM SERPL-SCNC: 4.4 MMOL/L (ref 3.4–5.3)
RBC # BLD AUTO: 3.76 10E12/L (ref 4.4–5.9)
SODIUM SERPL-SCNC: 139 MMOL/L (ref 133–144)
WBC # BLD AUTO: 7.3 10E9/L (ref 4–11)

## 2019-08-13 PROCEDURE — 33228 REMV&REPLC PM GEN DUAL LEAD: CPT | Performed by: INTERNAL MEDICINE

## 2019-08-13 PROCEDURE — 99152 MOD SED SAME PHYS/QHP 5/>YRS: CPT | Performed by: INTERNAL MEDICINE

## 2019-08-13 PROCEDURE — 25800029 ZZH RX IP 258 OP 250: Performed by: INTERNAL MEDICINE

## 2019-08-13 PROCEDURE — 99153 MOD SED SAME PHYS/QHP EA: CPT | Performed by: INTERNAL MEDICINE

## 2019-08-13 PROCEDURE — 93010 ELECTROCARDIOGRAM REPORT: CPT | Performed by: INTERNAL MEDICINE

## 2019-08-13 PROCEDURE — 85027 COMPLETE CBC AUTOMATED: CPT

## 2019-08-13 PROCEDURE — 80048 BASIC METABOLIC PNL TOTAL CA: CPT

## 2019-08-13 PROCEDURE — 40000852 ZZH STATISTIC HEART CATH LAB OR EP LAB

## 2019-08-13 PROCEDURE — 36591 DRAW BLOOD OFF VENOUS DEVICE: CPT

## 2019-08-13 PROCEDURE — 25000125 ZZHC RX 250: Performed by: INTERNAL MEDICINE

## 2019-08-13 PROCEDURE — C1785 PMKR, DUAL, RATE-RESP: HCPCS | Performed by: INTERNAL MEDICINE

## 2019-08-13 PROCEDURE — 25000128 H RX IP 250 OP 636: Performed by: INTERNAL MEDICINE

## 2019-08-13 PROCEDURE — 40000065 ZZH STATISTIC EKG NON-CHARGEABLE

## 2019-08-13 PROCEDURE — 93005 ELECTROCARDIOGRAM TRACING: CPT

## 2019-08-13 PROCEDURE — 27210794 ZZH OR GENERAL SUPPLY STERILE: Performed by: INTERNAL MEDICINE

## 2019-08-13 DEVICE — PCMKR CARD ACCOLADE MRI EL DR: Type: IMPLANTABLE DEVICE | Status: FUNCTIONAL

## 2019-08-13 RX ORDER — OXYCODONE AND ACETAMINOPHEN 5; 325 MG/1; MG/1
1 TABLET ORAL EVERY 4 HOURS PRN
Status: DISCONTINUED | OUTPATIENT
Start: 2019-08-13 | End: 2019-08-13 | Stop reason: HOSPADM

## 2019-08-13 RX ORDER — FENTANYL CITRATE 50 UG/ML
INJECTION, SOLUTION INTRAMUSCULAR; INTRAVENOUS
Status: DISCONTINUED | OUTPATIENT
Start: 2019-08-13 | End: 2019-08-13 | Stop reason: HOSPADM

## 2019-08-13 RX ORDER — BUPIVACAINE HYDROCHLORIDE 2.5 MG/ML
INJECTION, SOLUTION EPIDURAL; INFILTRATION; INTRACAUDAL
Status: DISCONTINUED | OUTPATIENT
Start: 2019-08-13 | End: 2019-08-13 | Stop reason: HOSPADM

## 2019-08-13 RX ORDER — SODIUM CHLORIDE 450 MG/100ML
INJECTION, SOLUTION INTRAVENOUS CONTINUOUS
Status: DISCONTINUED | OUTPATIENT
Start: 2019-08-13 | End: 2019-08-13 | Stop reason: HOSPADM

## 2019-08-13 RX ORDER — NALOXONE HYDROCHLORIDE 0.4 MG/ML
.1-.4 INJECTION, SOLUTION INTRAMUSCULAR; INTRAVENOUS; SUBCUTANEOUS
Status: DISCONTINUED | OUTPATIENT
Start: 2019-08-13 | End: 2019-08-13 | Stop reason: HOSPADM

## 2019-08-13 RX ORDER — LIDOCAINE 40 MG/G
CREAM TOPICAL
Status: DISCONTINUED | OUTPATIENT
Start: 2019-08-13 | End: 2019-08-13 | Stop reason: HOSPADM

## 2019-08-13 RX ORDER — CEFAZOLIN SODIUM 2 G/100ML
2 INJECTION, SOLUTION INTRAVENOUS
Status: COMPLETED | OUTPATIENT
Start: 2019-08-13 | End: 2019-08-13

## 2019-08-13 RX ADMIN — BACITRACIN 25000 UNITS: 5000 INJECTION, POWDER, FOR SOLUTION INTRAMUSCULAR at 10:44

## 2019-08-13 RX ADMIN — CEFAZOLIN SODIUM 2 G: 2 INJECTION, SOLUTION INTRAVENOUS at 10:06

## 2019-08-13 RX ADMIN — SODIUM CHLORIDE: 4.5 INJECTION, SOLUTION INTRAVENOUS at 09:06

## 2019-08-13 RX ADMIN — MIDAZOLAM 0.5 MG: 1 INJECTION INTRAMUSCULAR; INTRAVENOUS at 10:26

## 2019-08-13 RX ADMIN — FENTANYL CITRATE 25 MCG: 50 INJECTION, SOLUTION INTRAMUSCULAR; INTRAVENOUS at 10:29

## 2019-08-13 RX ADMIN — BUPIVACAINE HYDROCHLORIDE 10 ML: 2.5 INJECTION, SOLUTION EPIDURAL; INFILTRATION; INTRACAUDAL at 10:36

## 2019-08-13 RX ADMIN — FENTANYL CITRATE 25 MCG: 50 INJECTION, SOLUTION INTRAMUSCULAR; INTRAVENOUS at 10:26

## 2019-08-13 RX ADMIN — MIDAZOLAM 0.5 MG: 1 INJECTION INTRAMUSCULAR; INTRAVENOUS at 10:29

## 2019-08-13 RX ADMIN — LIDOCAINE HYDROCHLORIDE 10 ML: 10 INJECTION, SOLUTION EPIDURAL; INFILTRATION; INTRACAUDAL; PERINEURAL at 10:36

## 2019-08-13 ASSESSMENT — MIFFLIN-ST. JEOR: SCORE: 1357.07

## 2019-08-13 NOTE — PRE-PROCEDURE
GENERAL PRE-PROCEDURE:   Procedure:  Pacemaker replacement  Date/Time:  8/13/2019 10:03 AM    Verbal consent obtained?: Yes    Written consent obtained?: Yes    Risks and benefits: Risks, benefits and alternatives were discussed    Consent given by:  Patient  Patient states understanding of procedure being performed: Yes    Patient's understanding of procedure matches consent: Yes    Procedure consent matches procedure scheduled: Yes    Expected level of sedation:  Minimal  Appropriately NPO:  Yes  ASA Class:  Class 3- Severe systemic disease, definite functional limitations  Mallampati  :  Grade 1- soft palate, uvula, tonsillar pillars, and posterior pharyngeal wall visible  Lungs:  Lungs clear with good breath sounds bilaterally  Heart:  Normal heart sounds and rate  History & Physical reviewed:  History and physical reviewed and no updates needed  Statement of review:  I have reviewed the lab findings, diagnostic data, medications, and the plan for sedation

## 2019-08-13 NOTE — PROGRESS NOTES
Dual chamber replacement.  No complications.  May be discharged around 12:00 noon.  Resume home medications.  Continue cardiology follow up with Dr. Lyon.

## 2019-08-13 NOTE — PROGRESS NOTES
Returned from EP lab ~ 1110. Dressing to left chest CDI, pressure dressing CDI. VSS. Denies pain. Son at bedside. Pt tolerated box lunch. Device checked per pacer rep. Booklet and ID card to pt along with bedside monitoring device. Will discharge when pt is done eating.

## 2019-08-13 NOTE — DISCHARGE INSTRUCTIONS
Generator Change Discharge Instructions    After you go home:      Have an adult stay with you until tomorrow.    You may resume your normal diet.       For 24 hours - due to the sedation you received:    Relax and take it easy.    Do NOT make any important or legal decisions.    Do NOT drive or operate machines at home or at work.    Do NOT drink alcohol.    Care of Chest Incision:      Keep the bandage on at least 3 days. You may remove the dressing on 8/16/2019. Change it only if it gets loose or soaked. If you need to change it, use 4x4-inch gauze and a large clear bandage.     If there is a pressure dressing (gauze & tape) - 24 hours after your procedure you may remove ONLY the top dressing. Leave the bottom dressing on.    Leave the strips of tape on. They will fall off on their own, or we will remove them at your first check-up.    Check your wound daily for signs of infection, such as increased redness, severe swelling or draining. Fever may also be a sign of infection. Call us if you see any of these signs.    If there are no signs of infection, you may shower after the bandage comes off in 3 days. If you take a tub bath, keep the wound dry.    No soaking the incision (swimming pool, bathtub, hot tub) for 2 weeks.    You may have mild to medium pain for 3 to 5 days. Take Acetaminophen (Tylenol) or Ibuprofen (Advil) for the pain. If the pain persists or is severe, call us.    Activity:      You can begin to use your arm as it feels comfortable to you.    No driving for one day & limit to necessary driving for one week.    Bleeding:      If you start bleeding from the incision site, sit down and press firmly on the site for 10 minutes.     Once bleeding stops, call [facility] as soon as you can.       Call 911 right away if you have heavy bleeding or bleeding that does not stop.      Medicines:      Take your medications, including blood thinners, unless your provider tells you not to.    If you have  stopped any other medicines, check with your provider about when to restart them.    Follow Up Appointments:      Follow up with Device Clinic at [facility] of patient preference in 7-10 days.    Call the clinic if:      You have a large or growing hard lump around the site.    The site is red, swollen, hot or tender.    Blood or fluid is draining from the site.    You have chills or a fever greater than 101 F (38 C).    You feel dizzy or light-headed.    Questions or concerns.      Telling others about your device:      Before you leave the hospital, you will receive a temporary ID card. A permanent card will be mailed to you about 6 to 8 weeks later. Always carry the ID card with you. It has important details about your device.    You may also get a Medical Alert bracelet or tag that says you have a pacemaker or a defibrillator (ICD).  Go to [email or web address].     Always tell doctors, dentists and other care providers that you have a device implanted in you.    Let us know before you plan any surgeries. Your care team must take special steps to keep you safe during certain procedures. These steps will depend on the type of device you have. Your provider will need to see your ID card. They may need to call us for instructions.    Device Safety:      Please refer to device  s booklet for further information.        [facility] [place]:    [phone] [facility] (7 days a week)

## 2019-08-13 NOTE — PROGRESS NOTES
Late entry. Here with son for generator change. NPO, NKA, Pt is a fall risk. Reviewed procedure, this is his 4 th generator change. Both son and pt state understanding. Reviewed discharge instructions, pt/son accept and understand. Chest clipped and skin prepped as ordered.

## 2019-08-14 ENCOUNTER — TELEPHONE (OUTPATIENT)
Dept: CARDIOLOGY | Facility: CLINIC | Age: 84
End: 2019-08-14

## 2019-08-14 NOTE — TELEPHONE ENCOUNTER
Post device implant discharge phone call.    Reviewed the following:    Remove outer dressing 4 days after implant. May shower after outer dressing removed. Leave steri-strips in place, will be removed at 1 week device check  Watch for redness, drainage, warmth, or fever. Call device clinic if any signs of infection.     1 week device check scheduled: 8/21/19.  Called and left message with instructions and appt date.

## 2019-08-15 LAB — INTERPRETATION ECG - MUSE: NORMAL

## 2019-08-19 ENCOUNTER — ANCILLARY PROCEDURE (OUTPATIENT)
Dept: CARDIOLOGY | Facility: CLINIC | Age: 84
End: 2019-08-19
Attending: INTERNAL MEDICINE
Payer: MEDICARE

## 2019-08-19 ENCOUNTER — TELEPHONE (OUTPATIENT)
Dept: CARDIOLOGY | Facility: CLINIC | Age: 84
End: 2019-08-19

## 2019-08-19 ENCOUNTER — DOCUMENTATION ONLY (OUTPATIENT)
Dept: CARDIOLOGY | Facility: CLINIC | Age: 84
End: 2019-08-19

## 2019-08-19 DIAGNOSIS — Z95.0 PACEMAKER: ICD-10-CM

## 2019-08-19 DIAGNOSIS — Z95.0 CARDIAC PACEMAKER IN SITU: ICD-10-CM

## 2019-08-19 DIAGNOSIS — I49.5 SSS (SICK SINUS SYNDROME) (H): Primary | ICD-10-CM

## 2019-08-19 PROCEDURE — 93280 PM DEVICE PROGR EVAL DUAL: CPT | Performed by: INTERNAL MEDICINE

## 2019-08-19 NOTE — TELEPHONE ENCOUNTER
Pt left voicemail this morning saying he wants to be seen today for some swelling. He had PPM gen change last week on Tuesday. He said on Wednesday he fell and landed on his chest and face. Yesterday on Sunday he noticed some increased swelling to his PPM site.     Called pt back. He had 1 week check scheduled for Wednesday 8/21/2019, I changed that to today at 11am. Pt agrees with this plan.

## 2019-08-19 NOTE — TELEPHONE ENCOUNTER
Pt called in with questions about his remote monitor. He said he was confused about the wand. I told him there is not wand, just the white Latitude box with a cellular adapter. I told him to plug the Latitude in within 10 feet of his bed and keep the cellular adapter plugged in. I went through this with him a couple times, but he wants to talk to Herminia PANIAGUA who he saw in clinic today. Will have Herminia RN call pt.

## 2019-08-19 NOTE — TELEPHONE ENCOUNTER
Called patient back regarding questions about his remote monitor and spoke with him and his son, Allen.  Pt wanted to know if he still needed to use a wand to send a remote transmission.  Explained that the remote transmission with his new pacemaker will now happen automatically while he is sleeping and he only needs to make sure it is plugged in within 5-10 feet of his bed.      Pt asked if he needed to bring his remote monitor with him if he were to go up to the cabin for a couple of days.  Explained that we typically recommend that people bring the remote monitor with if they are going to be gone for a couple of weeks.  If he is only gone for a couple of days, he does not need to bring the monitor with.  Explained that if he does bring it with, he will only need to plug the monitor in within 5-10 feet of where he sleeps at his cabin.     Pt verbalized understanding and appreciated the information.  He will call the clinic with any questions.      SIVA Van

## 2019-08-19 NOTE — TELEPHONE ENCOUNTER
Pt came into clinic today for his 1-week check.  He stated that he fell last Thursday, 8/15, and that he was moving boxes of paper from his office on Sunday, 8/18.  On Sunday, he stated his incision began to swell.  After assessing his incision, it is noted that the incision appears well approximated with a softening hematoma over the pacemaker site (covers device, approximately 2x1.5 inches, see photos below).  No drainage present.  There is yellowing ecchymosis over and above site with a small area of darkening ecchymosis along the mid-incision line.  Patient denies pain at site.      Dr. Thornton examined incision and no further actions at this time.  Will recheck incision in a couple weeks.    Instructed patient to try and minimize heavy lifting (less than 10-15 pounds) for the next couple of weeks until he comes back in for his incision recheck.  Pt instructed to watch incision for any signs of infection, including redness, drainage, fever, or increased swelling/hardness of hematoma around site.  Explained to patient that he may see bruising develop and extend below the pacemaker site into his lower chest and lateral chest as the hematoma continues to soften and resolve and that it is only concerning if the hematoma is becoming larger/firmer.  Pt and daughter, Cassie, verbalized understanding and will call the clinic with any questions, clinic cards provided.      Pt was scheduled for a return incision check on 9/5/19.    SIVA Van

## 2019-08-30 LAB
MDC_IDC_LEAD_IMPLANT_DT: NORMAL
MDC_IDC_LEAD_IMPLANT_DT: NORMAL
MDC_IDC_LEAD_LOCATION: NORMAL
MDC_IDC_LEAD_LOCATION: NORMAL
MDC_IDC_LEAD_MFG: NORMAL
MDC_IDC_LEAD_MFG: NORMAL
MDC_IDC_LEAD_MODEL: NORMAL
MDC_IDC_LEAD_MODEL: NORMAL
MDC_IDC_LEAD_POLARITY_TYPE: NORMAL
MDC_IDC_LEAD_POLARITY_TYPE: NORMAL
MDC_IDC_LEAD_SERIAL: NORMAL
MDC_IDC_LEAD_SERIAL: NORMAL
MDC_IDC_LEAD_SPECIAL_FUNCTION: NORMAL
MDC_IDC_LEAD_SPECIAL_FUNCTION: NORMAL
MDC_IDC_MSMT_BATTERY_STATUS: NORMAL
MDC_IDC_MSMT_LEADCHNL_RA_IMPEDANCE_VALUE: 496 OHM
MDC_IDC_MSMT_LEADCHNL_RA_PACING_THRESHOLD_AMPLITUDE: 1 V
MDC_IDC_MSMT_LEADCHNL_RA_PACING_THRESHOLD_PULSEWIDTH: 0.5 MS
MDC_IDC_MSMT_LEADCHNL_RA_SENSING_INTR_AMPL: 2.1 MV
MDC_IDC_MSMT_LEADCHNL_RV_IMPEDANCE_VALUE: 538 OHM
MDC_IDC_MSMT_LEADCHNL_RV_PACING_THRESHOLD_AMPLITUDE: 1.6 V
MDC_IDC_MSMT_LEADCHNL_RV_PACING_THRESHOLD_PULSEWIDTH: 0.4 MS
MDC_IDC_MSMT_LEADCHNL_RV_SENSING_INTR_AMPL: 2.2 MV
MDC_IDC_PG_IMPLANT_DTM: NORMAL
MDC_IDC_PG_MFG: NORMAL
MDC_IDC_PG_MODEL: NORMAL
MDC_IDC_PG_SERIAL: NORMAL
MDC_IDC_PG_TYPE: NORMAL
MDC_IDC_SESS_CLINIC_NAME: NORMAL
MDC_IDC_SESS_DTM: NORMAL
MDC_IDC_SESS_TYPE: NORMAL
MDC_IDC_SET_BRADY_AT_MODE_SWITCH_MODE: NORMAL
MDC_IDC_SET_BRADY_AT_MODE_SWITCH_RATE: 170 {BEATS}/MIN
MDC_IDC_SET_BRADY_LOWRATE: 70 {BEATS}/MIN
MDC_IDC_SET_BRADY_MAX_SENSOR_RATE: 130 {BEATS}/MIN
MDC_IDC_SET_BRADY_MAX_TRACKING_RATE: 130 {BEATS}/MIN
MDC_IDC_SET_BRADY_MODE: NORMAL
MDC_IDC_SET_BRADY_PAV_DELAY_HIGH: 220 MS
MDC_IDC_SET_BRADY_PAV_DELAY_LOW: 400 MS
MDC_IDC_SET_BRADY_SAV_DELAY_HIGH: 220 MS
MDC_IDC_SET_BRADY_SAV_DELAY_LOW: 400 MS
MDC_IDC_SET_LEADCHNL_RA_PACING_AMPLITUDE: 2 V
MDC_IDC_SET_LEADCHNL_RA_PACING_ANODE_ELECTRODE_1: NORMAL
MDC_IDC_SET_LEADCHNL_RA_PACING_ANODE_LOCATION_1: NORMAL
MDC_IDC_SET_LEADCHNL_RA_PACING_CAPTURE_MODE: NORMAL
MDC_IDC_SET_LEADCHNL_RA_PACING_CATHODE_ELECTRODE_1: NORMAL
MDC_IDC_SET_LEADCHNL_RA_PACING_CATHODE_LOCATION_1: NORMAL
MDC_IDC_SET_LEADCHNL_RA_PACING_POLARITY: NORMAL
MDC_IDC_SET_LEADCHNL_RA_PACING_PULSEWIDTH: 0.5 MS
MDC_IDC_SET_LEADCHNL_RA_SENSING_ADAPTATION_MODE: NORMAL
MDC_IDC_SET_LEADCHNL_RA_SENSING_ANODE_ELECTRODE_1: NORMAL
MDC_IDC_SET_LEADCHNL_RA_SENSING_ANODE_LOCATION_1: NORMAL
MDC_IDC_SET_LEADCHNL_RA_SENSING_CATHODE_ELECTRODE_1: NORMAL
MDC_IDC_SET_LEADCHNL_RA_SENSING_CATHODE_LOCATION_1: NORMAL
MDC_IDC_SET_LEADCHNL_RA_SENSING_POLARITY: NORMAL
MDC_IDC_SET_LEADCHNL_RA_SENSING_SENSITIVITY: 0.25 MV
MDC_IDC_SET_LEADCHNL_RV_PACING_AMPLITUDE: 2.4 V
MDC_IDC_SET_LEADCHNL_RV_PACING_ANODE_ELECTRODE_1: NORMAL
MDC_IDC_SET_LEADCHNL_RV_PACING_ANODE_LOCATION_1: NORMAL
MDC_IDC_SET_LEADCHNL_RV_PACING_CAPTURE_MODE: NORMAL
MDC_IDC_SET_LEADCHNL_RV_PACING_CATHODE_ELECTRODE_1: NORMAL
MDC_IDC_SET_LEADCHNL_RV_PACING_CATHODE_LOCATION_1: NORMAL
MDC_IDC_SET_LEADCHNL_RV_PACING_POLARITY: NORMAL
MDC_IDC_SET_LEADCHNL_RV_PACING_PULSEWIDTH: 0.4 MS
MDC_IDC_SET_LEADCHNL_RV_SENSING_ADAPTATION_MODE: NORMAL
MDC_IDC_SET_LEADCHNL_RV_SENSING_ANODE_ELECTRODE_1: NORMAL
MDC_IDC_SET_LEADCHNL_RV_SENSING_ANODE_LOCATION_1: NORMAL
MDC_IDC_SET_LEADCHNL_RV_SENSING_CATHODE_ELECTRODE_1: NORMAL
MDC_IDC_SET_LEADCHNL_RV_SENSING_CATHODE_LOCATION_1: NORMAL
MDC_IDC_SET_LEADCHNL_RV_SENSING_POLARITY: NORMAL
MDC_IDC_SET_LEADCHNL_RV_SENSING_SENSITIVITY: 1.5 MV
MDC_IDC_SET_ZONE_DETECTION_INTERVAL: 375 MS
MDC_IDC_SET_ZONE_TYPE: NORMAL
MDC_IDC_SET_ZONE_VENDOR_TYPE: NORMAL
MDC_IDC_STAT_EPISODE_RECENT_COUNT: 0
MDC_IDC_STAT_EPISODE_RECENT_COUNT_DTM_END: NORMAL
MDC_IDC_STAT_EPISODE_RECENT_COUNT_DTM_START: NORMAL
MDC_IDC_STAT_EPISODE_TOTAL_COUNT: 0
MDC_IDC_STAT_EPISODE_TOTAL_COUNT_DTM_END: NORMAL
MDC_IDC_STAT_EPISODE_TYPE: NORMAL
MDC_IDC_STAT_EPISODE_TYPE: NORMAL
MDC_IDC_STAT_EPISODE_VENDOR_TYPE: NORMAL
MDC_IDC_STAT_EPISODE_VENDOR_TYPE: NORMAL

## 2019-09-03 DIAGNOSIS — I25.10 CORONARY ARTERY DISEASE INVOLVING NATIVE CORONARY ARTERY OF NATIVE HEART WITHOUT ANGINA PECTORIS: Primary | ICD-10-CM

## 2019-09-03 DIAGNOSIS — E78.2 MIXED HYPERLIPIDEMIA: ICD-10-CM

## 2019-09-05 ENCOUNTER — ANCILLARY PROCEDURE (OUTPATIENT)
Dept: CARDIOLOGY | Facility: CLINIC | Age: 84
End: 2019-09-05
Attending: INTERNAL MEDICINE
Payer: MEDICARE

## 2019-09-05 ENCOUNTER — DOCUMENTATION ONLY (OUTPATIENT)
Dept: CARDIOLOGY | Facility: CLINIC | Age: 84
End: 2019-09-05

## 2019-09-05 DIAGNOSIS — I49.5 SSS (SICK SINUS SYNDROME) (H): ICD-10-CM

## 2019-09-05 DIAGNOSIS — Z95.0 CARDIAC PACEMAKER IN SITU: Primary | ICD-10-CM

## 2019-09-05 DIAGNOSIS — Z95.0 CARDIAC PACEMAKER IN SITU: ICD-10-CM

## 2019-09-05 NOTE — TELEPHONE ENCOUNTER
"Pt had a hematoma present on his 7 day device check and returns to clinic today for an incision check.  Incision is well approximated with no redness, drainage, or ecchymosis at site.  There was some yellowing purple ecchymosis under his left breast.  There was a firm hematoma over the device, measuring 2.5\"x3\"x0.75\" deep (see below for photos).  Pt denies discomfort at site.  Crystal Omalley CNP visualized incision and ordered for the Eliquis to be held for 4 days and for an incision recheck in 2 weeks.  Pt scheduled for a return incision check on 9/19/19.  Pt and his daughter, Brianne, verbalized understanding and will call the clinic with any questions, clinic cards provided.    SIVA Van    Pictures from 9/5/19:                          Pictures from 7-day check on 8/19/19:             "

## 2019-09-06 ENCOUNTER — CARE COORDINATION (OUTPATIENT)
Dept: CARDIOLOGY | Facility: CLINIC | Age: 84
End: 2019-09-06

## 2019-09-06 DIAGNOSIS — I10 BENIGN ESSENTIAL HYPERTENSION: Primary | ICD-10-CM

## 2019-09-06 DIAGNOSIS — E78.2 MIXED HYPERLIPIDEMIA: ICD-10-CM

## 2019-09-06 DIAGNOSIS — I48.91 A-FIB (H): ICD-10-CM

## 2019-09-06 DIAGNOSIS — I25.10 CORONARY ARTERY DISEASE INVOLVING NATIVE CORONARY ARTERY OF NATIVE HEART WITHOUT ANGINA PECTORIS: Primary | ICD-10-CM

## 2019-09-06 RX ORDER — EZETIMIBE 10 MG/1
10 TABLET ORAL DAILY
Qty: 90 TABLET | Refills: 0 | Status: SHIPPED | OUTPATIENT
Start: 2019-09-06 | End: 2019-01-01

## 2019-09-06 RX ORDER — ROSUVASTATIN CALCIUM 20 MG/1
20 TABLET, COATED ORAL DAILY
Qty: 90 TABLET | Refills: 0 | Status: SHIPPED | OUTPATIENT
Start: 2019-09-06 | End: 2020-01-01

## 2019-09-06 RX ORDER — EZETIMIBE AND SIMVASTATIN 10; 40 MG/1; MG/1
1 TABLET ORAL AT BEDTIME
Qty: 90 TABLET | Refills: 3 | Status: SHIPPED | OUTPATIENT
Start: 2019-09-06 | End: 2019-09-06

## 2019-09-06 RX ORDER — AMIODARONE HYDROCHLORIDE 100 MG/1
100 TABLET ORAL DAILY
Qty: 90 TABLET | Refills: 3 | Status: SHIPPED | OUTPATIENT
Start: 2019-09-06 | End: 2020-01-01

## 2019-09-06 RX ORDER — METOPROLOL SUCCINATE 50 MG/1
50 TABLET, EXTENDED RELEASE ORAL DAILY
Qty: 90 TABLET | Refills: 3 | Status: SHIPPED | OUTPATIENT
Start: 2019-09-06 | End: 2020-01-01

## 2019-09-06 NOTE — PROGRESS NOTES
Received call from pt to refill his medications. Including Vytorin & amiodarone. Received warning of drug interaction between these meds. Called [facility] to stop the Vytorin & will message Dr. Lyon to review. Norma RN     !LIPID/HEPATIC Latest Ref Rng & Units 5/22/2018 7/18/2018   CHOLESTEROL 100 - 199 mg/dL 119 132   TRIGLYCERIDES <150 mg/dL 64 78   HDL CHOLESTEROL >40 mg/dL 56 57   LDL CHOLESTEROL, CALCULATED <=130 mg/dL 50 59   VLDL-CHOLESTEROL <40 mg/dL     NON HDL CHOLESTEROL <130 mg/dL 63    CHOLESTEROL/HDL RATIO <4.4     AST 0 - 45 U/L 21    ALT 0 - 70 U/L 29

## 2019-09-06 NOTE — PROGRESS NOTES
Called pt with recommendations from Dr. Lyon to stop the Vytorin & start rosuvastatin 20 mg & ezetimibe 10 mg daily. Prescriptions escripted to [facility] & orders placed for flp/alt in 6 weeks. Pt scheduled 10/18/19. Pt aware to call for any new onset of muscle aching or weakness. Norma PANIAGUA

## 2019-09-19 ENCOUNTER — ANCILLARY PROCEDURE (OUTPATIENT)
Dept: CARDIOLOGY | Facility: CLINIC | Age: 84
End: 2019-09-19
Attending: NURSE PRACTITIONER
Payer: MEDICARE

## 2019-09-19 ENCOUNTER — DOCUMENTATION ONLY (OUTPATIENT)
Dept: CARDIOLOGY | Facility: CLINIC | Age: 84
End: 2019-09-19

## 2019-09-19 DIAGNOSIS — Z95.0 CARDIAC PACEMAKER IN SITU: ICD-10-CM

## 2019-09-19 DIAGNOSIS — E78.2 MIXED HYPERLIPIDEMIA: ICD-10-CM

## 2019-09-19 DIAGNOSIS — I25.10 CORONARY ARTERY DISEASE INVOLVING NATIVE CORONARY ARTERY OF NATIVE HEART WITHOUT ANGINA PECTORIS: ICD-10-CM

## 2019-09-19 LAB
ALT SERPL W P-5'-P-CCNC: <5 U/L (ref 5–30)
CHOLEST SERPL-MCNC: 131 MG/DL
HDLC SERPL-MCNC: 53 MG/DL
LDLC SERPL CALC-MCNC: 62 MG/DL
NONHDLC SERPL-MCNC: 78 MG/DL
TRIGL SERPL-MCNC: 80 MG/DL

## 2019-09-19 PROCEDURE — 84460 ALANINE AMINO (ALT) (SGPT): CPT | Performed by: INTERNAL MEDICINE

## 2019-09-19 PROCEDURE — 36415 COLL VENOUS BLD VENIPUNCTURE: CPT | Performed by: INTERNAL MEDICINE

## 2019-09-19 PROCEDURE — 80061 LIPID PANEL: CPT | Performed by: INTERNAL MEDICINE

## 2019-09-19 NOTE — TELEPHONE ENCOUNTER
Courtesy wound check. 1 month post gen change and fall injuring site.    Incision is well healed, no redness, drainage or bruising at site. There is a soft hematoma over device but you can feel the edges of the can. Pictures taken and place in Epic to compare. When the pictures compared to 9/5 visit, the site is greatly improved with   less swelling.

## 2019-10-02 ENCOUNTER — ANCILLARY PROCEDURE (OUTPATIENT)
Dept: CARDIOLOGY | Facility: CLINIC | Age: 84
End: 2019-10-02
Attending: INTERNAL MEDICINE
Payer: MEDICARE

## 2019-10-02 ENCOUNTER — DOCUMENTATION ONLY (OUTPATIENT)
Dept: CARDIOLOGY | Facility: CLINIC | Age: 84
End: 2019-10-02

## 2019-10-02 DIAGNOSIS — Z95.0 CARDIAC PACEMAKER IN SITU: ICD-10-CM

## 2019-10-02 NOTE — TELEPHONE ENCOUNTER
"Patient came into clinic today for a repeat check on his pacemaker incision.  There is a resolving/improving hematoma over the incision that is approximately 1\"x1.25\" by 1/3\"h.  Pt denies pain with palpation.  Incision is CDI with no signs of infection and a small healing pink area on the edge of the incision.    Pt instructed to call the clinic if he notices any changes to his site (drainage, increased swelling, increased redness).  Next device check will be a routine remote device check from home that is scheduled for 11/20/19.      SIVA Van                   "

## 2020-01-01 ENCOUNTER — APPOINTMENT (OUTPATIENT)
Dept: PHYSICAL THERAPY | Facility: CLINIC | Age: 85
DRG: 177 | End: 2020-01-01
Payer: MEDICARE

## 2020-01-01 ENCOUNTER — AMBULATORY - HEALTHEAST (OUTPATIENT)
Dept: OTHER | Facility: CLINIC | Age: 85
End: 2020-01-01

## 2020-01-01 ENCOUNTER — TELEPHONE (OUTPATIENT)
Dept: CARDIOLOGY | Facility: CLINIC | Age: 85
End: 2020-01-01

## 2020-01-01 ENCOUNTER — DOCUMENTATION ONLY (OUTPATIENT)
Dept: OTHER | Facility: CLINIC | Age: 85
End: 2020-01-01

## 2020-01-01 ENCOUNTER — APPOINTMENT (OUTPATIENT)
Dept: GENERAL RADIOLOGY | Facility: CLINIC | Age: 85
DRG: 177 | End: 2020-01-01
Attending: INTERNAL MEDICINE
Payer: MEDICARE

## 2020-01-01 ENCOUNTER — APPOINTMENT (OUTPATIENT)
Dept: PHYSICAL THERAPY | Facility: CLINIC | Age: 85
DRG: 177 | End: 2020-01-01
Attending: INTERNAL MEDICINE
Payer: MEDICARE

## 2020-01-01 ENCOUNTER — NURSE TRIAGE (OUTPATIENT)
Dept: NURSING | Facility: CLINIC | Age: 85
End: 2020-01-01

## 2020-01-01 ENCOUNTER — APPOINTMENT (OUTPATIENT)
Dept: CT IMAGING | Facility: CLINIC | Age: 85
DRG: 177 | End: 2020-01-01
Attending: NURSE PRACTITIONER
Payer: MEDICARE

## 2020-01-01 ENCOUNTER — ANCILLARY PROCEDURE (OUTPATIENT)
Dept: CARDIOLOGY | Facility: CLINIC | Age: 85
End: 2020-01-01
Attending: INTERNAL MEDICINE
Payer: MEDICARE

## 2020-01-01 ENCOUNTER — APPOINTMENT (OUTPATIENT)
Dept: GENERAL RADIOLOGY | Facility: CLINIC | Age: 85
DRG: 177 | End: 2020-01-01
Attending: EMERGENCY MEDICINE
Payer: MEDICARE

## 2020-01-01 ENCOUNTER — HOSPITAL ENCOUNTER (INPATIENT)
Facility: CLINIC | Age: 85
LOS: 10 days | DRG: 177 | End: 2020-11-07
Attending: EMERGENCY MEDICINE | Admitting: INTERNAL MEDICINE
Payer: MEDICARE

## 2020-01-01 ENCOUNTER — HEALTH MAINTENANCE LETTER (OUTPATIENT)
Age: 85
End: 2020-01-01

## 2020-01-01 ENCOUNTER — VIRTUAL VISIT (OUTPATIENT)
Dept: CARDIOLOGY | Facility: CLINIC | Age: 85
End: 2020-01-01
Attending: PHYSICIAN ASSISTANT
Payer: MEDICARE

## 2020-01-01 VITALS
HEART RATE: 73 BPM | WEIGHT: 156.09 LBS | BODY MASS INDEX: 25.19 KG/M2 | OXYGEN SATURATION: 96 % | RESPIRATION RATE: 36 BRPM | TEMPERATURE: 101.8 F | SYSTOLIC BLOOD PRESSURE: 136 MMHG | DIASTOLIC BLOOD PRESSURE: 68 MMHG

## 2020-01-01 VITALS — HEIGHT: 66 IN | WEIGHT: 180 LBS | BODY MASS INDEX: 28.93 KG/M2

## 2020-01-01 DIAGNOSIS — I10 BENIGN ESSENTIAL HYPERTENSION: ICD-10-CM

## 2020-01-01 DIAGNOSIS — I48.0 PAROXYSMAL ATRIAL FIBRILLATION (H): ICD-10-CM

## 2020-01-01 DIAGNOSIS — E78.2 MIXED HYPERLIPIDEMIA: ICD-10-CM

## 2020-01-01 DIAGNOSIS — I48.0 PAROXYSMAL A-FIB (H): ICD-10-CM

## 2020-01-01 DIAGNOSIS — I48.91 A-FIB (H): ICD-10-CM

## 2020-01-01 DIAGNOSIS — I49.5 SSS (SICK SINUS SYNDROME) (H): ICD-10-CM

## 2020-01-01 DIAGNOSIS — I10 ESSENTIAL HYPERTENSION WITH GOAL BLOOD PRESSURE LESS THAN 140/90: ICD-10-CM

## 2020-01-01 DIAGNOSIS — I25.10 CORONARY ARTERY DISEASE INVOLVING NATIVE CORONARY ARTERY OF NATIVE HEART WITHOUT ANGINA PECTORIS: Primary | ICD-10-CM

## 2020-01-01 DIAGNOSIS — U07.1 CLINICAL DIAGNOSIS OF COVID-19: ICD-10-CM

## 2020-01-01 DIAGNOSIS — Z95.0 CARDIAC PACEMAKER IN SITU: ICD-10-CM

## 2020-01-01 DIAGNOSIS — I25.10 CORONARY ARTERY DISEASE INVOLVING NATIVE CORONARY ARTERY OF NATIVE HEART WITHOUT ANGINA PECTORIS: ICD-10-CM

## 2020-01-01 DIAGNOSIS — Z79.899 LONG TERM CURRENT USE OF AMIODARONE: ICD-10-CM

## 2020-01-01 DIAGNOSIS — Z95.0 PACEMAKER: ICD-10-CM

## 2020-01-01 DIAGNOSIS — M62.81 GENERALIZED MUSCLE WEAKNESS: ICD-10-CM

## 2020-01-01 LAB
ALBUMIN SERPL-MCNC: 2.5 G/DL (ref 3.4–5)
ALBUMIN SERPL-MCNC: 2.6 G/DL (ref 3.4–5)
ALBUMIN SERPL-MCNC: 2.7 G/DL (ref 3.4–5)
ALBUMIN SERPL-MCNC: 2.9 G/DL (ref 3.4–5)
ALBUMIN SERPL-MCNC: 3 G/DL (ref 3.4–5)
ALBUMIN UR-MCNC: 10 MG/DL
ALBUMIN UR-MCNC: 30 MG/DL
ALP SERPL-CCNC: 102 U/L (ref 40–150)
ALP SERPL-CCNC: 110 U/L (ref 40–150)
ALP SERPL-CCNC: 79 U/L (ref 40–150)
ALP SERPL-CCNC: 91 U/L (ref 40–150)
ALP SERPL-CCNC: 97 U/L (ref 40–150)
ALT SERPL W P-5'-P-CCNC: 28 U/L (ref 0–70)
ALT SERPL W P-5'-P-CCNC: 31 U/L (ref 0–70)
ALT SERPL W P-5'-P-CCNC: 32 U/L (ref 0–70)
ALT SERPL W P-5'-P-CCNC: 33 U/L (ref 0–70)
ALT SERPL W P-5'-P-CCNC: 35 U/L (ref 0–70)
ANION GAP SERPL CALCULATED.3IONS-SCNC: 2 MMOL/L (ref 3–14)
ANION GAP SERPL CALCULATED.3IONS-SCNC: 3 MMOL/L (ref 3–14)
ANION GAP SERPL CALCULATED.3IONS-SCNC: 3 MMOL/L (ref 3–14)
ANION GAP SERPL CALCULATED.3IONS-SCNC: 5 MMOL/L (ref 3–14)
ANION GAP SERPL CALCULATED.3IONS-SCNC: 7 MMOL/L (ref 3–14)
ANION GAP SERPL CALCULATED.3IONS-SCNC: 7 MMOL/L (ref 3–14)
ANION GAP SERPL CALCULATED.3IONS-SCNC: 8 MMOL/L (ref 3–14)
APPEARANCE UR: CLEAR
APPEARANCE UR: CLEAR
APTT PPP: 45 SEC (ref 22–37)
AST SERPL W P-5'-P-CCNC: 34 U/L (ref 0–45)
AST SERPL W P-5'-P-CCNC: 38 U/L (ref 0–45)
AST SERPL W P-5'-P-CCNC: 41 U/L (ref 0–45)
AST SERPL W P-5'-P-CCNC: 45 U/L (ref 0–45)
AST SERPL W P-5'-P-CCNC: 46 U/L (ref 0–45)
BACTERIA SPEC CULT: NO GROWTH
BACTERIA SPEC CULT: NO GROWTH
BASOPHILS # BLD AUTO: 0 10E9/L (ref 0–0.2)
BASOPHILS NFR BLD AUTO: 0 %
BASOPHILS NFR BLD AUTO: 0.1 %
BASOPHILS NFR BLD AUTO: 0.2 %
BASOPHILS NFR BLD AUTO: 0.2 %
BILIRUB DIRECT SERPL-MCNC: 0.2 MG/DL (ref 0–0.2)
BILIRUB DIRECT SERPL-MCNC: 0.2 MG/DL (ref 0–0.2)
BILIRUB SERPL-MCNC: 0.5 MG/DL (ref 0.2–1.3)
BILIRUB SERPL-MCNC: 0.6 MG/DL (ref 0.2–1.3)
BILIRUB SERPL-MCNC: 1.2 MG/DL (ref 0.2–1.3)
BILIRUB SERPL-MCNC: 1.3 MG/DL (ref 0.2–1.3)
BILIRUB SERPL-MCNC: 1.4 MG/DL (ref 0.2–1.3)
BILIRUB UR QL STRIP: NEGATIVE
BILIRUB UR QL STRIP: NEGATIVE
BUN SERPL-MCNC: 21 MG/DL (ref 7–30)
BUN SERPL-MCNC: 23 MG/DL (ref 7–30)
BUN SERPL-MCNC: 24 MG/DL (ref 7–30)
BUN SERPL-MCNC: 26 MG/DL (ref 7–30)
BUN SERPL-MCNC: 27 MG/DL (ref 7–30)
BUN SERPL-MCNC: 28 MG/DL (ref 7–30)
BUN SERPL-MCNC: 33 MG/DL (ref 7–30)
CALCIUM SERPL-MCNC: 7.7 MG/DL (ref 8.5–10.1)
CALCIUM SERPL-MCNC: 7.9 MG/DL (ref 8.5–10.1)
CALCIUM SERPL-MCNC: 8.1 MG/DL (ref 8.5–10.1)
CALCIUM SERPL-MCNC: 8.3 MG/DL (ref 8.5–10.1)
CALCIUM SERPL-MCNC: 8.3 MG/DL (ref 8.5–10.1)
CALCIUM SERPL-MCNC: 8.4 MG/DL (ref 8.5–10.1)
CALCIUM SERPL-MCNC: 8.4 MG/DL (ref 8.5–10.1)
CHLORIDE SERPL-SCNC: 101 MMOL/L (ref 94–109)
CHLORIDE SERPL-SCNC: 102 MMOL/L (ref 94–109)
CHLORIDE SERPL-SCNC: 104 MMOL/L (ref 94–109)
CHLORIDE SERPL-SCNC: 105 MMOL/L (ref 94–109)
CHLORIDE SERPL-SCNC: 99 MMOL/L (ref 94–109)
CK SERPL-CCNC: 125 U/L (ref 30–300)
CO2 SERPL-SCNC: 24 MMOL/L (ref 20–32)
CO2 SERPL-SCNC: 25 MMOL/L (ref 20–32)
CO2 SERPL-SCNC: 25 MMOL/L (ref 20–32)
CO2 SERPL-SCNC: 27 MMOL/L (ref 20–32)
CO2 SERPL-SCNC: 27 MMOL/L (ref 20–32)
CO2 SERPL-SCNC: 29 MMOL/L (ref 20–32)
CO2 SERPL-SCNC: 30 MMOL/L (ref 20–32)
COLOR UR AUTO: YELLOW
COLOR UR AUTO: YELLOW
CREAT SERPL-MCNC: 0.8 MG/DL (ref 0.66–1.25)
CREAT SERPL-MCNC: 0.84 MG/DL (ref 0.66–1.25)
CREAT SERPL-MCNC: 0.92 MG/DL (ref 0.66–1.25)
CREAT SERPL-MCNC: 0.92 MG/DL (ref 0.66–1.25)
CREAT SERPL-MCNC: 0.93 MG/DL (ref 0.66–1.25)
CREAT SERPL-MCNC: 0.94 MG/DL (ref 0.66–1.25)
CREAT SERPL-MCNC: 1.06 MG/DL (ref 0.66–1.25)
CRP SERPL-MCNC: 18.7 MG/L (ref 0–8)
CRP SERPL-MCNC: 25.5 MG/L (ref 0–8)
CRP SERPL-MCNC: 36.9 MG/L (ref 0–8)
CRP SERPL-MCNC: 40.9 MG/L (ref 0–8)
CRP SERPL-MCNC: 65.6 MG/L (ref 0–8)
CRP SERPL-MCNC: 67.6 MG/L (ref 0–8)
CRP SERPL-MCNC: 68.3 MG/L (ref 0–8)
CRP SERPL-MCNC: 93.2 MG/L (ref 0–8)
D DIMER PPP FEU-MCNC: 0.6 UG/ML FEU (ref 0–0.5)
D DIMER PPP FEU-MCNC: 0.7 UG/ML FEU (ref 0–0.5)
D DIMER PPP FEU-MCNC: 0.8 UG/ML FEU (ref 0–0.5)
D DIMER PPP FEU-MCNC: 0.9 UG/ML FEU (ref 0–0.5)
DIFFERENTIAL METHOD BLD: ABNORMAL
EOSINOPHIL # BLD AUTO: 0 10E9/L (ref 0–0.7)
EOSINOPHIL NFR BLD AUTO: 0 %
EOSINOPHIL NFR BLD AUTO: 0.2 %
ERYTHROCYTE [DISTWIDTH] IN BLOOD BY AUTOMATED COUNT: 13.1 % (ref 10–15)
ERYTHROCYTE [DISTWIDTH] IN BLOOD BY AUTOMATED COUNT: 13.2 % (ref 10–15)
ERYTHROCYTE [DISTWIDTH] IN BLOOD BY AUTOMATED COUNT: 13.2 % (ref 10–15)
ERYTHROCYTE [DISTWIDTH] IN BLOOD BY AUTOMATED COUNT: 13.3 % (ref 10–15)
ERYTHROCYTE [DISTWIDTH] IN BLOOD BY AUTOMATED COUNT: 13.4 % (ref 10–15)
ERYTHROCYTE [DISTWIDTH] IN BLOOD BY AUTOMATED COUNT: 13.4 % (ref 10–15)
ERYTHROCYTE [DISTWIDTH] IN BLOOD BY AUTOMATED COUNT: 13.5 % (ref 10–15)
ERYTHROCYTE [DISTWIDTH] IN BLOOD BY AUTOMATED COUNT: 13.7 % (ref 10–15)
ERYTHROCYTE [DISTWIDTH] IN BLOOD BY AUTOMATED COUNT: 13.8 % (ref 10–15)
ERYTHROCYTE [SEDIMENTATION RATE] IN BLOOD BY WESTERGREN METHOD: 28 MM/H (ref 0–20)
FERRITIN SERPL-MCNC: 1587 NG/ML (ref 26–388)
FIBRINOGEN PPP-MCNC: 401 MG/DL (ref 200–420)
FIBRINOGEN PPP-MCNC: 553 MG/DL (ref 200–420)
FIBRINOGEN PPP-MCNC: 585 MG/DL (ref 200–420)
FIBRINOGEN PPP-MCNC: 585 MG/DL (ref 200–420)
FIBRINOGEN PPP-MCNC: 607 MG/DL (ref 200–420)
FLUAV+FLUBV RNA SPEC QL NAA+PROBE: NEGATIVE
FLUAV+FLUBV RNA SPEC QL NAA+PROBE: NEGATIVE
FOLATE SERPL-MCNC: 18.5 NG/ML
GFR SERPL CREATININE-BSD FRML MDRD: 60 ML/MIN/{1.73_M2}
GFR SERPL CREATININE-BSD FRML MDRD: 69 ML/MIN/{1.73_M2}
GFR SERPL CREATININE-BSD FRML MDRD: 71 ML/MIN/{1.73_M2}
GFR SERPL CREATININE-BSD FRML MDRD: 75 ML/MIN/{1.73_M2}
GFR SERPL CREATININE-BSD FRML MDRD: 77 ML/MIN/{1.73_M2}
GLUCOSE BLDC GLUCOMTR-MCNC: 127 MG/DL (ref 70–99)
GLUCOSE SERPL-MCNC: 106 MG/DL (ref 70–99)
GLUCOSE SERPL-MCNC: 108 MG/DL (ref 70–99)
GLUCOSE SERPL-MCNC: 109 MG/DL (ref 70–99)
GLUCOSE SERPL-MCNC: 114 MG/DL (ref 70–99)
GLUCOSE SERPL-MCNC: 176 MG/DL (ref 70–99)
GLUCOSE SERPL-MCNC: 186 MG/DL (ref 70–99)
GLUCOSE SERPL-MCNC: 93 MG/DL (ref 70–99)
GLUCOSE UR STRIP-MCNC: NEGATIVE MG/DL
GLUCOSE UR STRIP-MCNC: NEGATIVE MG/DL
HCT VFR BLD AUTO: 38.8 % (ref 40–53)
HCT VFR BLD AUTO: 39 % (ref 40–53)
HCT VFR BLD AUTO: 39.6 % (ref 40–53)
HCT VFR BLD AUTO: 40 % (ref 40–53)
HCT VFR BLD AUTO: 40.6 % (ref 40–53)
HCT VFR BLD AUTO: 41.6 % (ref 40–53)
HCT VFR BLD AUTO: 42.4 % (ref 40–53)
HCT VFR BLD AUTO: 42.6 % (ref 40–53)
HCT VFR BLD AUTO: 44.7 % (ref 40–53)
HGB BLD-MCNC: 13.7 G/DL (ref 13.3–17.7)
HGB BLD-MCNC: 13.8 G/DL (ref 13.3–17.7)
HGB BLD-MCNC: 13.9 G/DL (ref 13.3–17.7)
HGB BLD-MCNC: 14 G/DL (ref 13.3–17.7)
HGB BLD-MCNC: 14.2 G/DL (ref 13.3–17.7)
HGB BLD-MCNC: 14.6 G/DL (ref 13.3–17.7)
HGB BLD-MCNC: 15 G/DL (ref 13.3–17.7)
HGB BLD-MCNC: 15 G/DL (ref 13.3–17.7)
HGB BLD-MCNC: 15.4 G/DL (ref 13.3–17.7)
HGB UR QL STRIP: NEGATIVE
HGB UR QL STRIP: NEGATIVE
HYALINE CASTS #/AREA URNS LPF: 1 /LPF (ref 0–2)
HYALINE CASTS #/AREA URNS LPF: 1 /LPF (ref 0–2)
IMM GRANULOCYTES # BLD: 0 10E9/L (ref 0–0.4)
IMM GRANULOCYTES # BLD: 0.1 10E9/L (ref 0–0.4)
IMM GRANULOCYTES NFR BLD: 0.2 %
IMM GRANULOCYTES NFR BLD: 0.2 %
IMM GRANULOCYTES NFR BLD: 0.3 %
IMM GRANULOCYTES NFR BLD: 0.4 %
IMM GRANULOCYTES NFR BLD: 0.6 %
IMM GRANULOCYTES NFR BLD: 1.3 %
INR PPP: 1.33 (ref 0.86–1.14)
INR PPP: 1.37 (ref 0.86–1.14)
INR PPP: 1.57 (ref 0.86–1.14)
INR PPP: 1.65 (ref 0.86–1.14)
INR PPP: 2.12 (ref 0.86–1.14)
INR PPP: 2.77 (ref 0.86–1.14)
KETONES UR STRIP-MCNC: 5 MG/DL
KETONES UR STRIP-MCNC: NEGATIVE MG/DL
LABORATORY COMMENT REPORT: ABNORMAL
LACTATE BLD-SCNC: 0.9 MMOL/L (ref 0.7–2)
LACTATE BLD-SCNC: 1.1 MMOL/L (ref 0.7–2)
LDH SERPL L TO P-CCNC: 360 U/L (ref 85–227)
LDH SERPL L TO P-CCNC: 366 U/L (ref 85–227)
LDH SERPL L TO P-CCNC: 488 U/L (ref 85–227)
LDH SERPL L TO P-CCNC: 538 U/L (ref 85–227)
LDH SERPL L TO P-CCNC: 633 U/L (ref 85–227)
LDH SERPL L TO P-CCNC: 690 U/L (ref 85–227)
LEUKOCYTE ESTERASE UR QL STRIP: NEGATIVE
LEUKOCYTE ESTERASE UR QL STRIP: NEGATIVE
LYMPHOCYTES # BLD AUTO: 0.5 10E9/L (ref 0.8–5.3)
LYMPHOCYTES # BLD AUTO: 0.5 10E9/L (ref 0.8–5.3)
LYMPHOCYTES # BLD AUTO: 0.6 10E9/L (ref 0.8–5.3)
LYMPHOCYTES # BLD AUTO: 0.7 10E9/L (ref 0.8–5.3)
LYMPHOCYTES # BLD AUTO: 0.8 10E9/L (ref 0.8–5.3)
LYMPHOCYTES # BLD AUTO: 0.9 10E9/L (ref 0.8–5.3)
LYMPHOCYTES NFR BLD AUTO: 11.4 %
LYMPHOCYTES NFR BLD AUTO: 22.4 %
LYMPHOCYTES NFR BLD AUTO: 3 %
LYMPHOCYTES NFR BLD AUTO: 5.2 %
LYMPHOCYTES NFR BLD AUTO: 7 %
LYMPHOCYTES NFR BLD AUTO: 9.1 %
MCH RBC QN AUTO: 34.7 PG (ref 26.5–33)
MCH RBC QN AUTO: 34.8 PG (ref 26.5–33)
MCH RBC QN AUTO: 34.9 PG (ref 26.5–33)
MCH RBC QN AUTO: 35 PG (ref 26.5–33)
MCH RBC QN AUTO: 35.1 PG (ref 26.5–33)
MCH RBC QN AUTO: 35.4 PG (ref 26.5–33)
MCH RBC QN AUTO: 35.5 PG (ref 26.5–33)
MCH RBC QN AUTO: 35.6 PG (ref 26.5–33)
MCH RBC QN AUTO: 35.8 PG (ref 26.5–33)
MCHC RBC AUTO-ENTMCNC: 34.5 G/DL (ref 31.5–36.5)
MCHC RBC AUTO-ENTMCNC: 35 G/DL (ref 31.5–36.5)
MCHC RBC AUTO-ENTMCNC: 35 G/DL (ref 31.5–36.5)
MCHC RBC AUTO-ENTMCNC: 35.1 G/DL (ref 31.5–36.5)
MCHC RBC AUTO-ENTMCNC: 35.1 G/DL (ref 31.5–36.5)
MCHC RBC AUTO-ENTMCNC: 35.2 G/DL (ref 31.5–36.5)
MCHC RBC AUTO-ENTMCNC: 35.3 G/DL (ref 31.5–36.5)
MCHC RBC AUTO-ENTMCNC: 35.4 G/DL (ref 31.5–36.5)
MCHC RBC AUTO-ENTMCNC: 35.4 G/DL (ref 31.5–36.5)
MCV RBC AUTO: 100 FL (ref 78–100)
MCV RBC AUTO: 101 FL (ref 78–100)
MCV RBC AUTO: 102 FL (ref 78–100)
MCV RBC AUTO: 103 FL (ref 78–100)
MCV RBC AUTO: 98 FL (ref 78–100)
MCV RBC AUTO: 99 FL (ref 78–100)
MCV RBC AUTO: 99 FL (ref 78–100)
MDC_IDC_EPISODE_DTM: NORMAL
MDC_IDC_EPISODE_ID: NORMAL
MDC_IDC_EPISODE_TYPE: NORMAL
MDC_IDC_LEAD_IMPLANT_DT: NORMAL
MDC_IDC_LEAD_LOCATION: NORMAL
MDC_IDC_LEAD_MFG: NORMAL
MDC_IDC_LEAD_MODEL: NORMAL
MDC_IDC_LEAD_POLARITY_TYPE: NORMAL
MDC_IDC_LEAD_SERIAL: NORMAL
MDC_IDC_LEAD_SPECIAL_FUNCTION: NORMAL
MDC_IDC_MSMT_BATTERY_DTM: NORMAL
MDC_IDC_MSMT_BATTERY_DTM: NORMAL
MDC_IDC_MSMT_BATTERY_REMAINING_LONGEVITY: 150 MO
MDC_IDC_MSMT_BATTERY_REMAINING_LONGEVITY: 156 MO
MDC_IDC_MSMT_BATTERY_REMAINING_PERCENTAGE: 100 %
MDC_IDC_MSMT_BATTERY_REMAINING_PERCENTAGE: 100 %
MDC_IDC_MSMT_BATTERY_STATUS: NORMAL
MDC_IDC_MSMT_LEADCHNL_RA_IMPEDANCE_VALUE: 447 OHM
MDC_IDC_MSMT_LEADCHNL_RA_IMPEDANCE_VALUE: 485 OHM
MDC_IDC_MSMT_LEADCHNL_RA_IMPEDANCE_VALUE: 490 OHM
MDC_IDC_MSMT_LEADCHNL_RA_PACING_THRESHOLD_AMPLITUDE: 0.9 V
MDC_IDC_MSMT_LEADCHNL_RA_PACING_THRESHOLD_PULSEWIDTH: 0.5 MS
MDC_IDC_MSMT_LEADCHNL_RA_SENSING_INTR_AMPL: NORMAL
MDC_IDC_MSMT_LEADCHNL_RV_IMPEDANCE_VALUE: 487 OHM
MDC_IDC_MSMT_LEADCHNL_RV_IMPEDANCE_VALUE: 537 OHM
MDC_IDC_MSMT_LEADCHNL_RV_IMPEDANCE_VALUE: 549 OHM
MDC_IDC_MSMT_LEADCHNL_RV_PACING_THRESHOLD_AMPLITUDE: 1.5 V
MDC_IDC_MSMT_LEADCHNL_RV_PACING_THRESHOLD_AMPLITUDE: 2.2 V
MDC_IDC_MSMT_LEADCHNL_RV_PACING_THRESHOLD_AMPLITUDE: 2.4 V
MDC_IDC_MSMT_LEADCHNL_RV_PACING_THRESHOLD_PULSEWIDTH: 0.4 MS
MDC_IDC_MSMT_LEADCHNL_RV_SENSING_INTR_AMPL: 3.1 MV
MDC_IDC_PG_IMPLANT_DTM: NORMAL
MDC_IDC_PG_MFG: NORMAL
MDC_IDC_PG_MODEL: NORMAL
MDC_IDC_PG_SERIAL: NORMAL
MDC_IDC_PG_TYPE: NORMAL
MDC_IDC_SESS_CLINIC_NAME: NORMAL
MDC_IDC_SESS_DTM: NORMAL
MDC_IDC_SESS_TYPE: NORMAL
MDC_IDC_SET_BRADY_AT_MODE_SWITCH_MODE: NORMAL
MDC_IDC_SET_BRADY_AT_MODE_SWITCH_RATE: 170 {BEATS}/MIN
MDC_IDC_SET_BRADY_LOWRATE: 70 {BEATS}/MIN
MDC_IDC_SET_BRADY_MAX_SENSOR_RATE: 130 {BEATS}/MIN
MDC_IDC_SET_BRADY_MAX_TRACKING_RATE: 130 {BEATS}/MIN
MDC_IDC_SET_BRADY_MODE: NORMAL
MDC_IDC_SET_BRADY_PAV_DELAY_HIGH: 220 MS
MDC_IDC_SET_BRADY_PAV_DELAY_LOW: 400 MS
MDC_IDC_SET_BRADY_SAV_DELAY_HIGH: 220 MS
MDC_IDC_SET_BRADY_SAV_DELAY_LOW: 400 MS
MDC_IDC_SET_LEADCHNL_RA_PACING_AMPLITUDE: 2 V
MDC_IDC_SET_LEADCHNL_RA_PACING_CAPTURE_MODE: NORMAL
MDC_IDC_SET_LEADCHNL_RA_PACING_POLARITY: NORMAL
MDC_IDC_SET_LEADCHNL_RA_PACING_PULSEWIDTH: 0.5 MS
MDC_IDC_SET_LEADCHNL_RA_SENSING_ADAPTATION_MODE: NORMAL
MDC_IDC_SET_LEADCHNL_RA_SENSING_POLARITY: NORMAL
MDC_IDC_SET_LEADCHNL_RA_SENSING_SENSITIVITY: 0.25 MV
MDC_IDC_SET_LEADCHNL_RV_PACING_AMPLITUDE: 2.7 V
MDC_IDC_SET_LEADCHNL_RV_PACING_AMPLITUDE: 3.5 V
MDC_IDC_SET_LEADCHNL_RV_PACING_AMPLITUDE: 4.8 V
MDC_IDC_SET_LEADCHNL_RV_PACING_CAPTURE_MODE: NORMAL
MDC_IDC_SET_LEADCHNL_RV_PACING_POLARITY: NORMAL
MDC_IDC_SET_LEADCHNL_RV_PACING_PULSEWIDTH: 0.4 MS
MDC_IDC_SET_LEADCHNL_RV_SENSING_ADAPTATION_MODE: NORMAL
MDC_IDC_SET_LEADCHNL_RV_SENSING_POLARITY: NORMAL
MDC_IDC_SET_LEADCHNL_RV_SENSING_SENSITIVITY: 1.5 MV
MDC_IDC_SET_ZONE_DETECTION_INTERVAL: 375 MS
MDC_IDC_SET_ZONE_TYPE: NORMAL
MDC_IDC_SET_ZONE_VENDOR_TYPE: NORMAL
MDC_IDC_STAT_AT_BURDEN_PERCENT: 0 %
MDC_IDC_STAT_AT_BURDEN_PERCENT: 0 %
MDC_IDC_STAT_AT_DTM_END: NORMAL
MDC_IDC_STAT_AT_DTM_END: NORMAL
MDC_IDC_STAT_AT_DTM_START: NORMAL
MDC_IDC_STAT_AT_DTM_START: NORMAL
MDC_IDC_STAT_BRADY_DTM_END: NORMAL
MDC_IDC_STAT_BRADY_DTM_END: NORMAL
MDC_IDC_STAT_BRADY_DTM_START: NORMAL
MDC_IDC_STAT_BRADY_DTM_START: NORMAL
MDC_IDC_STAT_BRADY_RA_PERCENT_PACED: 99 %
MDC_IDC_STAT_BRADY_RA_PERCENT_PACED: 99 %
MDC_IDC_STAT_BRADY_RV_PERCENT_PACED: 6 %
MDC_IDC_STAT_BRADY_RV_PERCENT_PACED: 7 %
MDC_IDC_STAT_EPISODE_RECENT_COUNT: 0
MDC_IDC_STAT_EPISODE_RECENT_COUNT_DTM_END: NORMAL
MDC_IDC_STAT_EPISODE_RECENT_COUNT_DTM_START: NORMAL
MDC_IDC_STAT_EPISODE_TOTAL_COUNT: 0
MDC_IDC_STAT_EPISODE_TOTAL_COUNT_DTM_END: NORMAL
MDC_IDC_STAT_EPISODE_TYPE: NORMAL
MDC_IDC_STAT_EPISODE_VENDOR_TYPE: NORMAL
MONOCYTES # BLD AUTO: 0.5 10E9/L (ref 0–1.3)
MONOCYTES # BLD AUTO: 0.5 10E9/L (ref 0–1.3)
MONOCYTES # BLD AUTO: 0.6 10E9/L (ref 0–1.3)
MONOCYTES # BLD AUTO: 0.7 10E9/L (ref 0–1.3)
MONOCYTES # BLD AUTO: 0.8 10E9/L (ref 0–1.3)
MONOCYTES # BLD AUTO: 0.8 10E9/L (ref 0–1.3)
MONOCYTES NFR BLD AUTO: 11 %
MONOCYTES NFR BLD AUTO: 11.6 %
MONOCYTES NFR BLD AUTO: 12.9 %
MONOCYTES NFR BLD AUTO: 13.4 %
MONOCYTES NFR BLD AUTO: 2.7 %
MONOCYTES NFR BLD AUTO: 4 %
MUCOUS THREADS #/AREA URNS LPF: PRESENT /LPF
MUCOUS THREADS #/AREA URNS LPF: PRESENT /LPF
NEUTROPHILS # BLD AUTO: 13.2 10E9/L (ref 1.6–8.3)
NEUTROPHILS # BLD AUTO: 16 10E9/L (ref 1.6–8.3)
NEUTROPHILS # BLD AUTO: 2.6 10E9/L (ref 1.6–8.3)
NEUTROPHILS # BLD AUTO: 4.5 10E9/L (ref 1.6–8.3)
NEUTROPHILS # BLD AUTO: 4.8 10E9/L (ref 1.6–8.3)
NEUTROPHILS # BLD AUTO: 5.6 10E9/L (ref 1.6–8.3)
NEUTROPHILS NFR BLD AUTO: 64.3 %
NEUTROPHILS NFR BLD AUTO: 75 %
NEUTROPHILS NFR BLD AUTO: 78.2 %
NEUTROPHILS NFR BLD AUTO: 80.9 %
NEUTROPHILS NFR BLD AUTO: 90.4 %
NEUTROPHILS NFR BLD AUTO: 93.6 %
NITRATE UR QL: NEGATIVE
NITRATE UR QL: NEGATIVE
NRBC # BLD AUTO: 0 10*3/UL
NRBC BLD AUTO-RTO: 0 /100
NT-PROBNP SERPL-MCNC: 2297 PG/ML (ref 0–1800)
NT-PROBNP SERPL-MCNC: 575 PG/ML (ref 0–1800)
PH UR STRIP: 5.5 PH (ref 5–7)
PH UR STRIP: 5.5 PH (ref 5–7)
PLATELET # BLD AUTO: 112 10E9/L (ref 150–450)
PLATELET # BLD AUTO: 125 10E9/L (ref 150–450)
PLATELET # BLD AUTO: 128 10E9/L (ref 150–450)
PLATELET # BLD AUTO: 140 10E9/L (ref 150–450)
PLATELET # BLD AUTO: 160 10E9/L (ref 150–450)
PLATELET # BLD AUTO: 172 10E9/L (ref 150–450)
PLATELET # BLD AUTO: 174 10E9/L (ref 150–450)
PLATELET # BLD AUTO: 196 10E9/L (ref 150–450)
PLATELET # BLD AUTO: 217 10E9/L (ref 150–450)
POTASSIUM SERPL-SCNC: 3.8 MMOL/L (ref 3.4–5.3)
POTASSIUM SERPL-SCNC: 3.8 MMOL/L (ref 3.4–5.3)
POTASSIUM SERPL-SCNC: 4.1 MMOL/L (ref 3.4–5.3)
POTASSIUM SERPL-SCNC: 4.1 MMOL/L (ref 3.4–5.3)
POTASSIUM SERPL-SCNC: 4.3 MMOL/L (ref 3.4–5.3)
POTASSIUM SERPL-SCNC: 4.3 MMOL/L (ref 3.4–5.3)
POTASSIUM SERPL-SCNC: 4.6 MMOL/L (ref 3.4–5.3)
PROCALCITONIN SERPL-MCNC: 0.14 NG/ML
PROCALCITONIN SERPL-MCNC: <0.05 NG/ML
PROCALCITONIN SERPL-MCNC: <0.05 NG/ML
PROT SERPL-MCNC: 6 G/DL (ref 6.8–8.8)
PROT SERPL-MCNC: 6.2 G/DL (ref 6.8–8.8)
PROT SERPL-MCNC: 6.3 G/DL (ref 6.8–8.8)
PROT SERPL-MCNC: 6.4 G/DL (ref 6.8–8.8)
PROT SERPL-MCNC: 6.9 G/DL (ref 6.8–8.8)
RBC # BLD AUTO: 3.93 10E12/L (ref 4.4–5.9)
RBC # BLD AUTO: 3.93 10E12/L (ref 4.4–5.9)
RBC # BLD AUTO: 3.97 10E12/L (ref 4.4–5.9)
RBC # BLD AUTO: 3.98 10E12/L (ref 4.4–5.9)
RBC # BLD AUTO: 4.04 10E12/L (ref 4.4–5.9)
RBC # BLD AUTO: 4.19 10E12/L (ref 4.4–5.9)
RBC # BLD AUTO: 4.2 10E12/L (ref 4.4–5.9)
RBC # BLD AUTO: 4.23 10E12/L (ref 4.4–5.9)
RBC # BLD AUTO: 4.35 10E12/L (ref 4.4–5.9)
RBC #/AREA URNS AUTO: 0 /HPF (ref 0–2)
RBC #/AREA URNS AUTO: 1 /HPF (ref 0–2)
RETICS # AUTO: 23.5 10E9/L (ref 25–95)
RETICS # AUTO: 24.2 10E9/L (ref 25–95)
RETICS # AUTO: 26 10E9/L (ref 25–95)
RETICS # AUTO: 27.4 10E9/L (ref 25–95)
RETICS # AUTO: 27.5 10E9/L (ref 25–95)
RETICS/RBC NFR AUTO: 0.6 % (ref 0.5–2)
RETICS/RBC NFR AUTO: 0.7 % (ref 0.5–2)
RSV RNA SPEC NAA+PROBE: NEGATIVE
SARS-COV-2 RNA SPEC QL NAA+PROBE: NORMAL
SARS-COV-2 RNA SPEC QL NAA+PROBE: POSITIVE
SODIUM SERPL-SCNC: 131 MMOL/L (ref 133–144)
SODIUM SERPL-SCNC: 132 MMOL/L (ref 133–144)
SODIUM SERPL-SCNC: 133 MMOL/L (ref 133–144)
SODIUM SERPL-SCNC: 134 MMOL/L (ref 133–144)
SODIUM SERPL-SCNC: 134 MMOL/L (ref 133–144)
SODIUM SERPL-SCNC: 136 MMOL/L (ref 133–144)
SODIUM SERPL-SCNC: 137 MMOL/L (ref 133–144)
SOURCE: ABNORMAL
SOURCE: ABNORMAL
SP GR UR STRIP: 1.02 (ref 1–1.03)
SP GR UR STRIP: 1.03 (ref 1–1.03)
SPECIMEN SOURCE: ABNORMAL
SPECIMEN SOURCE: NORMAL
SQUAMOUS #/AREA URNS AUTO: <1 /HPF (ref 0–1)
TROPONIN I SERPL-MCNC: 0.01 UG/L (ref 0–0.04)
TROPONIN I SERPL-MCNC: 0.02 UG/L (ref 0–0.04)
TROPONIN I SERPL-MCNC: 0.03 UG/L (ref 0–0.04)
TROPONIN I SERPL-MCNC: <0.015 UG/L (ref 0–0.04)
UROBILINOGEN UR STRIP-MCNC: 2 MG/DL (ref 0–2)
UROBILINOGEN UR STRIP-MCNC: NORMAL MG/DL (ref 0–2)
VIT B12 SERPL-MCNC: 358 PG/ML (ref 193–986)
WBC # BLD AUTO: 14.6 10E9/L (ref 4–11)
WBC # BLD AUTO: 17 10E9/L (ref 4–11)
WBC # BLD AUTO: 18.3 10E9/L (ref 4–11)
WBC # BLD AUTO: 20.8 10E9/L (ref 4–11)
WBC # BLD AUTO: 4 10E9/L (ref 4–11)
WBC # BLD AUTO: 4.7 10E9/L (ref 4–11)
WBC # BLD AUTO: 6 10E9/L (ref 4–11)
WBC # BLD AUTO: 6.2 10E9/L (ref 4–11)
WBC # BLD AUTO: 6.9 10E9/L (ref 4–11)
WBC #/AREA URNS AUTO: 0 /HPF (ref 0–5)
WBC #/AREA URNS AUTO: <1 /HPF (ref 0–5)

## 2020-01-01 PROCEDURE — 85730 THROMBOPLASTIN TIME PARTIAL: CPT | Performed by: HOSPITALIST

## 2020-01-01 PROCEDURE — 120N000001 HC R&B MED SURG/OB

## 2020-01-01 PROCEDURE — 80053 COMPREHEN METABOLIC PANEL: CPT | Performed by: HOSPITALIST

## 2020-01-01 PROCEDURE — 99232 SBSQ HOSP IP/OBS MODERATE 35: CPT | Performed by: HOSPITALIST

## 2020-01-01 PROCEDURE — 250N000013 HC RX MED GY IP 250 OP 250 PS 637: Performed by: INTERNAL MEDICINE

## 2020-01-01 PROCEDURE — 99214 OFFICE O/P EST MOD 30 MIN: CPT | Mod: 95 | Performed by: INTERNAL MEDICINE

## 2020-01-01 PROCEDURE — 83605 ASSAY OF LACTIC ACID: CPT | Performed by: EMERGENCY MEDICINE

## 2020-01-01 PROCEDURE — 83615 LACTATE (LD) (LDH) ENZYME: CPT | Performed by: HOSPITALIST

## 2020-01-01 PROCEDURE — 36415 COLL VENOUS BLD VENIPUNCTURE: CPT | Performed by: INTERNAL MEDICINE

## 2020-01-01 PROCEDURE — 36415 COLL VENOUS BLD VENIPUNCTURE: CPT | Performed by: HOSPITALIST

## 2020-01-01 PROCEDURE — 80053 COMPREHEN METABOLIC PANEL: CPT | Performed by: INTERNAL MEDICINE

## 2020-01-01 PROCEDURE — 84484 ASSAY OF TROPONIN QUANT: CPT | Performed by: HOSPITALIST

## 2020-01-01 PROCEDURE — 85025 COMPLETE CBC W/AUTO DIFF WBC: CPT | Performed by: HOSPITALIST

## 2020-01-01 PROCEDURE — 258N000003 HC RX IP 258 OP 636: Performed by: HOSPITALIST

## 2020-01-01 PROCEDURE — 80076 HEPATIC FUNCTION PANEL: CPT | Performed by: HOSPITALIST

## 2020-01-01 PROCEDURE — 85379 FIBRIN DEGRADATION QUANT: CPT | Performed by: HOSPITALIST

## 2020-01-01 PROCEDURE — 86140 C-REACTIVE PROTEIN: CPT | Performed by: HOSPITALIST

## 2020-01-01 PROCEDURE — 999N001017 HC STATISTIC GLUCOSE BY METER IP

## 2020-01-01 PROCEDURE — 86140 C-REACTIVE PROTEIN: CPT | Performed by: INTERNAL MEDICINE

## 2020-01-01 PROCEDURE — 250N000011 HC RX IP 250 OP 636: Performed by: HOSPITALIST

## 2020-01-01 PROCEDURE — 97530 THERAPEUTIC ACTIVITIES: CPT | Mod: GP | Performed by: PHYSICAL THERAPIST

## 2020-01-01 PROCEDURE — 97116 GAIT TRAINING THERAPY: CPT | Mod: GP | Performed by: PHYSICAL THERAPIST

## 2020-01-01 PROCEDURE — 85610 PROTHROMBIN TIME: CPT | Performed by: HOSPITALIST

## 2020-01-01 PROCEDURE — 85025 COMPLETE CBC W/AUTO DIFF WBC: CPT | Performed by: INTERNAL MEDICINE

## 2020-01-01 PROCEDURE — 93294 REM INTERROG EVL PM/LDLS PM: CPT | Performed by: INTERNAL MEDICINE

## 2020-01-01 PROCEDURE — 83880 ASSAY OF NATRIURETIC PEPTIDE: CPT | Performed by: INTERNAL MEDICINE

## 2020-01-01 PROCEDURE — 85045 AUTOMATED RETICULOCYTE COUNT: CPT | Performed by: HOSPITALIST

## 2020-01-01 PROCEDURE — 85384 FIBRINOGEN ACTIVITY: CPT | Performed by: HOSPITALIST

## 2020-01-01 PROCEDURE — 82607 VITAMIN B-12: CPT | Performed by: INTERNAL MEDICINE

## 2020-01-01 PROCEDURE — 85027 COMPLETE CBC AUTOMATED: CPT | Performed by: HOSPITALIST

## 2020-01-01 PROCEDURE — 258N000003 HC RX IP 258 OP 636: Performed by: INTERNAL MEDICINE

## 2020-01-01 PROCEDURE — 99233 SBSQ HOSP IP/OBS HIGH 50: CPT | Performed by: INTERNAL MEDICINE

## 2020-01-01 PROCEDURE — 97530 THERAPEUTIC ACTIVITIES: CPT | Mod: GP

## 2020-01-01 PROCEDURE — 82746 ASSAY OF FOLIC ACID SERUM: CPT | Performed by: INTERNAL MEDICINE

## 2020-01-01 PROCEDURE — 80048 BASIC METABOLIC PNL TOTAL CA: CPT | Performed by: HOSPITALIST

## 2020-01-01 PROCEDURE — 71045 X-RAY EXAM CHEST 1 VIEW: CPT

## 2020-01-01 PROCEDURE — 999N000147 HC STATISTIC PT IP EVAL DEFER

## 2020-01-01 PROCEDURE — 250N000013 HC RX MED GY IP 250 OP 250 PS 637: Performed by: HOSPITALIST

## 2020-01-01 PROCEDURE — 87040 BLOOD CULTURE FOR BACTERIA: CPT | Performed by: EMERGENCY MEDICINE

## 2020-01-01 PROCEDURE — 87631 RESP VIRUS 3-5 TARGETS: CPT | Performed by: INTERNAL MEDICINE

## 2020-01-01 PROCEDURE — 99233 SBSQ HOSP IP/OBS HIGH 50: CPT | Performed by: HOSPITALIST

## 2020-01-01 PROCEDURE — 80053 COMPREHEN METABOLIC PANEL: CPT | Performed by: EMERGENCY MEDICINE

## 2020-01-01 PROCEDURE — 258N000003 HC RX IP 258 OP 636: Performed by: EMERGENCY MEDICINE

## 2020-01-01 PROCEDURE — 84145 PROCALCITONIN (PCT): CPT | Performed by: INTERNAL MEDICINE

## 2020-01-01 PROCEDURE — 999N000157 HC STATISTIC RCP TIME EA 10 MIN

## 2020-01-01 PROCEDURE — 97161 PT EVAL LOW COMPLEX 20 MIN: CPT | Mod: GP

## 2020-01-01 PROCEDURE — 97110 THERAPEUTIC EXERCISES: CPT | Mod: GP | Performed by: PHYSICAL THERAPIST

## 2020-01-01 PROCEDURE — 99223 1ST HOSP IP/OBS HIGH 75: CPT | Mod: AI | Performed by: INTERNAL MEDICINE

## 2020-01-01 PROCEDURE — 99207 PR CDG-HISTORY COMPONENT: MEETS DETAILED - UP CODED: CPT | Performed by: HOSPITALIST

## 2020-01-01 PROCEDURE — 250N000009 HC RX 250: Performed by: HOSPITALIST

## 2020-01-01 PROCEDURE — 99232 SBSQ HOSP IP/OBS MODERATE 35: CPT | Performed by: INTERNAL MEDICINE

## 2020-01-01 PROCEDURE — 99207 PR CDG-HISTORY COMPONENT: MEETS DETAILED - UP CODED: CPT | Performed by: INTERNAL MEDICINE

## 2020-01-01 PROCEDURE — 82728 ASSAY OF FERRITIN: CPT | Performed by: HOSPITALIST

## 2020-01-01 PROCEDURE — C9803 HOPD COVID-19 SPEC COLLECT: HCPCS

## 2020-01-01 PROCEDURE — 250N000013 HC RX MED GY IP 250 OP 250 PS 637: Performed by: STUDENT IN AN ORGANIZED HEALTH CARE EDUCATION/TRAINING PROGRAM

## 2020-01-01 PROCEDURE — 93296 REM INTERROG EVL PM/IDS: CPT | Performed by: INTERNAL MEDICINE

## 2020-01-01 PROCEDURE — XW033E5 INTRODUCTION OF REMDESIVIR ANTI-INFECTIVE INTO PERIPHERAL VEIN, PERCUTANEOUS APPROACH, NEW TECHNOLOGY GROUP 5: ICD-10-PCS | Performed by: HOSPITALIST

## 2020-01-01 PROCEDURE — 82550 ASSAY OF CK (CPK): CPT | Performed by: HOSPITALIST

## 2020-01-01 PROCEDURE — 85025 COMPLETE CBC W/AUTO DIFF WBC: CPT | Performed by: EMERGENCY MEDICINE

## 2020-01-01 PROCEDURE — 96361 HYDRATE IV INFUSION ADD-ON: CPT

## 2020-01-01 PROCEDURE — 250N000011 HC RX IP 250 OP 636: Performed by: INTERNAL MEDICINE

## 2020-01-01 PROCEDURE — 96360 HYDRATION IV INFUSION INIT: CPT

## 2020-01-01 PROCEDURE — 85652 RBC SED RATE AUTOMATED: CPT | Performed by: HOSPITALIST

## 2020-01-01 PROCEDURE — 85379 FIBRIN DEGRADATION QUANT: CPT | Performed by: INTERNAL MEDICINE

## 2020-01-01 PROCEDURE — 999N000215 HC STATISTIC HFNC ADULT NON-CPAP

## 2020-01-01 PROCEDURE — 70450 CT HEAD/BRAIN W/O DYE: CPT

## 2020-01-01 PROCEDURE — 83615 LACTATE (LD) (LDH) ENZYME: CPT | Performed by: INTERNAL MEDICINE

## 2020-01-01 PROCEDURE — U0003 INFECTIOUS AGENT DETECTION BY NUCLEIC ACID (DNA OR RNA); SEVERE ACUTE RESPIRATORY SYNDROME CORONAVIRUS 2 (SARS-COV-2) (CORONAVIRUS DISEASE [COVID-19]), AMPLIFIED PROBE TECHNIQUE, MAKING USE OF HIGH THROUGHPUT TECHNOLOGIES AS DESCRIBED BY CMS-2020-01-R: HCPCS | Performed by: EMERGENCY MEDICINE

## 2020-01-01 PROCEDURE — 83605 ASSAY OF LACTIC ACID: CPT | Performed by: INTERNAL MEDICINE

## 2020-01-01 PROCEDURE — 99238 HOSP IP/OBS DSCHRG MGMT 30/<: CPT | Performed by: NURSE PRACTITIONER

## 2020-01-01 PROCEDURE — 81001 URINALYSIS AUTO W/SCOPE: CPT | Performed by: EMERGENCY MEDICINE

## 2020-01-01 PROCEDURE — 81001 URINALYSIS AUTO W/SCOPE: CPT | Performed by: HOSPITALIST

## 2020-01-01 PROCEDURE — 85027 COMPLETE CBC AUTOMATED: CPT | Performed by: INTERNAL MEDICINE

## 2020-01-01 PROCEDURE — 84484 ASSAY OF TROPONIN QUANT: CPT | Performed by: EMERGENCY MEDICINE

## 2020-01-01 PROCEDURE — 84145 PROCALCITONIN (PCT): CPT | Performed by: HOSPITALIST

## 2020-01-01 PROCEDURE — 84484 ASSAY OF TROPONIN QUANT: CPT | Performed by: INTERNAL MEDICINE

## 2020-01-01 PROCEDURE — 97110 THERAPEUTIC EXERCISES: CPT | Mod: GP

## 2020-01-01 PROCEDURE — 99285 EMERGENCY DEPT VISIT HI MDM: CPT | Mod: 25

## 2020-01-01 PROCEDURE — 93280 PM DEVICE PROGR EVAL DUAL: CPT | Performed by: INTERNAL MEDICINE

## 2020-01-01 RX ORDER — ACETAMINOPHEN 325 MG/1
650 TABLET ORAL EVERY 4 HOURS PRN
Status: DISCONTINUED | OUTPATIENT
Start: 2020-01-01 | End: 2020-11-08 | Stop reason: HOSPADM

## 2020-01-01 RX ORDER — FUROSEMIDE 10 MG/ML
20 INJECTION INTRAMUSCULAR; INTRAVENOUS ONCE
Status: COMPLETED | OUTPATIENT
Start: 2020-01-01 | End: 2020-01-01

## 2020-01-01 RX ORDER — EZETIMIBE 10 MG/1
10 TABLET ORAL DAILY
Status: DISCONTINUED | OUTPATIENT
Start: 2020-01-01 | End: 2020-01-01

## 2020-01-01 RX ORDER — ROSUVASTATIN CALCIUM 20 MG/1
20 TABLET, COATED ORAL DAILY
Status: DISCONTINUED | OUTPATIENT
Start: 2020-01-01 | End: 2020-01-01

## 2020-01-01 RX ORDER — METOPROLOL SUCCINATE 50 MG/1
50 TABLET, EXTENDED RELEASE ORAL DAILY
Qty: 90 TABLET | Refills: 1 | Status: SHIPPED | OUTPATIENT
Start: 2020-01-01 | End: 2020-01-01

## 2020-01-01 RX ORDER — METOPROLOL SUCCINATE 50 MG/1
50 TABLET, EXTENDED RELEASE ORAL DAILY
Qty: 90 TABLET | Refills: 1 | Status: SHIPPED | OUTPATIENT
Start: 2020-01-01

## 2020-01-01 RX ORDER — FLUTICASONE PROPIONATE 50 MCG
1 SPRAY, SUSPENSION (ML) NASAL DAILY
Status: DISCONTINUED | OUTPATIENT
Start: 2020-01-01 | End: 2020-01-01

## 2020-01-01 RX ORDER — ROSUVASTATIN CALCIUM 20 MG/1
20 TABLET, COATED ORAL DAILY
Qty: 90 TABLET | Refills: 0 | Status: SHIPPED | OUTPATIENT
Start: 2020-01-01 | End: 2020-01-01

## 2020-01-01 RX ORDER — ACETAMINOPHEN 500 MG
1000 TABLET ORAL ONCE
Status: DISCONTINUED | OUTPATIENT
Start: 2020-01-01 | End: 2020-01-01

## 2020-01-01 RX ORDER — CARBOXYMETHYLCELLULOSE SODIUM 5 MG/ML
1-2 SOLUTION/ DROPS OPHTHALMIC EVERY 8 HOURS PRN
Status: DISCONTINUED | OUTPATIENT
Start: 2020-01-01 | End: 2020-11-08 | Stop reason: HOSPADM

## 2020-01-01 RX ORDER — POLYETHYLENE GLYCOL 3350 17 G/17G
17 POWDER, FOR SOLUTION ORAL DAILY
Status: DISCONTINUED | OUTPATIENT
Start: 2020-01-01 | End: 2020-01-01

## 2020-01-01 RX ORDER — PIPERACILLIN SODIUM, TAZOBACTAM SODIUM 4; .5 G/20ML; G/20ML
4.5 INJECTION, POWDER, LYOPHILIZED, FOR SOLUTION INTRAVENOUS EVERY 6 HOURS
Status: DISCONTINUED | OUTPATIENT
Start: 2020-01-01 | End: 2020-01-01

## 2020-01-01 RX ORDER — LORAZEPAM 0.5 MG/1
.5-1 TABLET ORAL
Status: DISCONTINUED | OUTPATIENT
Start: 2020-01-01 | End: 2020-11-08 | Stop reason: HOSPADM

## 2020-01-01 RX ORDER — ONDANSETRON 2 MG/ML
4 INJECTION INTRAMUSCULAR; INTRAVENOUS EVERY 6 HOURS PRN
Status: DISCONTINUED | OUTPATIENT
Start: 2020-01-01 | End: 2020-11-08 | Stop reason: HOSPADM

## 2020-01-01 RX ORDER — MULTIPLE VITAMINS W/ MINERALS TAB 9MG-400MCG
1 TAB ORAL DAILY
Status: DISCONTINUED | OUTPATIENT
Start: 2020-01-01 | End: 2020-01-01

## 2020-01-01 RX ORDER — ROSUVASTATIN CALCIUM 20 MG/1
20 TABLET, COATED ORAL DAILY
Qty: 90 TABLET | Refills: 3 | Status: SHIPPED | OUTPATIENT
Start: 2020-01-01

## 2020-01-01 RX ORDER — AMOXICILLIN 250 MG
2 CAPSULE ORAL 2 TIMES DAILY
Status: DISCONTINUED | OUTPATIENT
Start: 2020-01-01 | End: 2020-01-01

## 2020-01-01 RX ORDER — HYDROMORPHONE HYDROCHLORIDE 1 MG/ML
1-2 SOLUTION ORAL
Status: DISCONTINUED | OUTPATIENT
Start: 2020-01-01 | End: 2020-11-08 | Stop reason: HOSPADM

## 2020-01-01 RX ORDER — ACETAMINOPHEN 650 MG/1
650 SUPPOSITORY RECTAL EVERY 4 HOURS PRN
Status: DISCONTINUED | OUTPATIENT
Start: 2020-01-01 | End: 2020-11-08 | Stop reason: HOSPADM

## 2020-01-01 RX ORDER — LORAZEPAM 2 MG/ML
.5-1 INJECTION INTRAMUSCULAR
Status: DISCONTINUED | OUTPATIENT
Start: 2020-01-01 | End: 2020-11-08 | Stop reason: HOSPADM

## 2020-01-01 RX ORDER — AMOXICILLIN 250 MG
1 CAPSULE ORAL 2 TIMES DAILY
Status: DISCONTINUED | OUTPATIENT
Start: 2020-01-01 | End: 2020-01-01

## 2020-01-01 RX ORDER — ACETAMINOPHEN 650 MG/1
650 SUPPOSITORY RECTAL EVERY 4 HOURS PRN
Status: DISCONTINUED | OUTPATIENT
Start: 2020-01-01 | End: 2020-01-01

## 2020-01-01 RX ORDER — FLUTICASONE PROPIONATE 50 MCG
1 SPRAY, SUSPENSION (ML) NASAL DAILY
COMMUNITY

## 2020-01-01 RX ORDER — ONDANSETRON 4 MG/1
4 TABLET, ORALLY DISINTEGRATING ORAL EVERY 6 HOURS PRN
Status: DISCONTINUED | OUTPATIENT
Start: 2020-01-01 | End: 2020-11-08 | Stop reason: HOSPADM

## 2020-01-01 RX ORDER — ACETAMINOPHEN 325 MG/1
650 TABLET ORAL EVERY 4 HOURS PRN
Status: DISCONTINUED | OUTPATIENT
Start: 2020-01-01 | End: 2020-01-01

## 2020-01-01 RX ORDER — LIDOCAINE 40 MG/G
CREAM TOPICAL
Status: DISCONTINUED | OUTPATIENT
Start: 2020-01-01 | End: 2020-11-08 | Stop reason: HOSPADM

## 2020-01-01 RX ORDER — AMIODARONE HYDROCHLORIDE 100 MG/1
100 TABLET ORAL DAILY
Qty: 90 TABLET | Refills: 0 | Status: SHIPPED | OUTPATIENT
Start: 2020-01-01

## 2020-01-01 RX ORDER — TAMSULOSIN HYDROCHLORIDE 0.4 MG/1
0.4 CAPSULE ORAL DAILY
Status: DISCONTINUED | OUTPATIENT
Start: 2020-01-01 | End: 2020-01-01

## 2020-01-01 RX ORDER — ATROPINE SULFATE 10 MG/ML
1-2 SOLUTION/ DROPS OPHTHALMIC
Status: DISCONTINUED | OUTPATIENT
Start: 2020-01-01 | End: 2020-11-08 | Stop reason: HOSPADM

## 2020-01-01 RX ORDER — METOPROLOL SUCCINATE 50 MG/1
50 TABLET, EXTENDED RELEASE ORAL DAILY
Status: DISCONTINUED | OUTPATIENT
Start: 2020-01-01 | End: 2020-01-01

## 2020-01-01 RX ORDER — NALOXONE HYDROCHLORIDE 0.4 MG/ML
.1-.4 INJECTION, SOLUTION INTRAMUSCULAR; INTRAVENOUS; SUBCUTANEOUS
Status: DISCONTINUED | OUTPATIENT
Start: 2020-01-01 | End: 2020-11-08 | Stop reason: HOSPADM

## 2020-01-01 RX ORDER — HYDROMORPHONE HYDROCHLORIDE 1 MG/ML
.3-.5 INJECTION, SOLUTION INTRAMUSCULAR; INTRAVENOUS; SUBCUTANEOUS
Status: DISCONTINUED | OUTPATIENT
Start: 2020-01-01 | End: 2020-11-08 | Stop reason: HOSPADM

## 2020-01-01 RX ORDER — AMIODARONE HYDROCHLORIDE 100 MG/1
100 TABLET ORAL DAILY
Qty: 90 TABLET | Refills: 0 | Status: SHIPPED | OUTPATIENT
Start: 2020-01-01 | End: 2020-01-01

## 2020-01-01 RX ORDER — LANOLIN ALCOHOL/MO/W.PET/CERES
3 CREAM (GRAM) TOPICAL AT BEDTIME
Status: DISCONTINUED | OUTPATIENT
Start: 2020-01-01 | End: 2020-01-01

## 2020-01-01 RX ORDER — EZETIMIBE 10 MG/1
10 TABLET ORAL DAILY
Qty: 90 TABLET | Refills: 3 | Status: SHIPPED | OUTPATIENT
Start: 2020-01-01

## 2020-01-01 RX ADMIN — TAMSULOSIN HYDROCHLORIDE 0.4 MG: 0.4 CAPSULE ORAL at 08:26

## 2020-01-01 RX ADMIN — DOCUSATE SODIUM 50 MG AND SENNOSIDES 8.6 MG 2 TABLET: 8.6; 5 TABLET, FILM COATED ORAL at 08:53

## 2020-01-01 RX ADMIN — APIXABAN 5 MG: 5 TABLET, FILM COATED ORAL at 21:31

## 2020-01-01 RX ADMIN — APIXABAN 5 MG: 5 TABLET, FILM COATED ORAL at 08:25

## 2020-01-01 RX ADMIN — LORAZEPAM 1 MG: 2 INJECTION INTRAMUSCULAR; INTRAVENOUS at 06:17

## 2020-01-01 RX ADMIN — PIPERACILLIN SODIUM AND TAZOBACTAM SODIUM 4.5 G: 4; .5 INJECTION, POWDER, LYOPHILIZED, FOR SOLUTION INTRAVENOUS at 10:30

## 2020-01-01 RX ADMIN — PIPERACILLIN SODIUM AND TAZOBACTAM SODIUM 4.5 G: 4; .5 INJECTION, POWDER, LYOPHILIZED, FOR SOLUTION INTRAVENOUS at 03:48

## 2020-01-01 RX ADMIN — ROSUVASTATIN CALCIUM 20 MG: 20 TABLET, FILM COATED ORAL at 20:18

## 2020-01-01 RX ADMIN — SODIUM CHLORIDE 500 ML: 9 INJECTION, SOLUTION INTRAVENOUS at 11:44

## 2020-01-01 RX ADMIN — METOPROLOL SUCCINATE 50 MG: 50 TABLET, EXTENDED RELEASE ORAL at 10:29

## 2020-01-01 RX ADMIN — PIPERACILLIN SODIUM AND TAZOBACTAM SODIUM 4.5 G: 4; .5 INJECTION, POWDER, LYOPHILIZED, FOR SOLUTION INTRAVENOUS at 10:44

## 2020-01-01 RX ADMIN — POLYETHYLENE GLYCOL 3350 17 G: 17 POWDER, FOR SOLUTION ORAL at 10:28

## 2020-01-01 RX ADMIN — ACETAMINOPHEN 650 MG: 325 TABLET, FILM COATED ORAL at 04:14

## 2020-01-01 RX ADMIN — APIXABAN 5 MG: 5 TABLET, FILM COATED ORAL at 20:51

## 2020-01-01 RX ADMIN — PIPERACILLIN SODIUM AND TAZOBACTAM SODIUM 4.5 G: 4; .5 INJECTION, POWDER, LYOPHILIZED, FOR SOLUTION INTRAVENOUS at 03:20

## 2020-01-01 RX ADMIN — FUROSEMIDE 20 MG: 10 INJECTION, SOLUTION INTRAVENOUS at 14:04

## 2020-01-01 RX ADMIN — APIXABAN 5 MG: 5 TABLET, FILM COATED ORAL at 20:18

## 2020-01-01 RX ADMIN — AMIODARONE HYDROCHLORIDE 100 MG: 200 TABLET ORAL at 08:26

## 2020-01-01 RX ADMIN — DEXAMETHASONE SODIUM PHOSPHATE 6 MG: 4 INJECTION, SOLUTION INTRAMUSCULAR; INTRAVENOUS at 09:33

## 2020-01-01 RX ADMIN — LORAZEPAM 1 MG: 2 INJECTION INTRAMUSCULAR; INTRAVENOUS at 10:14

## 2020-01-01 RX ADMIN — APIXABAN 5 MG: 5 TABLET, FILM COATED ORAL at 20:59

## 2020-01-01 RX ADMIN — HYDROMORPHONE HYDROCHLORIDE 1 MG: 1 SOLUTION ORAL at 16:03

## 2020-01-01 RX ADMIN — DEXAMETHASONE SODIUM PHOSPHATE 6 MG: 4 INJECTION, SOLUTION INTRAMUSCULAR; INTRAVENOUS at 08:53

## 2020-01-01 RX ADMIN — AMIODARONE HYDROCHLORIDE 100 MG: 200 TABLET ORAL at 09:37

## 2020-01-01 RX ADMIN — MULTIPLE VITAMINS W/ MINERALS TAB 1 TABLET: TAB at 09:37

## 2020-01-01 RX ADMIN — FLUTICASONE PROPIONATE 1 SPRAY: 50 SPRAY, METERED NASAL at 08:53

## 2020-01-01 RX ADMIN — MELATONIN TAB 3 MG 3 MG: 3 TAB at 21:46

## 2020-01-01 RX ADMIN — EZETIMIBE 10 MG: 10 TABLET ORAL at 21:30

## 2020-01-01 RX ADMIN — EZETIMIBE 10 MG: 10 TABLET ORAL at 21:00

## 2020-01-01 RX ADMIN — EZETIMIBE 10 MG: 10 TABLET ORAL at 20:27

## 2020-01-01 RX ADMIN — METOPROLOL SUCCINATE 50 MG: 50 TABLET, EXTENDED RELEASE ORAL at 08:26

## 2020-01-01 RX ADMIN — FLUTICASONE PROPIONATE 1 SPRAY: 50 SPRAY, METERED NASAL at 09:42

## 2020-01-01 RX ADMIN — DOCUSATE SODIUM 50 MG AND SENNOSIDES 8.6 MG 2 TABLET: 8.6; 5 TABLET, FILM COATED ORAL at 08:26

## 2020-01-01 RX ADMIN — APIXABAN 5 MG: 5 TABLET, FILM COATED ORAL at 09:40

## 2020-01-01 RX ADMIN — QUETIAPINE 6.25 MG: 25 TABLET ORAL at 01:37

## 2020-01-01 RX ADMIN — AMIODARONE HYDROCHLORIDE 100 MG: 200 TABLET ORAL at 09:40

## 2020-01-01 RX ADMIN — ROSUVASTATIN CALCIUM 20 MG: 20 TABLET, FILM COATED ORAL at 20:51

## 2020-01-01 RX ADMIN — MULTIPLE VITAMINS W/ MINERALS TAB 1 TABLET: TAB at 17:25

## 2020-01-01 RX ADMIN — PIPERACILLIN SODIUM AND TAZOBACTAM SODIUM 4.5 G: 4; .5 INJECTION, POWDER, LYOPHILIZED, FOR SOLUTION INTRAVENOUS at 17:08

## 2020-01-01 RX ADMIN — SODIUM CHLORIDE 1000 ML: 9 INJECTION, SOLUTION INTRAVENOUS at 15:38

## 2020-01-01 RX ADMIN — AMIODARONE HYDROCHLORIDE 100 MG: 200 TABLET ORAL at 10:28

## 2020-01-01 RX ADMIN — APIXABAN 5 MG: 5 TABLET, FILM COATED ORAL at 09:15

## 2020-01-01 RX ADMIN — ACETAMINOPHEN 650 MG: 325 TABLET, FILM COATED ORAL at 01:50

## 2020-01-01 RX ADMIN — FLUTICASONE PROPIONATE 1 SPRAY: 50 SPRAY, METERED NASAL at 22:26

## 2020-01-01 RX ADMIN — AMIODARONE HYDROCHLORIDE 100 MG: 200 TABLET ORAL at 12:07

## 2020-01-01 RX ADMIN — METOPROLOL SUCCINATE 50 MG: 50 TABLET, EXTENDED RELEASE ORAL at 09:40

## 2020-01-01 RX ADMIN — SODIUM CHLORIDE 500 ML: 9 INJECTION, SOLUTION INTRAVENOUS at 14:58

## 2020-01-01 RX ADMIN — MULTIPLE VITAMINS W/ MINERALS TAB 1 TABLET: TAB at 07:50

## 2020-01-01 RX ADMIN — DOCUSATE SODIUM 50 MG AND SENNOSIDES 8.6 MG 1 TABLET: 8.6; 5 TABLET, FILM COATED ORAL at 22:25

## 2020-01-01 RX ADMIN — ROSUVASTATIN CALCIUM 20 MG: 20 TABLET, FILM COATED ORAL at 21:32

## 2020-01-01 RX ADMIN — TAMSULOSIN HYDROCHLORIDE 0.4 MG: 0.4 CAPSULE ORAL at 09:40

## 2020-01-01 RX ADMIN — LORAZEPAM 0.5 MG: 2 INJECTION INTRAMUSCULAR; INTRAVENOUS at 00:13

## 2020-01-01 RX ADMIN — DOCUSATE SODIUM 50 MG AND SENNOSIDES 8.6 MG 2 TABLET: 8.6; 5 TABLET, FILM COATED ORAL at 09:40

## 2020-01-01 RX ADMIN — DEXAMETHASONE SODIUM PHOSPHATE 6 MG: 4 INJECTION, SOLUTION INTRAMUSCULAR; INTRAVENOUS at 08:25

## 2020-01-01 RX ADMIN — HYDROMORPHONE HYDROCHLORIDE 1 MG: 1 SOLUTION ORAL at 20:12

## 2020-01-01 RX ADMIN — APIXABAN 5 MG: 5 TABLET, FILM COATED ORAL at 07:51

## 2020-01-01 RX ADMIN — REMDESIVIR 100 MG: 100 INJECTION, POWDER, LYOPHILIZED, FOR SOLUTION INTRAVENOUS at 11:30

## 2020-01-01 RX ADMIN — PIPERACILLIN SODIUM AND TAZOBACTAM SODIUM 4.5 G: 4; .5 INJECTION, POWDER, LYOPHILIZED, FOR SOLUTION INTRAVENOUS at 09:31

## 2020-01-01 RX ADMIN — DOCUSATE SODIUM 50 MG AND SENNOSIDES 8.6 MG 1 TABLET: 8.6; 5 TABLET, FILM COATED ORAL at 21:01

## 2020-01-01 RX ADMIN — METOPROLOL SUCCINATE 50 MG: 50 TABLET, EXTENDED RELEASE ORAL at 08:53

## 2020-01-01 RX ADMIN — APIXABAN 5 MG: 5 TABLET, FILM COATED ORAL at 22:25

## 2020-01-01 RX ADMIN — TAMSULOSIN HYDROCHLORIDE 0.4 MG: 0.4 CAPSULE ORAL at 08:25

## 2020-01-01 RX ADMIN — ROSUVASTATIN CALCIUM 20 MG: 20 TABLET, FILM COATED ORAL at 22:26

## 2020-01-01 RX ADMIN — LORAZEPAM 0.5 MG: 2 INJECTION INTRAMUSCULAR; INTRAVENOUS at 03:32

## 2020-01-01 RX ADMIN — AMIODARONE HYDROCHLORIDE 100 MG: 200 TABLET ORAL at 22:25

## 2020-01-01 RX ADMIN — DOCUSATE SODIUM 50 MG AND SENNOSIDES 8.6 MG 1 TABLET: 8.6; 5 TABLET, FILM COATED ORAL at 09:15

## 2020-01-01 RX ADMIN — MELATONIN TAB 3 MG 3 MG: 3 TAB at 22:16

## 2020-01-01 RX ADMIN — POLYETHYLENE GLYCOL 3350 17 G: 17 POWDER, FOR SOLUTION ORAL at 07:51

## 2020-01-01 RX ADMIN — POLYETHYLENE GLYCOL 3350 17 G: 17 POWDER, FOR SOLUTION ORAL at 09:41

## 2020-01-01 RX ADMIN — APIXABAN 5 MG: 5 TABLET, FILM COATED ORAL at 09:37

## 2020-01-01 RX ADMIN — APIXABAN 5 MG: 5 TABLET, FILM COATED ORAL at 08:26

## 2020-01-01 RX ADMIN — DOCUSATE SODIUM 50 MG AND SENNOSIDES 8.6 MG 2 TABLET: 8.6; 5 TABLET, FILM COATED ORAL at 21:31

## 2020-01-01 RX ADMIN — TAMSULOSIN HYDROCHLORIDE 0.4 MG: 0.4 CAPSULE ORAL at 10:33

## 2020-01-01 RX ADMIN — REMDESIVIR 200 MG: 100 INJECTION, POWDER, LYOPHILIZED, FOR SOLUTION INTRAVENOUS at 13:19

## 2020-01-01 RX ADMIN — DEXAMETHASONE SODIUM PHOSPHATE 6 MG: 4 INJECTION, SOLUTION INTRAMUSCULAR; INTRAVENOUS at 12:47

## 2020-01-01 RX ADMIN — ROSUVASTATIN CALCIUM 20 MG: 20 TABLET, FILM COATED ORAL at 21:46

## 2020-01-01 RX ADMIN — MELATONIN TAB 3 MG 3 MG: 3 TAB at 21:00

## 2020-01-01 RX ADMIN — TAMSULOSIN HYDROCHLORIDE 0.4 MG: 0.4 CAPSULE ORAL at 09:37

## 2020-01-01 RX ADMIN — QUETIAPINE 6.25 MG: 25 TABLET ORAL at 01:57

## 2020-01-01 RX ADMIN — TAMSULOSIN HYDROCHLORIDE 0.4 MG: 0.4 CAPSULE ORAL at 07:50

## 2020-01-01 RX ADMIN — AMIODARONE HYDROCHLORIDE 100 MG: 200 TABLET ORAL at 09:15

## 2020-01-01 RX ADMIN — POLYETHYLENE GLYCOL 3350 17 G: 17 POWDER, FOR SOLUTION ORAL at 22:26

## 2020-01-01 RX ADMIN — TAMSULOSIN HYDROCHLORIDE 0.4 MG: 0.4 CAPSULE ORAL at 22:25

## 2020-01-01 RX ADMIN — MULTIPLE VITAMINS W/ MINERALS TAB 1 TABLET: TAB at 08:26

## 2020-01-01 RX ADMIN — PIPERACILLIN SODIUM AND TAZOBACTAM SODIUM 4.5 G: 4; .5 INJECTION, POWDER, LYOPHILIZED, FOR SOLUTION INTRAVENOUS at 16:20

## 2020-01-01 RX ADMIN — TAMSULOSIN HYDROCHLORIDE 0.4 MG: 0.4 CAPSULE ORAL at 08:53

## 2020-01-01 RX ADMIN — MULTIPLE VITAMINS W/ MINERALS TAB 1 TABLET: TAB at 08:53

## 2020-01-01 RX ADMIN — PIPERACILLIN SODIUM AND TAZOBACTAM SODIUM 4.5 G: 4; .5 INJECTION, POWDER, LYOPHILIZED, FOR SOLUTION INTRAVENOUS at 22:15

## 2020-01-01 RX ADMIN — MULTIPLE VITAMINS W/ MINERALS TAB 1 TABLET: TAB at 09:40

## 2020-01-01 RX ADMIN — FLUTICASONE PROPIONATE 1 SPRAY: 50 SPRAY, METERED NASAL at 08:25

## 2020-01-01 RX ADMIN — LORAZEPAM 1 MG: 2 INJECTION INTRAMUSCULAR; INTRAVENOUS at 17:29

## 2020-01-01 RX ADMIN — FLUTICASONE PROPIONATE 1 SPRAY: 50 SPRAY, METERED NASAL at 08:31

## 2020-01-01 RX ADMIN — HYDROMORPHONE HYDROCHLORIDE 1 MG: 1 SOLUTION ORAL at 14:59

## 2020-01-01 RX ADMIN — PIPERACILLIN SODIUM AND TAZOBACTAM SODIUM 4.5 G: 4; .5 INJECTION, POWDER, LYOPHILIZED, FOR SOLUTION INTRAVENOUS at 17:38

## 2020-01-01 RX ADMIN — POLYETHYLENE GLYCOL 3350 17 G: 17 POWDER, FOR SOLUTION ORAL at 09:28

## 2020-01-01 RX ADMIN — EZETIMIBE 10 MG: 10 TABLET ORAL at 20:19

## 2020-01-01 RX ADMIN — EZETIMIBE 10 MG: 10 TABLET ORAL at 21:32

## 2020-01-01 RX ADMIN — ROSUVASTATIN CALCIUM 20 MG: 20 TABLET, FILM COATED ORAL at 21:31

## 2020-01-01 RX ADMIN — REMDESIVIR 100 MG: 100 INJECTION, POWDER, LYOPHILIZED, FOR SOLUTION INTRAVENOUS at 11:42

## 2020-01-01 RX ADMIN — EZETIMIBE 10 MG: 10 TABLET ORAL at 20:51

## 2020-01-01 RX ADMIN — APIXABAN 5 MG: 5 TABLET, FILM COATED ORAL at 21:45

## 2020-01-01 RX ADMIN — LORAZEPAM 1 MG: 2 INJECTION INTRAMUSCULAR; INTRAVENOUS at 14:12

## 2020-01-01 RX ADMIN — ACETAMINOPHEN 650 MG: 650 SUPPOSITORY RECTAL at 21:02

## 2020-01-01 RX ADMIN — PIPERACILLIN SODIUM AND TAZOBACTAM SODIUM 4.5 G: 4; .5 INJECTION, POWDER, LYOPHILIZED, FOR SOLUTION INTRAVENOUS at 21:45

## 2020-01-01 RX ADMIN — LORAZEPAM 0.5 MG: 2 INJECTION INTRAMUSCULAR; INTRAVENOUS at 13:26

## 2020-01-01 RX ADMIN — METOPROLOL SUCCINATE 50 MG: 50 TABLET, EXTENDED RELEASE ORAL at 09:37

## 2020-01-01 RX ADMIN — MULTIPLE VITAMINS W/ MINERALS TAB 1 TABLET: TAB at 09:15

## 2020-01-01 RX ADMIN — HYDROMORPHONE HYDROCHLORIDE 1 MG: 1 SOLUTION ORAL at 22:36

## 2020-01-01 RX ADMIN — LORAZEPAM 0.5 MG: 2 INJECTION INTRAMUSCULAR; INTRAVENOUS at 20:56

## 2020-01-01 RX ADMIN — REMDESIVIR 100 MG: 100 INJECTION, POWDER, LYOPHILIZED, FOR SOLUTION INTRAVENOUS at 12:05

## 2020-01-01 RX ADMIN — MELATONIN TAB 3 MG 3 MG: 3 TAB at 21:30

## 2020-01-01 RX ADMIN — APIXABAN 5 MG: 5 TABLET, FILM COATED ORAL at 08:53

## 2020-01-01 RX ADMIN — FLUTICASONE PROPIONATE 1 SPRAY: 50 SPRAY, METERED NASAL at 08:08

## 2020-01-01 RX ADMIN — ROSUVASTATIN CALCIUM 20 MG: 20 TABLET, FILM COATED ORAL at 21:01

## 2020-01-01 RX ADMIN — ACETAMINOPHEN 650 MG: 325 TABLET, FILM COATED ORAL at 21:04

## 2020-01-01 RX ADMIN — FLUTICASONE PROPIONATE 1 SPRAY: 50 SPRAY, METERED NASAL at 09:33

## 2020-01-01 RX ADMIN — FLUTICASONE PROPIONATE 1 SPRAY: 50 SPRAY, METERED NASAL at 10:34

## 2020-01-01 RX ADMIN — APIXABAN 5 MG: 5 TABLET, FILM COATED ORAL at 21:32

## 2020-01-01 RX ADMIN — TAMSULOSIN HYDROCHLORIDE 0.4 MG: 0.4 CAPSULE ORAL at 09:15

## 2020-01-01 RX ADMIN — AMIODARONE HYDROCHLORIDE 100 MG: 200 TABLET ORAL at 07:50

## 2020-01-01 RX ADMIN — ROSUVASTATIN CALCIUM 20 MG: 20 TABLET, FILM COATED ORAL at 21:00

## 2020-01-01 RX ADMIN — APIXABAN 5 MG: 5 TABLET, FILM COATED ORAL at 21:00

## 2020-01-01 RX ADMIN — REMDESIVIR 100 MG: 100 INJECTION, POWDER, LYOPHILIZED, FOR SOLUTION INTRAVENOUS at 10:17

## 2020-01-01 RX ADMIN — DOCUSATE SODIUM 50 MG AND SENNOSIDES 8.6 MG 2 TABLET: 8.6; 5 TABLET, FILM COATED ORAL at 10:28

## 2020-01-01 RX ADMIN — DEXAMETHASONE SODIUM PHOSPHATE 6 MG: 4 INJECTION, SOLUTION INTRAMUSCULAR; INTRAVENOUS at 10:29

## 2020-01-01 RX ADMIN — MELATONIN TAB 3 MG 3 MG: 3 TAB at 21:54

## 2020-01-01 RX ADMIN — PIPERACILLIN SODIUM AND TAZOBACTAM SODIUM 4.5 G: 4; .5 INJECTION, POWDER, LYOPHILIZED, FOR SOLUTION INTRAVENOUS at 04:29

## 2020-01-01 RX ADMIN — METOPROLOL SUCCINATE 50 MG: 50 TABLET, EXTENDED RELEASE ORAL at 22:26

## 2020-01-01 RX ADMIN — DEXAMETHASONE SODIUM PHOSPHATE 6 MG: 4 INJECTION, SOLUTION INTRAMUSCULAR; INTRAVENOUS at 08:31

## 2020-01-01 RX ADMIN — DEXAMETHASONE SODIUM PHOSPHATE 6 MG: 4 INJECTION, SOLUTION INTRAMUSCULAR; INTRAVENOUS at 09:22

## 2020-01-01 RX ADMIN — ROSUVASTATIN CALCIUM 20 MG: 20 TABLET, FILM COATED ORAL at 20:27

## 2020-01-01 RX ADMIN — SODIUM CHLORIDE 500 ML: 9 INJECTION, SOLUTION INTRAVENOUS at 10:01

## 2020-01-01 RX ADMIN — PIPERACILLIN SODIUM AND TAZOBACTAM SODIUM 4.5 G: 4; .5 INJECTION, POWDER, LYOPHILIZED, FOR SOLUTION INTRAVENOUS at 16:31

## 2020-01-01 RX ADMIN — DOCUSATE SODIUM 50 MG AND SENNOSIDES 8.6 MG 1 TABLET: 8.6; 5 TABLET, FILM COATED ORAL at 07:51

## 2020-01-01 RX ADMIN — APIXABAN 5 MG: 5 TABLET, FILM COATED ORAL at 10:28

## 2020-01-01 RX ADMIN — DOCUSATE SODIUM 50 MG AND SENNOSIDES 8.6 MG 1 TABLET: 8.6; 5 TABLET, FILM COATED ORAL at 21:00

## 2020-01-01 RX ADMIN — PIPERACILLIN SODIUM AND TAZOBACTAM SODIUM 4.5 G: 4; .5 INJECTION, POWDER, LYOPHILIZED, FOR SOLUTION INTRAVENOUS at 21:00

## 2020-01-01 RX ADMIN — PIPERACILLIN SODIUM AND TAZOBACTAM SODIUM 4.5 G: 4; .5 INJECTION, POWDER, LYOPHILIZED, FOR SOLUTION INTRAVENOUS at 21:07

## 2020-01-01 RX ADMIN — METOPROLOL SUCCINATE 50 MG: 50 TABLET, EXTENDED RELEASE ORAL at 07:50

## 2020-01-01 RX ADMIN — HYDROMORPHONE HYDROCHLORIDE 1 MG: 1 SOLUTION ORAL at 16:52

## 2020-01-01 RX ADMIN — EZETIMIBE 10 MG: 10 TABLET ORAL at 22:25

## 2020-01-01 RX ADMIN — MULTIPLE VITAMINS W/ MINERALS TAB 1 TABLET: TAB at 08:25

## 2020-01-01 RX ADMIN — LORAZEPAM 1 MG: 2 INJECTION INTRAMUSCULAR; INTRAVENOUS at 20:57

## 2020-01-01 RX ADMIN — PIPERACILLIN SODIUM AND TAZOBACTAM SODIUM 4.5 G: 4; .5 INJECTION, POWDER, LYOPHILIZED, FOR SOLUTION INTRAVENOUS at 04:34

## 2020-01-01 RX ADMIN — APIXABAN 5 MG: 5 TABLET, FILM COATED ORAL at 20:27

## 2020-01-01 RX ADMIN — AMIODARONE HYDROCHLORIDE 100 MG: 200 TABLET ORAL at 08:53

## 2020-01-01 RX ADMIN — POLYETHYLENE GLYCOL 3350 17 G: 17 POWDER, FOR SOLUTION ORAL at 08:26

## 2020-01-01 ASSESSMENT — ACTIVITIES OF DAILY LIVING (ADL)
ADLS_ACUITY_SCORE: 20
ADLS_ACUITY_SCORE: 21
ADLS_ACUITY_SCORE: 15
ADLS_ACUITY_SCORE: 20
ADLS_ACUITY_SCORE: 21
ADLS_ACUITY_SCORE: 20
ADLS_ACUITY_SCORE: 21
ADLS_ACUITY_SCORE: 21
ADLS_ACUITY_SCORE: 20
ADLS_ACUITY_SCORE: 21
ADLS_ACUITY_SCORE: 14
ADLS_ACUITY_SCORE: 14
ADLS_ACUITY_SCORE: 21
ADLS_ACUITY_SCORE: 20
ADLS_ACUITY_SCORE: 16
ADLS_ACUITY_SCORE: 21
ADLS_ACUITY_SCORE: 20
ADLS_ACUITY_SCORE: 16
ADLS_ACUITY_SCORE: 20
ADLS_ACUITY_SCORE: 20
ADLS_ACUITY_SCORE: 16
ADLS_ACUITY_SCORE: 20
ADLS_ACUITY_SCORE: 20
ADLS_ACUITY_SCORE: 21
ADLS_ACUITY_SCORE: 20
ADLS_ACUITY_SCORE: 21
ADLS_ACUITY_SCORE: 20
ADLS_ACUITY_SCORE: 16
ADLS_ACUITY_SCORE: 21
ADLS_ACUITY_SCORE: 21
ADLS_ACUITY_SCORE: 22
ADLS_ACUITY_SCORE: 21
ADLS_ACUITY_SCORE: 16
ADLS_ACUITY_SCORE: 20
ADLS_ACUITY_SCORE: 15
ADLS_ACUITY_SCORE: 22
ADLS_ACUITY_SCORE: 16
ADLS_ACUITY_SCORE: 20
ADLS_ACUITY_SCORE: 21
ADLS_ACUITY_SCORE: 15
ADLS_ACUITY_SCORE: 20
ADLS_ACUITY_SCORE: 14
ADLS_ACUITY_SCORE: 20
ADLS_ACUITY_SCORE: 14
ADLS_ACUITY_SCORE: 20
ADLS_ACUITY_SCORE: 16
ADLS_ACUITY_SCORE: 21
ADLS_ACUITY_SCORE: 21
ADLS_ACUITY_SCORE: 20

## 2020-01-01 ASSESSMENT — ENCOUNTER SYMPTOMS
MYALGIAS: 1
SHORTNESS OF BREATH: 1
COUGH: 1
ACTIVITY CHANGE: 1
WEAKNESS: 1

## 2020-01-01 ASSESSMENT — MIFFLIN-ST. JEOR: SCORE: 1409.22

## 2020-05-21 NOTE — PROGRESS NOTES
"Balta Drake is a 89 year old male who is being evaluated via a billable video visit.      The patient has been notified of following:     \"This video visit will be conducted via a call between you and your physician/provider. We have found that certain health care needs can be provided without the need for an in-person physical exam.  This service lets us provide the care you need with a video conversation.  If a prescription is necessary we can send it directly to your pharmacy.  If lab work is needed we can place an order for that and you can then stop by our lab to have the test done at a later time.    Video visits are billed at different rates depending on your insurance coverage.  Please reach out to your insurance provider with any questions.    If during the course of the call the physician/provider feels a video visit is not appropriate, you will not be charged for this service.\"    Patient has given verbal consent for Video visit? Yes    How would you like to obtain your AVS? Jazmin    Patient would like the video invitation sent by: Text to cell phone: [phone]    Will anyone else be joining your video visit? No         Review Of Systems  Skin: NEGATIVE  Eyes:Ears/Nose/Throat: hearing loss wears hearing aides  Respiratory: NEGATIVE  Cardiovascular:NEGATIVE  Gastrointestinal: NEGATIVE  Genitourinary:NEGATIVE  Musculoskeletal: NEGATIVE  Neurologic: occ tingling in legs  Psychiatric: untreated anxiety and depression  Hematologic/Lymphatic/Immunologic: NEGATIVE   Endocrine:  NEGATIVE  Vitals today are:  Weight 180.0 LB  DOX VIDEO VISIT-TEXT [phone]  Raine Cleary FISH    Video-Visit Details    General:  no apparent distress, normal body habitus, sitting upright.  ENT/Mouth:  membranes moist, no nasal discharge.  Normal head shape, no apparent injury or laceration.  Eyes:  no scleral icterus, normal conjunctivae.  No observed jaundice.  Neck:  no apparent neck swelling.   Chest/Lungs:  No " breathing difficulty while speaking.  No audible wheezing.  No cough during conversation.  Cardiovascular:  No obviously elevated jugular venous pressure.  No apparent edema bilaterally in LE.   Abdomen:  no obvious abdominal distention.   Extremities:  no apparent cyanosis.  Skin:  no xanthelasma.  No facial lacerations.  Neurologic:  Normal arm motion bilateral, no tremors.    Psychiatric:  Alert and oriented x3, calm demeanor    The rest of the comprehensive physical examination is deferred due to public health emergency video visit restrictions.      Type of service:  Video Visit francisco    Video Start Time: 12:28  Video End Time: 12:48    Originating Location (pt. Location): Home    Distant Location (provider location):  [facility]     Platform used for Video Visit: francisco Lyon MD, [facility] FRCPI

## 2020-05-21 NOTE — LETTER
5/21/2020    Edmundo Roblero MD  [address] [address]    RE: Balta Drake       Dear Colleague,    I had the pleasure of seeing Balta Drake in the [facility].    HISTORY OF PRESENT ILLNESS:  It is my pleasure to see your patient, Balta Drake, who in the past had been followed by Dr. Fran Galo, my former partner.  As you know, this is a patient who has a history of coronary artery disease with prior stenting of the ramus intermedius.  Subsequent coronary angiography, I believe, in 2014 showed that the ramus intermedius was occluded and collateralized.  There was moderate non-flow-limiting disease in the remainder of the coronary arteries.  This patient also has a history of symptomatic atrial fibrillation and is on chronic amiodarone therapy.  He has a history of sick sinus syndrome for which he had a permanent pacemaker implanted.  With that background in mind, the patient is doing well on video visit today.  He is not complaining of any chest pains, chest pressure or unusual shortness of breath.  He does tell me that he has a new diagnosis of depression and I did advise him to discuss that with you, as it is a noncardiac issue.  He has never had any problems with blood pressure but he has not had that checked recently.  Last interrogation of the pacemaker was on 03/02/2020 and this showed that there was no arrhythmia.  He is atrially pacing and ventricularly sensing.  There were 13 years left on his battery.  No atrial or ventricular arrhythmias were noted.      DIAGNOSTIC STUDIES:  His last lipid profile was in 09/2019.  His LDL was 62, HDL was 53 and triglycerides were 80.  Total cholesterol was 131.  His ALT was less than 5 at that stage.  With respect to potential amiodarone toxicities, his last pulmonary function testing was performed on 06/04/2019 which showed normal diffusion capacity and no evidence of  amiodarone-induced lung toxicity.  His last chest x-ray was 2017 which did not show any evidence of lung disease.      Last echocardiography was performed in 08/2019.  This showed that the patient's ejection fraction is in the low-normal range at 50%-55% with grade 1 diastolic dysfunction.  He had mild mitral regurgitation, trivial-to-mild tricuspid regurgitation and mild pulmonary regurgitation.  There was trivial-to-mild aortic regurgitation.  The ascending aorta was mildly dilated at 3.7 cm with aortic root being normal at 3.5 cm.      The patient does not feel any palpitations.      IMPRESSION:   1.  Coronary artery disease.  The patient is asymptomatic with respect to coronary artery disease with no symptoms of angina pectoris.   2.  Paroxysmal atrial fibrillation.  On interrogation of his permanent pacemaker in March, the patient was noted to be in sinus rhythm with no evidence of recurrent atrial fibrillation and no evidence of ventricular dysrhythmias.  The patient is maintained in sinus rhythm with amiodarone 100 mg per day and is anticoagulated with apixaban without bleeding issues.   3.  Excellent lipid profile last year.   4.  Borderline dilatation of the ascending aorta at 3.7 cm with normal-sized aortic root.   5.  Normally functioning permanent pacemaker with over 13 years left on the battery.      PLAN:  We will check thyroid function tests, liver function tests, pulmonary function tests and chest x-ray as well as a lipid profile in 6 months' time and hopefully, at that stage, the COVID-19 pandemic will have significantly improved so it is not so dangerous for the patient to come to our clinic.  I do not want him to come in at present when the cases are increasing.  I think it should be relatively safe to do that, as he has had no previous issues with thyroid function, hepatic function or lung function problems.  We will also obtain a chest x-ray at that particular time.  I will see him at that stage  unless, of course, the pandemic is still ongoing and then we will switch it to a video visit.        It has been my pleasure to be involved in the care of this extremely nice patient.  As always, he has been told to contact us if he has any questions or any concerns.      Thank you for allowing me to participate in the care of your patient.    Sincerely,     Waldo Lyon MD, MD     [facility]

## 2020-05-21 NOTE — PROGRESS NOTES
Service Date: 05/21/2020      CARDIOLOGY VIRTUAL FOLLOWUP VISIT      HISTORY OF PRESENT ILLNESS:  It is my pleasure to see your patient, Balta Drake, who in the past had been followed by Dr. Fran Galo, my former partner.  As you know, this is a patient who has a history of coronary artery disease with prior stenting of the ramus intermedius.  Subsequent coronary angiography, I believe, in 2014 showed that the ramus intermedius was occluded and collateralized.  There was moderate non-flow-limiting disease in the remainder of the coronary arteries.  This patient also has a history of symptomatic atrial fibrillation and is on chronic amiodarone therapy.  He has a history of sick sinus syndrome for which he had a permanent pacemaker implanted.  With that background in mind, the patient is doing well on video visit today.  He is not complaining of any chest pains, chest pressure or unusual shortness of breath.  He does tell me that he has a new diagnosis of depression and I did advise him to discuss that with you, as it is a noncardiac issue.  He has never had any problems with blood pressure but he has not had that checked recently.  Last interrogation of the pacemaker was on 03/02/2020 and this showed that there was no arrhythmia.  He is atrially pacing and ventricularly sensing.  There were 13 years left on his battery.  No atrial or ventricular arrhythmias were noted.      DIAGNOSTIC STUDIES:  His last lipid profile was in 09/2019.  His LDL was 62, HDL was 53 and triglycerides were 80.  Total cholesterol was 131.  His ALT was less than 5 at that stage.  With respect to potential amiodarone toxicities, his last pulmonary function testing was performed on 06/04/2019 which showed normal diffusion capacity and no evidence of amiodarone-induced lung toxicity.  His last chest x-ray was 2017 which did not show any evidence of lung disease.      Last echocardiography was performed in 08/2019.  This showed that the  patient's ejection fraction is in the low-normal range at 50%-55% with grade 1 diastolic dysfunction.  He had mild mitral regurgitation, trivial-to-mild tricuspid regurgitation and mild pulmonary regurgitation.  There was trivial-to-mild aortic regurgitation.  The ascending aorta was mildly dilated at 3.7 cm with aortic root being normal at 3.5 cm.      The patient does not feel any palpitations.      IMPRESSION:   1.  Coronary artery disease.  The patient is asymptomatic with respect to coronary artery disease with no symptoms of angina pectoris.   2.  Paroxysmal atrial fibrillation.  On interrogation of his permanent pacemaker in March, the patient was noted to be in sinus rhythm with no evidence of recurrent atrial fibrillation and no evidence of ventricular dysrhythmias.  The patient is maintained in sinus rhythm with amiodarone 100 mg per day and is anticoagulated with apixaban without bleeding issues.   3.  Excellent lipid profile last year.   4.  Borderline dilatation of the ascending aorta at 3.7 cm with normal-sized aortic root.   5.  Normally functioning permanent pacemaker with over 13 years left on the battery.      PLAN:  We will check thyroid function tests, liver function tests, pulmonary function tests and chest x-ray as well as a lipid profile in 6 months' time and hopefully, at that stage, the COVID-19 pandemic will have significantly improved so it is not so dangerous for the patient to come to our clinic.  I do not want him to come in at present when the cases are increasing.  I think it should be relatively safe to do that, as he has had no previous issues with thyroid function, hepatic function or lung function problems.  We will also obtain a chest x-ray at that particular time.  I will see him at that stage unless, of course, the pandemic is still ongoing and then we will switch it to a video visit.        It has been my pleasure to be involved in the care of this extremely nice patient.  As  always, he has been told to contact us if he has any questions or any concerns.      cc:   [place] [address]         MITZI MOJICA MD, [facility]             D: 2020   T: 2020   MT: FRANKIE      Name:     VOLODYMYR GONZALES   MRN:      [id number]        Account:      [id number]   :      10/23/1927           Service Date: 2020      Document: [id number]

## 2020-10-28 PROBLEM — M62.81 GENERALIZED MUSCLE WEAKNESS: Status: ACTIVE | Noted: 2020-01-01

## 2020-10-28 PROBLEM — U07.1 CLINICAL DIAGNOSIS OF COVID-19: Status: ACTIVE | Noted: 2020-01-01

## 2020-10-28 NOTE — ED PROVIDER NOTES
History   Chief Complaint  Cough and Shortness of Breath    HPI   Balta Drake is a 89 year old male with a history of anticoagulation on Eliquis, atrial fibrillation, aortic root dilation, hypertension, MI, and hyperlipidemia, among others, who lives with his daughter and presents via EMS for evaluation of cough and shortness of breath. The patient reports that he has been feeling generally unwell for the past two days. These symptoms include a cough, increasingly worsening shortness of breath, weakness, myalgias, and a decreased appetite. With the ongoing COVID-19 pandemic, the patient decided to get a COVID test that day and the results are still pending. Today, the patient continued to feel worse. As a result of his weakness, the patient fell twice; however, he did not hit his head. EMS was called as a result of this and he was brought to the ED for evaluation. Upon arrival, the patient O2 sats were 91% on room air. He denies any injuries and is only complaining of his COVID like symptoms mentioned above.     Allergies  No Known Allergies    Medications  Pacerone  Eliquis  Celebrex  Zetia  Toprol-XL  Crestor  Senokot  Flomax    Past Medical History  Atrial fibrillation  Anal fissure  Aortic root dilation  ASCVD  Cardiomyopathy  Diverticulitis  Hyperlipidemia  Hypertension  MI  NSVT  PMR  Spinal stenosis  Osteoarthritis    Past Surgical History  Appendectomy  Arthroplasty hip (left)  Tonsillectomy  EP Per Pacer Double lead  Graft bone from iliac crest  HCHG stent  Heart cath coronary angiogram  Pacemaker implant  Laminectomy, fusion lumbar one level    Family History  COPD  Heart disease  Lung cancer    Social History  Tobacco use: former smoker  Alcohol use: 5 drinks a week  Drug use: no  Marital Status:      Review of Systems   Constitutional: Positive for activity change.   Respiratory: Positive for cough and shortness of breath.    Musculoskeletal: Positive for myalgias.   Neurological: Positive  for weakness.   All other systems reviewed and are negative.    Physical Exam     Patient Vitals for the past 24 hrs:   BP Temp Temp src Pulse Resp SpO2   10/28/20 1700 112/59 -- -- 70 -- 99 %   10/28/20 1630 119/72 -- -- 70 -- 99 %   10/28/20 1600 121/66 -- -- 70 -- 100 %   10/28/20 1537 105/59 -- -- 71 -- 99 %   10/28/20 1454 102/58 98.1  F (36.7  C) Oral 84 17 97 %     Physical Exam    GENERAL: well developed, pleasant  HEAD: atraumatic  EYES: pupils reactive, extraocular muscles intact, conjunctivae normal  ENT:  mucus membranes moist  NECK:  trachea midline, normal range of motion  RESPIRATORY: no tachypnea, breath sounds clear to auscultation   CVS: normal S1/S2, no murmurs, intact distal pulses  ABDOMEN: soft, nontender, nondistention  MUSCULOSKELETAL: no deformities  SKIN: warm and dry, no acute rashes or ulceration  NEURO: GCS 15, cranial nerves intact, alert and oriented x3  PSYCH:  Mood/affect normal    Emergency Department Course   Imaging:  Radiology findings were communicated with the patient who voiced understanding of the findings.    XR Chest Portable 1 View:   Single portable AP view of the chest was obtained. Left   chest wall dual lead AICD leads are in stable position. Stable   cardiomediastinal silhouette. No suspicious focal pulmonary opacities.   No significant pleural effusion or pneumothorax.  as per radiology.     Laboratory:  Laboratory findings were communicated with the patient who voiced understanding of the findings.    CBC: WBC: 6.2, HGB: 14.0, PLT: 112 (L)  CMP: GFR estimate 60 (L), calcium 7.7 (L), bilirubin total 1.4, albumin 3.0 (L), protein total 6.4 (L) o/w WNL (Creatinine: 1.06)  1530 Troponin I: 0.029  1530 Lactic acid whole blood: 0.9  Blood culture (x2): pending     Symptomatic COVID-19: pending    UA with Microscopic: ketones 5 (H), protein albumin 10 (A), mucous present (A), o/w WNL    Interventions:  1538 NS 1L IV    Emergency Department Course:  Past medical records,  nursing notes, and vitals reviewed.    1525 I physically examined the patient as documented above.     IV was inserted and blood was drawn for laboratory testing, results above..     The patient was sent for radiographs while in the emergency department, results above.      Urine sample collected.      A Nasopharyngeal swab was obtained here in the ED.    1801 I consulted with Dr. Nogueira, on call hospitalist, regarding the patient's history and presentation here in the emergency department.     I rechecked the patient and discussed the findings of their workup thus far.     Findings and plan explained to the Patient who consents to admission. Discussed the patient with Dr. Nogueira, who will admit the patient to a [facility] bed for further monitoring, evaluation, and treatment.    I personally reviewed the laboratory and imaging results with the Patient and answered all related questions prior to admission.     Impression & Plan   Medical Decision Making:    Presents with fall and weakness with increased cough and congestion.  Certainly COVID-19 seems high in the differential other etiologies are considered as well.  Lactic acid is normal blood cultures are pending.  Chest x-ray and urine are unrevealing.  Patient does have a measurable troponin but stable EKG.  Likely stress response.  He has not had any head trauma to warrant imaging of his head although he is anticoagulated.  Patient is clear and lucid and denies head trauma.  Given decreased weakness all patient be admitted.  Patient is currently living at home may need placement.    Covid-19  This patient was evaluated during a global COVID-19 pandemic, which necessitated consideration that the patient might be at risk for infection with the SARS-CoV-2 virus that causes COVID-19.   Applicable protocols for evaluation were followed during the patient's care. COVID-19 was considered as part of the patient's evaluation. The plan for testing is: a test was obtained at a  previous visit and reviewed & considered today.    Diagnosis:    ICD-10-CM    1. Clinical diagnosis of COVID-19  U07.1 UA with Microscopic     Blood culture   2. Generalized muscle weakness  M62.81      Disposition:  Admitted to Dr. Nogueira.    Scribe Disclosure:  I, Nikita Lazaro, am serving as a scribe at 3:22 PM on 10/28/2020 to document services personally performed by Chauncey Ferguson MD based on my observations and the provider's statements to me.      Chauncey Ferguson MD  10/28/20 [id number]

## 2020-10-28 NOTE — ED NOTES
[facility]  ED Nurse Handoff Report    ED Chief complaint: Cough and Shortness of Breath      ED Diagnosis:   Final diagnoses:   Clinical diagnosis of COVID-19   Generalized muscle weakness       Code Status: DNR per polst .     Allergies: No Known Allergies    Patient Story: pt has new cough that started in the last 24 hours  Resident of [place] right now, found with sats in the 70's today, normally runs in the 80's.   Focused Assessment:  Pt cannot hear well at all - he is alert - requiring oxygen   Vitally stable otherwise.     Treatments and/or interventions provided: monitored  labed and lined   Patient's response to treatments and/or interventions: no changes noted     To be done/followed up on inpatient unit:  monitor     Does this patient have any cognitive concerns?: [place]     Activity level - Baseline/Home:  Unknown  Activity Level - Current:   Unknown    Patient's Preferred language: English   Needed?: No    Isolation: None and COVID r/o and special precautions  Infection: Not Applicable  COVID r/o and special precautions  Patient tested for COVID 19 prior to admission: YES  Bariatric?: No    Vital Signs:   Vitals:    10/28/20 1537 10/28/20 1600 10/28/20 1630 10/28/20 1700   BP: 105/59 121/66 119/72 112/59   Pulse: 71 70 70 70   Resp:       Temp:       TempSrc:       SpO2: 99% 100% 99% 99%       Cardiac Rhythm:     Was the PSS-3 completed:   Yes  What interventions are required if any?               Family Comments: son number is in the chart   OBS brochure/video discussed/provided to patient/family:na              Name of person given brochure if not patient: na              Relationship to patient: na    For the majority of the shift this patient's behavior was Green.   Behavioral interventions performed were na.    ED NURSE PHONE NUMBER: [phone]

## 2020-10-28 NOTE — ED NOTES
Bed: ED01  Expected date:   Expected time:   Means of arrival:   [facility] 89 [facility] cough low O2 sats positive covid eta 1443

## 2020-10-28 NOTE — ED TRIAGE NOTES
Patient comes in via EMS from home (lives with daughter) for increasing weakness.  Patient reports feeling generalized unwell feeling since Monday.  Does have a COVID test pending from Monday.  Today he is increasingly weak, worsening cough, SOB and body aches.  VSS.  Patient was 91% on RA for EMS upon arrival. Patient did fall twice today from feeling so weak.  Did not hit head.  Denies any injuries.

## 2020-10-28 NOTE — H&P
"[facility]    History and Physical  Hospitalist       Date of Admission:  10/28/2020  Date of Service (when I saw the patient): 10/28/20    Assessment & Plan   Balta RICK \"Ced\" Vidal is a pleasant 89 year-old gentleman with past Hx of chronic A-fib. (on Eliquis), HTN, dyslipidemia and depression.  He was brought to the ED today for evaluation of recent cough with SOB/MULLIGAN and falls at home.  Current problems include:    Cough with mild MULLIGAN/SOB and mild hypoxia; cause not clear.  Cannot rule out diastolic CHF w/ recent decompensation.  - check for Covid-19 (pending) and Influenza A/B/RSV (negative).  - add procalcitonin (not elevated)  - check d-dimer (if elevated, consider CT chest)  - and BNP --> if elevated, repeat ECHO in a.m.   - continue 02 to keep sats > 92%  - not coughing much at present, but can try PRN meds if cough becomes problematic here.  - hold on further Abx until Covid-19 result and other data known.    Abdominal pain; unclear cause. Lactate, WBC normal.  Previous Hx of diveriticulitis.  - consider abdom. XR and/or CT abd./pelvis if this persists.    HTN; pressures stable.  - resume PTA metoprolol; reassess in a.m    Chronic A-fib; rate controlled.  - resume PTA amiodarone and apixaban  - telemetry while here    Weakness with falls at home; no apparent injuries.  May have some degree of deconditioning.  - PT eval. Tomorrow    Decreased PO intake; unclear cause.  Ced says he has been dieting and lost about 25 # in the past several months.  - RD to eval.    Hx of depression; appears stable and euthymic.  - not on scheduled meds for this at the moment.  - observe                                                                   DVT Prophylaxis: Pneumatic Compression Devices, Ambulate every shift and is on Eliquis  Code Status: Full Code    Disposition: Expected discharge in 1-2 days.      KRISTIAN Nogueira MD, FACP     Internal Medicine Hospitalist    Primary Care Physician "   Edmundo Roblero    Chief Complaint   Cough, SOB x 2+ days.    History was obtained from the ED provider, the EHR and from patient    History of Present Illness   Per [facility] at [facility]:  Reason for call: Patient's daughter, Cassie, called to let Dr. Roblero know that patient will be going to [facility]. Patient fell this morning (10/28/2020), is experiencing weakness in arms and legs, and is also coughing. Patient did have a Covid-19 test completed on 1026/2020 but results are still pending. Please advise.    Per ED provider today:  Balta Drake is a 89 year old male with a history of anticoagulation on Eliquis, atrial fibrillation, aortic root dilation, hypertension, MI, and hyperlipidemia, among others, who lives with his daughter and presents via EMS for evaluation of cough and shortness of breath. The patient reports that he has been feeling generally unwell for the past two days. These symptoms include a cough, increasingly worsening shortness of breath, weakness, myalgias, and a decreased appetite. With the ongoing COVID-19 pandemic, the patient decided to get a COVID test that day and the results are still pending. Today, the patient continued to feel worse. As a result of his weakness, the patient fell twice; however, he did not hit his head. EMS was called as a result of this and he was brought to the ED for evaluation. Upon arrival, the patient O2 sats were 91% on room air. He denies any injuries and is only complaining of his COVID like symptoms mentioned above.    Past Medical History    I have reviewed this patient's medical history and updated it with pertinent information if needed.   Past Medical History:   Diagnosis Date     A-fib (H) 2003 and 2005    cardioverted 2005     Anal fissure      Aortic root dilatation (H)      ASCVD (arteriosclerotic cardiovascular disease) 2003    ptca, angio 6/13/14-medical mgmt     Cardiomyopathy (H)      Colonic polyp     fu colonoscopy 2009 one polyp      Diverticulitis 2005     Hyperlipidaemia      Hypertension      Myocardial infarction (H)     S/P Unspecified     Noninfectious ileitis     diverticulitis     NSVT (nonsustained ventricular tachycardia) (H)      PMR (polymyalgia rheumatica) (H) 2001     Sick sinus syndrome (H) 2003    dual chamber pacemaker implanted 2003, generator chagne 2009     Spinal stenosis      Unspecified essential hypertension        Past Surgical History   I have reviewed this patient's surgical history and updated it with pertinent information if needed.  Past Surgical History:   Procedure Laterality Date     APPENDECTOMY       APPENDECTOMY       ARTHROPLASTY HIP Right 7/7/2015    Procedure: ARTHROPLASTY HIP;  Surgeon: Marah Durham MD;  Location:  OR     ARTHROSCOPY KNEE  2003     C TOTAL HIP ARTHROPLASTY  2007    left     COLONOSCOPY      polyp     ENT SURGERY      tonsillectomy     EP PERM PACER DBLE LEAD N/A 8/13/2019    Procedure: EP Perm Pacer Double Lead;  Surgeon: Ni Thornton MD;  Location:  HEART CARDIAC CATH LAB     GRAFT BONE FROM ILIAC CREST  7/9/2014    Procedure: GRAFT BONE FROM ILIAC CREST;  Surgeon: Jose Francisco Anderson MD;  Location:  OR     [facility] stent       HEART CATH CORONARY ANGIOGRAM W/LV GRAM  6/2014    chronic occlusion ramus, significant narrowing at origin of 2nd marginal branch     HERNIA REPAIR       HERNIA REPAIR       IMPLANT PACEMAKER  3/03    6/09 generator replacement     LAMINECTOMY, FUSION LUMBAR ONE LEVEL, COMBINED  7/9/2014    Procedure: COMBINED LAMINECTOMY, FUSION LUMBAR ONE LEVEL;  Surgeon: Jose Francisco Anderson MD;  Location:  OR     lumbar disc surgery  2006     lumbar disc surgery         Prior to Admission Medications   Prior to Admission Medications   Prescriptions Last Dose Informant Patient Reported? Taking?   Acetaminophen (TYLENOL) 325 MG CAPS Past Week at Unknown time Daughter Yes Yes   Sig: Take 325-650 mg by mouth every 4 hours as needed    amiodarone (PACERONE) 100 MG TABS tablet  10/27/2020 at Unknown time Daughter No Yes   Sig: Take 1 tablet (100 mg) by mouth daily   apixaban ANTICOAGULANT (ELIQUIS) 5 MG tablet 10/27/2020 at Unknown time Daughter No Yes   Sig: Take 1 tablet (5 mg) by mouth 2 times daily   celecoxib (CELEBREX) 200 MG capsule 10/27/2020 at Unknown time Daughter No Yes   Sig: Take 1 capsule by mouth daily.   ezetimibe (ZETIA) 10 MG tablet 10/27/2020 at Unknown time Daughter No Yes   Sig: Take 1 tablet (10 mg) by mouth daily   fluticasone (FLONASE) 50 MCG/ACT nasal spray 10/27/2020 at Unknown time Daughter Yes Yes   Sig: Spray 1 spray into both nostrils daily   metoprolol succinate ER (TOPROL-XL) 50 MG 24 hr tablet 10/27/2020 at Unknown time Daughter No Yes   Sig: Take 1 tablet (50 mg) by mouth daily   rosuvastatin (CRESTOR) 20 MG tablet 10/27/2020 at Unknown time Daughter No Yes   Sig: Take 1 tablet (20 mg) by mouth daily   tamsulosin (FLOMAX) 0.4 MG 24 hr capsule 10/27/2020 at Unknown time Daughter Yes Yes   Sig: Take 0.4 mg by mouth daily      Facility-Administered Medications: None     Allergies   No Known Allergies    Social History   I have reviewed this patient's social history and updated it with pertinent information if needed. Balta Drake  reports that he quit smoking about 39 years ago. His smoking use included cigarettes. He started smoking about 60 years ago. He quit after 20.00 years of use. He has never used smokeless tobacco. He reports current alcohol use. He reports that he does not use drugs.  Update: is ; his wife  5 years ago.  Has been living with his daughter.  Retired; former .  Occasional alcohol use.  No tobacco.    Family History   I have reviewed this patient's family history and updated it with pertinent information if needed.   Family History   Problem Relation Age of Onset     Cardiovascular Father      Chronic Obstructive Pulmonary Disease Father      Heart Disease Mother      Breast Cancer Mother      Lung Cancer Brother       Other - See Comments Brother         something heart related       Review of Systems   The 10 point Review of Systems is negative other than noted in the HPI or here.  No recent fever/chills; intermittent cough and dyspnea when walking.  Occ. Brings up whitish phlegm.  Has had one or more falls in recent weeks; fell today at home.  No apparent injuries earlier.   Reports mid-lower abdominal pain, on/off recently; no nausea and no change in appetite.  Had a large BM yesterday; no blood.  No leg swelling.  No change in sleep.    Physical Exam   Temp: 98.1  F (36.7  C) Temp src: Oral BP: 112/59 Pulse: 70   Resp: 17 SpO2: 99 % O2 Device: None (Room air)    Vital Signs with Ranges  Temp:  [98.1  F (36.7  C)] 98.1  F (36.7  C)  Pulse:  [70-84] 70  Resp:  [17] 17  BP: (102-121)/(58-72) 112/59  SpO2:  [97 %-100 %] 99 %  0 lbs 0 oz    Constitutional: awake, lying in bed; in no apparent distress  Eyes: sclerae clear; PERRLA/EOMI  HEENT: quite hard of hearing; AT/NC; moist mucous membranes, normal dentition.  Neck: supple, good ROM; no LA, no bruits, no JVD, no thyromegaly    Respiratory: good air entry bilaterally; inspiratory rhonchi bilateral bases; no wheezing  Back: no focal tenderness or other abnormalities  Cardiovascular: Regular rate and rhythm; S1, S2 noted; no murmur, rub or gallop  GI: abdomen flat, + bowel sounds, soft, no focal tenderness, non-distended; no masses or organomegaly  Skin: no rash, no cyanosis  Musculoskeletal: no edema; no obvious joint abnormalities  Neurologic: alert; oriented to person, and place (date: knows it is October and 2020, but cannot recall the day or correct date); follows directions well; no focal motor or sensory deficits  Psychiatric: no obvious signs of anxiety or depression.    Data   Data reviewed today:  I personally reviewed all recent labs/imaging results.    Recent Labs   Lab 10/28/20  1530   WBC 6.2   HGB 14.0   *   *      POTASSIUM 4.1   CHLORIDE  102   CO2 24   BUN 23   CR 1.06   ANIONGAP 8   MARAH 7.7*   GLC 93   ALBUMIN 3.0*   PROTTOTAL 6.4*   BILITOTAL 1.4*   ALKPHOS 79   ALT 28   AST 34   TROPI 0.029       Recent Results (from the past 24 hour(s))   XR Chest Port 1 View    Narrative    CHEST PORTABLE ONE VIEW   10/28/2020 4:18 PM     HISTORY: Cough, fever.    COMPARISON: Chest x-ray on 2017      Impression    IMPRESSION: Single portable AP view of the chest was obtained. Left  chest wall dual lead AICD leads are in stable position. Stable  cardiomediastinal silhouette. No suspicious focal pulmonary opacities.  No significant pleural effusion or pneumothorax.    RENATO CARY MD     Echocardiogram Complete  Order: [id number]  Status:  Edited Result - FINAL   Visible to patient:  Yes (MyChart) Next appt:  2020 at 09:20 AM in Lab ([facility]) Dx:  Cardiac pacemaker in situ; Pacemaker ...  Details    Reading Physician Reading Date Result Priority   Aramis Sheets MD  [phone] 2019       Narrative & Impression     [id number]  [id number]  [id number]  [id number]^FEDE^NEFTALI^BENJAMIN        [facility]  [facility]  Echocardiography Laboratory  [address]  [place] [address]  Phone [phone]  Fax [phone]     Name: VOLODYMYR GONZALES  MRN: [id number]  : 10/23/1927  Study Date: 2019 12:48 PM  Age: 89 yrs  Gender: Male  Patient [facility]  Reason For Study: Cardiac pacemaker in situ, Pacemaker battery depletion,  Paroxysm  Ordering Physician: NEFTALI MISTRY  Referring Physician: Gustavo Roblero MD  Performed By: Brooke Payne RDCS     BSA: 1.9 m2  Height: 66 in  Weight: 167 lb  HR: 70  BP: 110/66 mmHg  _____________________________________________________________________________  Procedure  Complete Echo Adult.     Interpretation Summary     Left ventricular systolic function is low normal.  The visual ejection fraction is estimated at  50-55%.  Compared to 5/18, EF appears higher now. The study was technically difficult.

## 2020-10-28 NOTE — PHARMACY-ADMISSION MEDICATION HISTORY
Pharmacy Medication History  Admission medication history interview status for the 10/28/2020  admission is complete. See EPIC admission navigator for prior to admission medications       Medication history sources: Patient's family/friend (eliu Matthews [phone] )  Location of interview:   Medication history source reliability: Good  Adherence assessment: Good    Significant changes made to the medication list:  Discontinued:  New: flonase   Changed:           Additional medication history information:       Medication reconciliation completed by provider prior to medication history? No    Time spent in this activity: 25min       Prior to Admission medications    Medication Sig Last Dose Taking? Auth Provider   Acetaminophen (TYLENOL) 325 MG CAPS Take 325-650 mg by mouth every 4 hours as needed  Past Week at Unknown time Yes Reported, Patient   amiodarone (PACERONE) 100 MG TABS tablet Take 1 tablet (100 mg) by mouth daily 10/27/2020 at Unknown time Yes Waldo Lyon MD   apixaban ANTICOAGULANT (ELIQUIS) 5 MG tablet Take 1 tablet (5 mg) by mouth 2 times daily 10/27/2020 at Unknown time Yes Waldo Lyon MD   celecoxib (CELEBREX) 200 MG capsule Take 1 capsule by mouth daily. 10/27/2020 at Unknown time Yes Edmundo Roblero MD   ezetimibe (ZETIA) 10 MG tablet Take 1 tablet (10 mg) by mouth daily 10/27/2020 at Unknown time Yes Waldo Lyon MD   fluticasone (FLONASE) 50 MCG/ACT nasal spray Spray 1 spray into both nostrils daily 10/27/2020 at Unknown time Yes Unknown, Entered By History   metoprolol succinate ER (TOPROL-XL) 50 MG 24 hr tablet Take 1 tablet (50 mg) by mouth daily 10/27/2020 at Unknown time Yes Waldo Lyon MD   rosuvastatin (CRESTOR) 20 MG tablet Take 1 tablet (20 mg) by mouth daily 10/27/2020 at Unknown time Yes Waldo Lyon MD   tamsulosin (FLOMAX) 0.4 MG 24 hr capsule Take 0.4 mg by mouth daily 10/27/2020 at Unknown time Yes  Reported, Patient     Georgiana MoctezumaD

## 2020-10-28 NOTE — ED NOTES
[facility]  ED Nurse Handoff Report    ED Chief complaint: Cough and Shortness of Breath      ED Diagnosis:   Final diagnoses:   Clinical diagnosis of COVID-19   Generalized muscle weakness       Code Status: to be discussed with hospitalist     Allergies: No Known Allergies    Patient Story: pt increasingly weak and sob .   Focused Assessment:  Alert and calm  Vitally stable     Treatments and/or interventions provided: monitor , labs , chest xray , admission , fluids   Patient's response to treatments and/or interventions: no change     To be done/followed up on inpatient unit:  monitor     Does this patient have any cognitive concerns?: none     Activity level - Baseline/Home:  Independent  Activity Level - Current:   Unknown    Patient's Preferred language: English   Needed?: No    Isolation: None and COVID r/o and special precautions  Infection: Not Applicable  COVID r/o and special precautions  Patient tested for COVID 19 prior to admission: YES  Bariatric?: No    Vital Signs:   Vitals:    10/28/20 1537 10/28/20 1600 10/28/20 1630 10/28/20 1700   BP: 105/59 121/66 119/72 112/59   Pulse: 71 70 70 70   Resp:       Temp:       TempSrc:       SpO2: 99% 100% 99% 99%       Cardiac Rhythm:     Was the PSS-3 completed:   Yes  What interventions are required if any?               Family Comments: na  OBS brochure/video discussed/provided to patient/family: N/A              Name of person given brochure if not patient: na              Relationship to patient: na    For the majority of the shift this patient's behavior was Green.   Behavioral interventions performed were liliana.    ED NURSE PHONE NUMBER: [phone]

## 2020-10-29 NOTE — PLAN OF CARE
DATE & TIME: 10/28/2020 2065-8207  Cognitive Concerns/ Orientation : A&O x4. Forgetful. [place], hearing aids at bedside (please watch for these as pt loses them in his bedding at times)  BEHAVIOR & AGGRESSION TOOL COLOR: Green  CIWA SCORE: N/A  ABNL VS/O2: VSS on 2L NC. MULLIGAN   MOBILITY: A x1 GB/W   PAIN MANAGMENT: Denies pain  DIET: Low fat, low Na, no caffeine   BOWEL/BLADDER: Continent, urinal at bedside   ABNL LAB/BG: [facility] [address]  DRAIN/DEVICES: R hand PIV SL  TELEMETRY RHYTHM: 100% A-paced  SKIN: Scattered bruising, scabbed   TESTS/PROCEDURES: None scheduled   D/C DAY/GOALS/PLACE: Disharge pending improvement   OTHER IMPORTANT INFO: PT, Nutrition consulted. Special COVID precautions maintained. LS fine crackles in bilateral bases. Infrequent cough with white mucous per pt.

## 2020-10-29 NOTE — PLAN OF CARE
DATE & TIME: 10/28/2020 4529-1131  Cognitive Concerns/ Orientation : A&Ox4  BEHAVIOR & AGGRESSION TOOL COLOR: Green  CIWA SCORE: N/A  ABNL VS/O2: VSS, on 3L/NC  MOBILITY: Asst 1 walker and belt  PAIN MANAGMENT: Denies pain  DIET: Low fat, low Na, no caffeine   BOWEL/BLADDER: Continent  ABNL LAB/BG: [facility] [address]  DRAIN/DEVICES: PIV, S.L.  TELEMETRY RHYTHM: 100% A-paced  SKIN: Bruises, scabs  TESTS/PROCEDURES:   D/C DAY/GOALS/PLACE: Disharge pending progress.  OTHER IMPORTANT INFO: PT, Nutrition consulted.  Influenza and Covid swabs pending.

## 2020-10-29 NOTE — PROVIDER NOTIFICATION
MD Notification    Notified Person: MD    Notified Person Name: Jose Antonio Cao    Notification Date/Time: 10/29/2020 7450    Notification Interaction: paged    Purpose of Notification: positive covid results     Orders Received:pending    Comments:

## 2020-10-29 NOTE — CONSULTS
"CLINICAL NUTRITION SERVICES  -  ASSESSMENT NOTE    Recommendations Ordered by Registered Dietitian (RD):   - Measure current weight  - Send protein supplements w/ meals  Boost Plus TID w/ meals   - Thera vit M daily     Consider liberalized diet order if patient has a poor appetite    Malnutrition:   % Weight Loss:  No current weight on file  % Intake: Unable to assess  Subcutaneous Fat Loss: Did not perform Physical Exam - COVID+  Muscle Loss:  Did not perform Physical Exam - COVID+  Fluid Retention:  None noted    Malnutrition Diagnosis: Unable to determine due to lack of information. No current anthropometrics, and unable to speak with patient to obtain nutrition history.      REASON FOR ASSESSMENT  Balta Drake is a 89 year old male seen by Registered Dietitian for Admission Nutrition Risk Screen for reduced oral intake over the last month and Provider Order - \"Please eval. re. poor PO intake/low alb., nutritional risk.  Thanks\"    NUTRITION HISTORY  - Information obtained from chart review. Unfortunately pt has been confirmed COVID+, Attempted to call patient in his room, he was unable to be reached. Chart review indicates that he is [place] and uses hearing aids.     - H/o anticoagulation, afib, aortic root dilation, hypertension, MI, hyperlipidemia.   - Lives w/ his daughter and presented to ED for eval of cough, shortness of breath. Also endorsed weakness, myalgias, decreased appetite (?constipation).   - Feeling unwell for 2 days PTA.     - Reportedly had been \"dieting\", lost 25# in several months (per MD documentation).     CURRENT NUTRITION ORDERS  Diet Order:     Low Saturated Fat/2400 mg Sodium/No Caffeine     Current Intake/Tolerance:  Today denies abdominal pain, did note some mild nausea and bloating.   Ordered Armenian toast, eggs, fruit, and juice for breakfast. No intake recorded.     NUTRITION FOCUSED PHYSICAL ASSESSMENT FOR DIAGNOSING MALNUTRITION)  No:  COVID+           Obtained from " "Chart/Interdisciplinary Team:  \"well nourished elderly male\"   Last BM 10/28    ANTHROPOMETRICS  Height: 5' 6\"  Weight: No recent weight on file   There is no height or weight on file to calculate BMI.  IBW: 64.5 kg   Weight History: No current weight on file. Appears to have lost weight from 5/2020 --> 9/2020 (6% in 4 months)     09/14/20 76.7 kg (169 lb)  Wt Readings from Last 2 Encounters:   05/21/20 81.6 kg (180 lb)   08/13/19 75.9 kg (167 lb 6.4 oz)       LABS  Labs reviewed    MEDICATIONS  Medications reviewed    ASSESSED NUTRITION NEEDS PER APPROVED PRACTICE GUIDELINES:  Dosing Weight No current weight on file   Estimated Energy Needs: 25-30 Kcal/Kg  Justification: maintenance  Estimated Protein Needs: 1.2-1.5 g pro/Kg  Justification: maintenance  Estimated Fluid Needs: 1 mL/kcal  Justification: maintenance    MALNUTRITION:  % Weight Loss:  No current weight on file  % Intake: Unable to assess  Subcutaneous Fat Loss: Did not perform Physical Exam - COVID+  Muscle Loss:  Did not perform Physical Exam - COVID+  Fluid Retention:  None noted    Malnutrition Diagnosis: Unable to determine due to lack of information. No current anthropometrics, and unable to speak with patient to obtain nutrition history.     NUTRITION DIAGNOSIS:  Predicted inadequate nutrient intake (energy/protein) related to COVID+ Status, likely to have decreased intakes as a result      NUTRITION INTERVENTIONS  Recommendations / Nutrition Prescription  Diet as ordered - Consider liberalize to regular if appetite is poor     Boost Plus TID w/ meals     Thera vit M daily       Implementation  Nutrition education: Per Provider order if indicated   Medical Food Supplement and Multivitamin/Mineral: as above      Nutrition Goals  Intake of >/=75% meals BID-TID.       MONITORING AND EVALUATION:  Progress towards goals will be monitored and evaluated per protocol and Practice Guidelines    Vianca Houser RD, LD  [facility] [address], [place]   Pager: " [phone]  Weekend Pager: [phone]

## 2020-10-29 NOTE — PLAN OF CARE
After thorough chart review and discussion with care team, decision made to hold (PT/OT/SLP) eval while test/re-test pending for COVID-19. Will re-assess status daily.

## 2020-10-29 NOTE — UTILIZATION REVIEW
"  Admission Status; Secondary Review Determination         Under the authority of the Utilization Management Committee, the utilization review process indicated a secondary review on the above patient.  The review outcome is based on review of the medical records, discussions with staff, and applying clinical experience noted on the date of the review.        (X)      Inpatient Status Appropriate - This patient's medical care is consistent with medical management for inpatient care and reasonable inpatient medical practice.      () Observation Status Appropriate - This patient does not meet hospital inpatient criteria and is placed in observation status. If this patient's primary payer is Medicare and was admitted as an inpatient, Condition Code 44 should be used and patient status changed to \"observation\".   () Admission Status NOT Appropriate - This patient's medical care is not consistent with medical management for Inpatient or Observation Status.          RATIONALE FOR DETERMINATION     89 year-old male with a past Hx of chronic A-fib. (on Eliquis), HTN, dyslipidemia and depression.  He presented for evaluation of recent cough with SOB/MULLIGAN and falls at home.  He has had decrease in his appetite and diminished intake. O2 saturations were 91%.  He was found to be Covid positive and will continue to require hospitalization.  He is appropriate for Inpatient status..      The severity of illness, intensity of service provided, expected LOS and risk for adverse outcome make the care complex, high risk and appropriate for hospital admission.        The information on this document is developed by the utilization review team in order for the business office to ensure compliance.  This only denotes the appropriateness of proper admission status and does not reflect the quality of care rendered.         The definitions of Inpatient Status and Observation Status used in making the determination above are those provided in " the CMS Coverage Manual, Chapter 1 and Chapter 6, section 70.4.      Sincerely,     Jose Francisco Buck MD  Physician Advisor  Utilization Review/ Case Management  [facility].

## 2020-10-29 NOTE — PROGRESS NOTES
RECEIVING UNIT ED HANDOFF REVIEW    ED Nurse Handoff Report was reviewed by: Jojo Herrera RN on October 28, 2020 at 7:08 PM

## 2020-10-29 NOTE — PROGRESS NOTES
[facility]    Medicine Progress Note - Hospitalist Service       Date of Admission:  10/28/2020  Assessment & Plan       Balta Drake is a 89 year old male admitted on 10/28/2020.  Past Hx of chronic A-fib. (on Eliquis), HTN, dyslipidemia and depression.  He was brought to the ED for evaluation of recent cough with SOB/MULLIGAN and falls at home.         COVID19 infection  Presents with cough with mild MULLIGAN/SOB and mild hypoxia.  Unlikely diastolic CHF w/ Pro-BNP wnl and trop x2 negative.  Influenza/RSV PCR negative.  Procal low and CXR negative for infiltrate.  D-dimer minimally elevated at 0.6 but low suspicion for PE as chronically on Eliquis.   - Covid-19 pending this admission, but per discussion with daughter 10/29 - test from [facility] 2 days ago returned positive - will attempt to obtain records  - add on routine COVID labs  - currently stable on room air with minimal cough    Thrombocytopenia   Admission plt 112, no history of low platelets.  Note MCV of 102.  - B12 and folate pending  - repeat CBC pending     Abdominal pain  Suspect constipation.  Lactate, WBC normal.  Previous Hx of diveriticulitis.  - reports some nausea and mild bloating this AM, reports last stool was 2 days and was constipated at that time; will give miralax     HTN   Chronic A-fib  Rate controlled.  - continue PTA amiodarone and apixaban, metoprolol  - telemetry while here     Weakness with falls at home  No apparent injuries.  May have some degree of deconditioning.  - PT eval pending     Decreased PO intake  Unclear cause.  Ced says he has been dieting and lost about 25 # in the past several months.  - RD to eval.     Hx of depression; appears stable and euthymic.  - not on scheduled meds for this at the moment.         Diet: Combination Diet Low Saturated Fat Na <2400mg Diet, No Caffeine Diet    DVT Prophylaxis: Pneumatic Compression Devices  Meredith Catheter: not present  Code Status: Full Code          Disposition Plan   Expected discharge: Tomorrow, recommended to prior living arrangement once work-up complete, assessed by PT.  Entered: Jose Antonio Cao MD 10/29/2020, 9:35 AM       The patient's care was discussed with the Bedside Nurse, Patient and Patient's Family.    Jose Antonio Cao MD  Hospitalist Service  [facility]  Contact information available via [facility] Paging/Directory    ______________________________________________________________________    Interval History   Reports an intermittent cough which is unchanged.  Reports feeling weak and fatigued but denies any dyspnea.  No chest pain/pressure.  No abdominal pain.  Has mild nausea and mild bloating, feels constipated.  No fever/chills, myalgias, arthralgias, cough, headache, sore throat, changes in smell or taste.     Data reviewed today: I reviewed all medications, new labs and imaging results over the last 24 hours. I personally reviewed no images or EKG's today.    Physical Exam   Vital Signs: Temp: 97.9  F (36.6  C) Temp src: Oral BP: 119/61 Pulse: 73   Resp: 16 SpO2: 99 % O2 Device: Nasal cannula Oxygen Delivery: 1 LPM  Weight: 0 lbs 0 oz  General Appearance: Well nourished elderly male in NAD  Respiratory: few bibasilar crackles, no wheezing or tachypnea  Cardiovascular: RRR, normal s1/s2 without murmurm, no peripheral edema  GI: abdomen soft, normal bowel sounds, nontender, nondistended  Other: Alert and appropriate, cranial nerves grossly intact     Data   Recent Labs   Lab 10/29/20  0630 10/29/20  0038 10/28/20  1530   WBC  --   --  6.2   HGB  --   --  14.0   MCV  --   --  102*   PLT  --   --  112*   NA  --   --  134   POTASSIUM  --   --  4.1   CHLORIDE  --   --  102   CO2  --   --  24   BUN  --   --  23   CR  --   --  1.06   ANIONGAP  --   --  8   MARAH  --   --  7.7*   GLC  --   --  93   ALBUMIN  --   --  3.0*   PROTTOTAL  --   --  6.4*   BILITOTAL  --   --  1.4*   ALKPHOS  --   --  79   ALT  --   --  28   AST  --   --   34   TROPI <0.015 0.021 0.029

## 2020-10-29 NOTE — ED NOTES
Pt was given a meal tray . Hearing aides are in his belongings bag  Urine cup with a label on the cup.

## 2020-10-30 NOTE — PLAN OF CARE
DATE & TIME: 10/30/20 7-3 pm    Cognitive Concerns/ Orientation : A & O x2, disorinetated to time and situation. Significantly [place]. Forgetful. Not restless this shift.   BEHAVIOR & AGGRESSION TOOL COLOR: Green   CIWA SCORE: NA   ABNL VS/O2: VSS on 2L nasal cannula. Oxygen saturation dropped w/ activity.   MOBILITY: A1 w/ walker. Slow moving, utilizing BSC.   PAIN MANAGMENT: Denies  DIET: Combination Diet Low Saturated Fat Na <2400mg Diet, No Caffeine Diet.   BOWEL/BLADDER: Continent/Incontinent.  ABNL LAB/BG: Sodium 132, CRP 40.9, Lactate dehydrogenase 366.   DRAIN/DEVICES: PIV SL  TELEMETRY RHYTHM: 100% paced. Discontinued this shift.   SKIN: Intact.   TESTS/PROCEDURES: None scheduled.   D/C DAY/GOALS/PLACE: Pending, TCU when ready.   OTHER IMPORTANT INFO: COVID positive. C/O of feeling SOB. Infrequent cough present. Completed Dexamethasone and first dose of remdesivir.

## 2020-10-30 NOTE — PLAN OF CARE
DATE & TIME: 10.29.20 8671-7318    Cognitive Concerns/ Orientation : AOx4   BEHAVIOR & AGGRESSION TOOL COLOR: Yellow  CIWA SCORE: NA   ABNL VS/O2: VSS on RA  MOBILITY: A1 GBW  PAIN MANAGMENT: Denies  DIET: Cardiac no Caffine  BOWEL/BLADDER: Incontinent.  ABNL LAB/BG: Elevated CPR inflammation, Ferritin. Low platelet count  DRAIN/DEVICES: PIV SL  TELEMETRY RHYTHM: 100% A paced w. PVCs  SKIN: bruised.   TESTS/PROCEDURES: NA  D/C DAY/GOALS/PLACE: Pending  OTHER IMPORTANT INFO: Covid +. Multiple loose stool before bedtime. Keeps pulling out tele stickers.

## 2020-10-30 NOTE — PROGRESS NOTES
Care Management Follow Up Note    Length of Stay (days) 2    Patient plan of care discussed at Interdisciplinary Rounds: yes  Expected Discharge Date: (TBD)  Concerns to be Addressed:     discharge planning    Anticipated Discharge Disposition:  TBD  Anticipated Discharge Services:  TBD  Anticipated Discharge DME:  TBD    Plan:  SW received a phone call from pt's son, Gadiel, on 10/29. He wanted it known that pt has an assisted living apartment at [facility] but has chosen not to live there for the about the past year. Instead, pt has chosen to live at his daughter, Cassie's, home with her family. Gadiel states there are six children and not all agree that living with Cassie is the best environment. He did not give further details. Let Gadiel know that DANAY would be following for discharge planning. Formal consult to follow. Noted PT is consulted.     CODI Green, [facility]  [phone]  [facility]

## 2020-10-30 NOTE — PROGRESS NOTES
"   10/30/20 0824   Quick Adds   Type of Visit Initial PT Evaluation   Living Environment   People in home child(goldie), adult  (daughter)   Current Living Arrangements house   Home Accessibility stairs to enter home;stairs within home   Number of Stairs, Main Entrance 1  (platform step per pt)   Number of Stairs, Within Home, Primary other (see comments)  (flight to upper level bedroom ~12 steps)   Stair Railings, Within Home, Primary railing on left side (ascending)   Living Environment Comments Bathroom includes walk in shower with shower chair. Pt reports daughter works out o her house & comes and goes during the day, is often gone for a few hours at a time \"but I don't have any problem with that\"   Self-Care   Activity/Exercise/Self-Care Comment Pt typically uses cane outside the house, sometimes uses cane or no AD inside the house. Pt has a 4WW but does not use it.   Disability/Function   Hearing Difficulty or Deaf yes   Patient's preferred means of communication verbal   Walking or Climbing Stairs Difficulty yes   Walking or Climbing Stairs ambulation difficulty, requires equipment;stair climbing difficulty, requires equipment  (uses cane for community ambu, sometimes in home)   Fall history within last six months yes   Number of times patient has fallen within last six months   (2 day of admit, \"I'm sure it's 6 anyway\" past 6 mo)   Change in Functional Status Since Onset of Current Illness/Injury yes   General Information   Onset of Illness/Injury or Date of Surgery 09/28/20   Referring Physician Jose Francisco Nogueira MD   Patient/Family Therapy Goals Statement (PT) to return home to daughter's house   Pertinent History of Current Problem (include personal factors and/or comorbidities that impact the POC) Pt admitted 10/28, presented with shortness of breath/ dyspnea on exertion and weakness with 2 falls at home day of admission per chart. Pt found to be Covid  positive. PMH significant for chronic A-fib. (on " Eliquis), HTN, dyslipidemia and depression.   Existing Precautions/Restrictions fall;oxygen therapy device and L/min  (2L O2 via NC currently)   Cognition   Orientation Status (Cognition) oriented to;person;place;time   Pain Assessment   Patient Currently in Pain No  (denies pain)   Integumentary/Edema   Integumentary/Edema Comments small abrasion/scab noted L elbow   Posture    Posture Forward head position;Protracted shoulders   Range of Motion (ROM)   ROM Comment ROM functional for bed mobility/ transfers but pt requests assist to don socks seated at EOB, states he has difficulty with this at home and has to be in certain position   Strength   Strength Comments B hip flexion 4+/5, knee extension 5/5, ankle DF 5/5, hip ABD/ADD strong & symmetrical; pt does appear to have some trunk/UE weakness with bed mobility   Bed Mobility   Comment (Bed Mobility) Pt initially requesting PT's assist for supine>sit from flat bed; able to complete with cues and SBA   Transfers   Transfer Safety Comments Sit>stand from EOB with FWW and SBA, pt reaching to pull up on walker.   Gait/Stairs (Locomotion)   Comment (Gait/Stairs) Able to step forward/back at bedside & march in place with FWW and CGA, fatigues quickly & needs O2 increased to 3L   Sensory Examination   Sensory Perception Comments denies numbness/tingling   Clinical Impression   Criteria for Skilled Therapeutic Intervention yes, treatment indicated   PT Diagnosis (PT) difficulty ambulating   Influenced by the following impairments decreased activity tolerance, decreased endurance, respiratory status/new O2 needs, decreased balance, decreased strength   Functional limitations due to impairments decreased independence with bed mobility and transfers, difficulty ambulating household & community distances, difficulty with self cares   Clinical Presentation Evolving/Changing  (O2 needs monitoring throughout)   Clinical Presentation Rationale clinical judgement, vitals  "assessment   Clinical Decision Making (Complexity) low complexity   Therapy Frequency (PT) Daily   Predicted Duration of Therapy Intervention (days/wks) 4 days   Planned Therapy Interventions (PT) balance training;bed mobility training;gait training;patient/family education;stair training;strengthening;transfer training;home exercise program   Risk & Benefits of therapy have been explained evaluation/treatment results reviewed;care plan/treatment goals reviewed;current/potential barriers reviewed;participants included;patient;risks/benefits reviewed   Clinical Impression Comments Patient is below baseline, fatiguing quickly, needing increased O2 for mobility. Pt will benefit from continued PT in hospital and after discharge to progress activity tolerance, endurance, safety & independence with mobility   PT Discharge Planning    PT Discharge Recommendation (DC Rec) Transitional Care Facility   PT Rationale for DC Rec Pt fatigues quickly & has increased O2 needs currently & desaturates with standing/OOB activity. Recommend continued therapies in TCU to progress activity tolerance, endurance, safety & indepedence with mobility. Pending progress, if pt able to progress to ambulate houshold distances safely & can demonstrate endurance/strength for flight of stairs with assist, may be candidate to return home with Home PT   PT Brief overview of current status  Needed increase to 3L O2 for activity, desaturated to mid 80s with standing marching ~35\" x2, recovered with seated rest. Returned to 2L O2 at end of session while supine in bed.    [facility] AM-PAC TM \"6 Clicks\"   2016, Trustees of [facility], under license to [employer].  All rights reserved.   6 Clicks Short Forms Basic Mobility Inpatient Short Form   Total Evaluation Time   Total Evaluation Time (Minutes) 12     "

## 2020-10-30 NOTE — PROGRESS NOTES
[facility]    Medicine Progress Note - Hospitalist Service       Date of Admission:  10/28/2020  Assessment & Plan       Balta Drake is a 89 year old male admitted on 10/28/2020.  Past Hx of chronic A-fib. (on Eliquis), HTN, dyslipidemia and depression.  He was brought to the ED for evaluation of recent cough with SOB/MULLIGAN and falls at home.         COVID19 infection  Presents with cough with mild MULLIGAN/SOB and mild hypoxia.  COVID19 positive.  Unlikely diastolic CHF w/ Pro-BNP wnl and trop x2 negative.  Influenza/RSV PCR negative.  Procal low and CXR negative for infiltrate.  D-dimer minimally elevated at 0.6 but low suspicion for PE as chronically on Eliquis.   - hypoxic with activity today and saturation low 90's on room air today; will start remdesivir and dexamethasone  - inflammatory markers stable to slightly improved  - wean oxygen as able    Thrombocytopenia   Admission plt 112, no history of low platelets.  Note MCV of 102.  B12 wnl.  - folate pending  - platelets remain stable 10/30     Abdominal pain  Suspect constipation.  Lactate, WBC normal.  Previous Hx of diveriticulitis.  - no complaints of abdominal pain today     HTN   Chronic A-fib  Rate controlled.  - continue PTA amiodarone and apixaban, metoprolol     Weakness with falls at home  No apparent injuries.  May have some degree of deconditioning.  - PT recommends TCU     Decreased PO intake  Ced says he has been dieting and lost about 25 # in the past several months.  Current lack of appetite likely due to COVID.   - nutrition following     Hx of depression  Appears stable and euthymic.  - not on scheduled meds for this at the moment.         Diet: Combination Diet Low Saturated Fat Na <2400mg Diet, No Caffeine Diet  Snacks/Supplements Adult: Boost Plus; With Meals    DVT Prophylaxis: apixaban  Meredith Catheter: not present  Code Status: Full Code         Disposition Plan   Expected discharge: 2 - 3 days, recommended to  transitional care unit once clinically stable, safe dispo identified.  Entered: Jose Antonio Cao MD 10/30/2020, 10:41 AM       The patient's care was discussed with the Bedside Nurse and Patient.    Jose Antonio Cao MD  Hospitalist Service  [facility]  Contact information available via [facility] Paging/Directory    ______________________________________________________________________    Interval History   Reports ongoing cough, had dyspnea with activity with therapy.  Subjective chills overnight.  Poor appetite.  Reports diarrhea last evening.  Significant fatigue.      Some confusion and mild agitation overnight per RN.     Data reviewed today: I reviewed all medications, new labs and imaging results over the last 24 hours. I personally reviewed no images or EKG's today.    Physical Exam   Vital Signs: Temp: 97.7  F (36.5  C) Temp src: Oral BP: 130/49 Pulse: 69   Resp: 20 SpO2: 94 % O2 Device: Nasal cannula Oxygen Delivery: 2 LPM  Weight: 0 lbs 0 oz  General Appearance: Well nourished elderly male in NAD, lying in bed  Respiratory: bibasilar crackles, no wheezing or tachypnea  Cardiovascular: RRR, normal s1/s2 without murmurm, no peripheral edema  GI: abdomen soft, normal bowel sounds, nontender, nondistended  Other: Alert, oriented to person, place, month and year, cranial nerves grossly intact     Data   Recent Labs   Lab 10/30/20  0626 10/29/20  1130 10/29/20  0630 10/28/20  1530 10/28/20  1530   WBC 4.0 4.7  --   --  6.2   HGB 15.0 15.4  --   --  14.0   * 103*  --   --  102*   * 128*  --   --  112*   INR 1.33* 1.37*  --   --   --    *  --   --   --  134   POTASSIUM 3.8  --   --   --  4.1   CHLORIDE 102  --   --   --  102   CO2 27  --   --   --  24   BUN 21  --   --   --  23   CR 0.92  --   --   --  1.06   ANIONGAP 3  --   --   --  8   MARAH 8.4*  --   --   --  7.7*   *  --   --   --  93   ALBUMIN  --   --   --   --  3.0*   PROTTOTAL  --   --   --   --  6.4*   BILITOTAL   --   --   --   --  1.4*   ALKPHOS  --   --   --   --  79   ALT  --   --   --   --  28   AST  --   --   --   --  34   TROPI <0.015 0.015 <0.015   < > 0.029    < > = values in this interval not displayed.

## 2020-10-30 NOTE — PLAN OF CARE
Special Precautions maintained, COVID 19 +.  A&Ox4, forgetful, [place]-bilateral hearing aids at the bedside.  VSS ex mild temp.  Room air.  MULLIGAN.  Tele: 100% Apaced w/ BBB.  Up with 1, walker and gb.  Intermittently B/B incontinent.  2 BMs this shift.  R hand PIV SL.  Tolerating cardiac diet, no caffeine-poor appetite.  LS crackles.  Infrequent coughs, productive.  Skin intact, some scabbing.  Continue to monitor.

## 2020-10-31 NOTE — PLAN OF CARE
DATE & TIME: 10/30/20 3-11pm shift    Cognitive Concerns/ Orientation : A & O x2-3, disorinetated to situation. Significantly [place]. BL hearing aids charging. Forgetful.   BEHAVIOR & AGGRESSION TOOL COLOR: Green   CIWA SCORE: NA   ABNL VS/O2: VSS on 2L nasal cannula. Oxygen saturation dropped w/ activity.   MOBILITY: A1 w/ walker.   PAIN MANAGMENT: Denies  DIET: Combination Diet Low Saturated Fat Na <2400mg Diet, No Caffeine Diet.   BOWEL/BLADDER: Continent/Incontinent. Up to commode  ABNL LAB/BG: Sodium 132, CRP 40.9, Lactate dehydrogenase 366. Plt 124, D.Dimer 0.7, covid positive on 10/26 and 28.  DRAIN/DEVICES: new PIV SL  TELEMETRY RHYTHM: n/a  SKIN: WDL, small scabs   TESTS/PROCEDURES: None scheduled.   D/C DAY/GOALS/PLACE: Pending, TCU when off oxygen or place available.  OTHER IMPORTANT INFO: LS diminished, intermittent cough non productive, Bs active- reported to have diarrhea overnight- held senna.

## 2020-10-31 NOTE — PLAN OF CARE
DATE & TIME: 10/31/20  1122-9400  Cognitive Concerns/ Orientation : A&O x3-4, thought it was December. Significantly [place]. BL hearing aids charging. Forgetful.   BEHAVIOR & AGGRESSION TOOL COLOR: Green   CIWA SCORE: NA   ABNL VS/O2: VSS on 2L nasal cannula. Oxygen saturation drops w/ activity.   MOBILITY: A1 w/ walker.   PAIN MANAGMENT: Denies  DIET: Combination Diet Low Saturated Fat Na <2400mg Diet, No Caffeine Diet.   BOWEL/BLADDER: Continent/Incontinent. Up to commode, urinal at times  ABNL LAB/BG:  Plt 125, covid positive on 10/26 and 28.  DRAIN/DEVICES: new R PIV SL  TELEMETRY RHYTHM: NA  SKIN: WDL, small scabs   TESTS/PROCEDURES: None scheduled.   D/C DAY/GOALS/PLACE: Pending, TCU when off oxygen or placement available.  OTHER IMPORTANT INFO: LS diminished, intermittent cough non productive.

## 2020-10-31 NOTE — PLAN OF CARE
DATE & TIME: 10/31/20    Cognitive Concerns/ Orientation : A&O x2-3, disorinetated to situation. Significantly [place]. BL hearing aids charging. Forgetful.   BEHAVIOR & AGGRESSION TOOL COLOR: Green   CIWA SCORE: NA   ABNL VS/O2: VSS on 2L nasal cannula. Oxygen saturation dropped w/ activity.   MOBILITY: A1 w/ walker.   PAIN MANAGMENT: Denies  DIET: Combination Diet Low Saturated Fat Na <2400mg Diet, No Caffeine Diet.   BOWEL/BLADDER: Continent/Incontinent. Up to commode  ABNL LAB/BG: Sodium 132, CRP 40.9, Lactate dehydrogenase 366. Plt 124, D.Dimer 0.7, covid positive on 10/26 and 28.  DRAIN/DEVICES: new PIV SL  TELEMETRY RHYTHM: NA  SKIN: WDL, small scabs   TESTS/PROCEDURES: None scheduled.   D/C DAY/GOALS/PLACE: Pending, TCU when off oxygen or place available.  OTHER IMPORTANT INFO: LS diminished, intermittent cough non productive.  No loose stools this shift.

## 2020-10-31 NOTE — PROGRESS NOTES
[facility]    Medicine Progress Note - Hospitalist Service       Date of Admission:  10/28/2020  Assessment & Plan       Balta Drake is a 89 year old male admitted on 10/28/2020.  Past Hx of chronic A-fib. (on Eliquis), HTN, dyslipidemia and depression.  He was brought to the ED for evaluation of recent cough with SOB/MULLIGAN and falls at home.         COVID19 infection  Presents with cough with mild MULLIGAN/SOB and mild hypoxia.  COVID19 positive.  Unlikely diastolic CHF w/ Pro-BNP wnl and trop x2 negative.  Influenza/RSV PCR negative.  Procal low and CXR negative for infiltrate.  D-dimer minimally elevated at 0.6 but low suspicion for PE as chronically on Eliquis.   - initiated on remdesivir and dexamethasone 10/30; continue  - wean oxygen as able  - labs 10/31 appear stable - no significant change in inflammatory markers though coagulation studies and D-dimer slightly more abnormal    Thrombocytopenia   Admission plt 112, no history of low platelets.  Note MCV of 102.  B12 and folate wnl.  - plt stable to slightly improved 10/31     Abdominal pain  Suspect constipation.  Lactate, WBC normal.  Previous Hx of diveriticulitis.  - no complaints of abdominal pain today     HTN   Chronic A-fib  Rate controlled.  - continue PTA amiodarone and apixaban, metoprolol     Weakness with falls at home  No apparent injuries.  May have some degree of deconditioning.  - PT recommends TCU     Decreased PO intake  Ced says he has been dieting and lost about 25 # in the past several months.  Current lack of appetite likely due to COVID.   - nutrition following     Hx of depression  Appears stable and euthymic.  - not on scheduled meds for this at the moment.         Diet: Combination Diet Low Saturated Fat Na <2400mg Diet, No Caffeine Diet  Snacks/Supplements Adult: Boost Plus; With Meals    DVT Prophylaxis: apixaban  Meredith Catheter: not present  Code Status: No CPR- Do NOT Intubate  - discussed with patient  and daughter, Cassie Nguyễn, on 10/31.  Discussed low likelihood of survival if COVID progressed.  Patient states he would prefer to be DNR/DNI (states he did not previously understand the ventilator) and daughter reports this is consistent with prior conversations had with family.  Switched from full to DNR/DNI.       Disposition Plan   Expected discharge: 2 - 3 days, recommended to transitional care unit once clinically stable, safe dispo identified.  Entered: Jose Antonio Cao MD 10/31/2020, 12:04 PM       The patient's care was discussed with the Bedside Nurse, Patient and Patient's Family.    Time Spent on this Encounter   I spent >35 minutes on the unit/floor managing the care of Balta Drake. Over 50% of my time was spent on the following:   - Counseling the patient and/or family regarding: diagnostic results, prognosis and risks and benefits of treatment options  - Coordination of care with the: nurse and patient's daughter    See discussion as detailed above.    MD Jose Antonio Harrington MD  Hospitalist Service  [facility]  Contact information available via [facility] Paging/Directory    ______________________________________________________________________    Interval History   Reports less cough and less fatigue today.  Unsure about dyspnea as has not been out of bed.  No fever/chills.  Discussed code status at length and low likelihood of survival in setting of covid and he wishes to be dnr/dni.     Data reviewed today: I reviewed all medications, new labs and imaging results over the last 24 hours. I personally reviewed no images or EKG's today.    Physical Exam   Vital Signs: Temp: 97.4  F (36.3  C) Temp src: Oral BP: 91/49 Pulse: 69   Resp: 18 SpO2: 94 % O2 Device: Nasal cannula Oxygen Delivery: 2 LPM  Weight: 153 lbs 14.1 oz     General Appearance: Well nourished elderly male in NAD, lying in bed  Respiratory: bibasilar crackles unchanged, no wheezing or  tachypnea  Cardiovascular: RRR, normal s1/s2 without murmurm, no peripheral edema  GI: abdomen soft, normal bowel sounds, nontender, nondistended  Other: Alert, oriented x3 (though did not recall meeting me previously), cranial nerves grossly intact     Data   Recent Labs   Lab 10/30/20  0626 10/29/20  1130 10/29/20  0630 10/28/20  1530 10/28/20  1530   WBC 4.0 4.7  --   --  6.2   HGB 15.0 15.4  --   --  14.0   * 103*  --   --  102*   * 128*  --   --  112*   INR 1.33* 1.37*  --   --   --    *  --   --   --  134   POTASSIUM 3.8  --   --   --  4.1   CHLORIDE 102  --   --   --  102   CO2 27  --   --   --  24   BUN 21  --   --   --  23   CR 0.92  --   --   --  1.06   ANIONGAP 3  --   --   --  8   MARAH 8.4*  --   --   --  7.7*   *  --   --   --  93   ALBUMIN  --   --   --   --  3.0*   PROTTOTAL  --   --   --   --  6.4*   BILITOTAL  --   --   --   --  1.4*   ALKPHOS  --   --   --   --  79   ALT  --   --   --   --  28   AST  --   --   --   --  34   TROPI <0.015 0.015 <0.015   < > 0.029    < > = values in this interval not displayed.

## 2020-10-31 NOTE — PROGRESS NOTES
Chair alarm went off at 1800; RN found pt standing next to chair bent over attempting to pull up his brief. Pt reports that he fell on his left side and hit his head and elbow; all of which was unwitnessed. Of note, pt has been intermittently confused and forgetful; however oriented x3 upon assessment. Assessment concludes no new injuries. Pt denies pain. VSS on 5LPM NC. . House provider, Giovana HURT, notified and plans to see pt shortly.

## 2020-11-01 NOTE — PROGRESS NOTES
[facility]    RRT Note  10/31/2020   Time Called: 1830    called for: fall    Assessment & Plan   IMPRESSION & PLAN:    Balta Drake is a 89 year old male w/ PMH of chronic A-fib. (on Eliquis), HTN, dyslipidemia and depression who was admitted on 10/28/2020 for SOB, falls at home, dx w/ covid.      I was called this evening to evaluate patient for unwitnessed fall.  Patient reports that he was trying to open the door to the ENT room as someone is bringing his tray in.  He did not have his walker or his cane at.  He did have Grippi socks on.  He reports he slipped on the slippery floor and fell landing on his arm.  He denies that he hit his head or loss consciousness.  He denies any chest pain, shortness of breath, palpitations, presyncope.  On my arrival patient is sitting in the chair eating his dinner no acute distress.  He is oriented to person place time and situation, moving all extremities, fluent in speech, following commands to show 2 fingers and wiggle feet.  Vital signs are all within normal limits and glucose is 127 mg/dL.  Unfortunately patient gave different explanation of events to the RN.    Impression: Unwitnessed fall w/ inconsistent details    INTERVENTIONS:  -stat gluc as above  -vs   -pt examined, no acute MSK injury noted or cuate joint deformities appreciated, full ROM of all large joints  -Neuro checks every 4 hours.  -Although patient's exam is nonfocal right now, he is on anticoagulation with apixaban and has had multiple falls prior to admission with no recent neuro imaging (although no head strikes reported).  He verbalizes being able to start a sentence and then FDC through will forget.  Unclear at present what is his baseline.  Will obtain a noncontrast CT scan of the head    Working diagnosis:  Unwitnessed fall, no obvious injury sustained but in light of the inconsistencies in his story about the fall and whether or not he hit his head coupled with his  anticoagulated state will pursue imaging.    At the end of the evaluation CT was ordered.     Disposition:  Continue current level of care     Discussed with and defer further cares to night house provider who will follow up on CT results    Code Status: No CPR- Do NOT Intubate    Allergies   No Known Allergies    Physical Exam   Vital Signs with Ranges:  Temp:  [97.4  F (36.3  C)-98.5  F (36.9  C)] 97.5  F (36.4  C)  Pulse:  [67-70] 70  Resp:  [16-18] 18  BP: ()/(42-62) 111/59  SpO2:  [92 %-95 %] 93 %  I/O last 3 completed shifts:  In: 150 [P.O.:150]  Out: 500 [Urine:500]    Constitutional: Elderly man sitting in chair eating dinner without acute distress  Neuro: +follows commands wiggle toes and show 2 fingers bilat, face symmetric, tongue midline, speech fluent  HEENT: NC/AT, pupils equal round 3mm briskly reactive bilat, sclera nonicteric, noninjected, conjunctiva pink, mouth moist oral mucosa  Neck: supple, no pain on palpation  Heart: deferred  Lungs: deferred  Abd:deferred  Ext: no edema  MSK: Full range of motion of all large joints      Data     Troponin:    Recent Labs   Lab Test 10/30/20  0626   TROPI <0.015     CBC with Diff:  Recent Labs   Lab Test 10/31/20  1158   WBC 6.9   HGB 15.0   *   *   INR 1.65*      Comprehensive Metabolic Panel:  Recent Labs   Lab 10/31/20  1158   *   POTASSIUM 4.3   CHLORIDE 99   CO2 30   ANIONGAP 2*   *   BUN 24   CR 0.94   GFRESTIMATED 69   GFRESTBLACK 80   MARAH 8.3*   PROTTOTAL 6.9   ALBUMIN 2.9*   BILITOTAL 0.5   ALKPHOS 91   AST 45   ALT 32       UA:  Recent Labs   Lab 10/31/20  1110   COLOR Yellow   APPEARANCE Clear   URINEGLC Negative   URINEBILI Negative   URINEKETONE Negative   SG 1.027   UBLD Negative   URINEPH 5.5   PROTEIN 30*   NITRITE Negative   LEUKEST Negative   RBCU 0   WBCU 0       Time Spent on this Encounter   I spent 15 minutes on the unit/floor managing the care of Balta Drake. Over 50% of my time was spent counseling  the patient and/or coordinating care regarding services listed in this note.      Giovana Guerra [facility]  Hospitalist [place] PRIYANKA  [phone]

## 2020-11-01 NOTE — PLAN OF CARE
Summary: COVID+  Date/Time: 2:02 AM November 1, 2020    Cognitive Concerns/Orientation: alert to self  ABNL VS/O2: 2-3L O2, removes the NC  Cardiac:?WDL  Resp:?cough, Muller, LS dim  GI:?WDL  Mobility: A1 GB/W  Pain Management: denies   Diet: Cardiac  Bowel/Bladder: incontinent of urine, attempts to use urinal at bedside but has difficulty with placement  ABNL Labs/BG: low Na+  Drains/Devices:?no IV access  Telemetry Rhythm:  n/a  Skin: intact, small scabs, bruises  CMS: intact  Test Procedures: CT negative  Discharge day/Goals/Place: 1-2 days  Other Important Info: Tribe, hearing aides charging at bedside, needs frequent redirection, bed alarm on zone2  History:?  Past Medical History:   Diagnosis Date     A-fib (H) 2003 and 2005    cardioverted 2005     Anal fissure      Aortic root dilatation (H)      ASCVD (arteriosclerotic cardiovascular disease) 2003    ptca, angio 6/13/14-medical mgmt     Cardiomyopathy (H)      Colonic polyp     fu colonoscopy 2009 one polyp     Diverticulitis 2005     Hyperlipidaemia      Hypertension      Myocardial infarction (H)     S/P Unspecified     Noninfectious ileitis     diverticulitis     NSVT (nonsustained ventricular tachycardia) (H)      PMR (polymyalgia rheumatica) (H) 2001     Sick sinus syndrome (H) 2003    dual chamber pacemaker implanted 2003, generator chagne 2009     Spinal stenosis      Unspecified essential hypertension        Past Surgical History:   Procedure Laterality Date     APPENDECTOMY       APPENDECTOMY       ARTHROPLASTY HIP Right 7/7/2015    Procedure: ARTHROPLASTY HIP;  Surgeon: Marah Durham MD;  Location:  OR     ARTHROSCOPY KNEE  2003     C TOTAL HIP ARTHROPLASTY  2007    left     COLONOSCOPY      polyp     ENT SURGERY      tonsillectomy     EP PERM PACER DBLE LEAD N/A 8/13/2019    Procedure: EP Perm Pacer Double Lead;  Surgeon: Ni Thornton MD;  Location:  HEART CARDIAC CATH LAB     GRAFT BONE FROM ILIAC CREST  7/9/2014    Procedure: GRAFT BONE FROM  ILIAC CREST;  Surgeon: Jose Francisco Anderson MD;  Location:  OR     [facility] stent       HEART CATH CORONARY ANGIOGRAM W/LV GRAM  2014    chronic occlusion ramus, significant narrowing at origin of 2nd marginal branch     HERNIA REPAIR       HERNIA REPAIR       IMPLANT PACEMAKER  3/03    6/09 generator replacement     LAMINECTOMY, FUSION LUMBAR ONE LEVEL, COMBINED  2014    Procedure: COMBINED LAMINECTOMY, FUSION LUMBAR ONE LEVEL;  Surgeon: Jose Francisco Anderson MD;  Location:  OR     lumbar disc surgery       lumbar disc surgery         Family History   Problem Relation Age of Onset     Cardiovascular Father      Chronic Obstructive Pulmonary Disease Father      Heart Disease Mother      Breast Cancer Mother      Lung Cancer Brother      Other - See Comments Brother         something heart related       Social History     Tobacco Use     Smoking status: Former Smoker     Years: 20.00     Types: Cigarettes     Start date:      Quit date: 1980     Years since quittin.8     Smokeless tobacco: Never Used   Substance Use Topics     Alcohol use: Yes     Comment: 5 drinks week

## 2020-11-01 NOTE — PLAN OF CARE
A&O x4. Up with one GB and walker. Lungs dim. Tolerating diet. Voiding without difficulty. Plan is to maintain IV steroids and remdesivir. Wean O2 as able. Pt should discharge to TCU when bed verified.    17:14 EDIT: Unable to wean pt per shift. Plan is to discharge to TCU after pt completes course of remdesivir.

## 2020-11-01 NOTE — PROGRESS NOTES
[facility]    Medicine Progress Note - Hospitalist Service       Date of Admission:  10/28/2020  Assessment & Plan       Balta Drake is a 89 year old male admitted on 10/28/2020.  Past Hx of chronic A-fib. (on Eliquis), HTN, dyslipidemia and depression.  He was brought to the ED for evaluation of recent cough with SOB/MULLIGAN and falls at home.         COVID19 infection  Presents with cough with mild MULLIGAN/SOB and mild hypoxia.  COVID19 positive.  Unlikely diastolic CHF w/ Pro-BNP wnl and trop x2 negative.  Influenza/RSV PCR negative.  Procal low and CXR negative for infiltrate.  D-dimer minimally elevated at 0.6 but low suspicion for PE as chronically on Eliquis.   - initiated on remdesivir and dexamethasone 10/30; continue  - wean oxygen as able  - labs 11/1 appear stable to slightly improved    Thrombocytopenia   Admission plt 112, no history of low platelets.  Note MCV of 102.  B12 and folate wnl.  - plt wnl 11/1, will repeat tomorrow     Abdominal pain, resolved  Suspect constipation.  Lactate, WBC normal.  Previous Hx of diveriticulitis.  - no pain complaints following admission     HTN   Chronic A-fib  Rate controlled.  - continue PTA amiodarone and apixaban, metoprolol     Weakness with falls at home  No apparent injuries.  May have some degree of deconditioning.  - PT recommends TCU     Decreased PO intake  Ced says he has been dieting and lost about 25 # in the past several months.  Current lack of appetite likely due to COVID.   - nutrition following     Hx of depression  Appears stable and euthymic.  - not on scheduled meds for this at the moment.         Diet: Combination Diet Low Saturated Fat Na <2400mg Diet, No Caffeine Diet  Snacks/Supplements Adult: Boost Plus; With Meals    DVT Prophylaxis: apixaban  Meredith Catheter: not present  Code Status: No CPR- Do NOT Intubate  - see progress note 10/31 regarding change in status from admission.       Disposition Plan   Expected  discharge: 2 - 3 days, recommended to transitional care unit once clinically stable, safe dispo identified.  Entered: Jose Antonio Cao MD 11/01/2020, 1:43 PM       The patient's care was discussed with the Bedside Nurse, Patient and Patient's Family.    Jose Antonio Cao MD  Hospitalist Service  [facility]  Contact information available via [facility] Paging/Directory    ______________________________________________________________________    Interval History   Feels cough continues to improve but improvement in dyspnea, fatigue or appetite.  No fever/chills, diarrhea, myalgia, arthralgia, headache, sore throat or other complaints.      Unwitnessed fall overnight with negative head CT.     Data reviewed today: I reviewed all medications, new labs and imaging results over the last 24 hours. I personally reviewed no images or EKG's today.    Physical Exam   Vital Signs: Temp: 98.1  F (36.7  C) Temp src: Oral BP: 96/59 Pulse: 70   Resp: 18 SpO2: 91 % O2 Device: Nasal cannula Oxygen Delivery: 2.5 LPM  Weight: 154 lbs 5.15 oz     General Appearance: Well nourished elderly male in NAD, sitting up in chair  Respiratory: stable bibasilar crackles, no wheezing or tachypnea  Cardiovascular: RRR, normal s1/s2 without murmurm, no peripheral edema  GI: abdomen soft, normal bowel sounds  Other: Alert and appropriate, cranial nerves grossly intact    Data   Recent Labs   Lab 11/01/20  0555 10/31/20  1158 10/30/20  0626 10/29/20  1130 10/28/20  1530 10/28/20  1530   WBC 6.0 6.9 4.0 4.7  --  6.2   HGB 14.6 15.0 15.0 15.4  --  14.0   MCV 99 101* 101* 103*  --  102*    140* 125* 128*  --  112*   INR 1.57* 1.65* 1.33* 1.37*   < >  --     131* 132*  --   --  134   POTASSIUM 4.3 4.3 3.8  --   --  4.1   CHLORIDE 104 99 102  --   --  102   CO2 27 30 27  --   --  24   BUN 28 24 21  --   --  23   CR 0.80 0.94 0.92  --   --  1.06   ANIONGAP 5 2* 3  --   --  8   MARAH 8.4* 8.3* 8.4*  --   --  7.7*   * 114* 108*   --   --  93   ALBUMIN  --  2.9*  --   --   --  3.0*   PROTTOTAL  --  6.9  --   --   --  6.4*   BILITOTAL  --  0.5  --   --   --  1.4*   ALKPHOS  --  91  --   --   --  79   ALT  --  32  --   --   --  28   AST  --  45  --   --   --  34   TROPI <0.015  --  <0.015 0.015   < > 0.029    < > = values in this interval not displayed.

## 2020-11-02 NOTE — PROGRESS NOTES
[facility]    Medicine Progress Note - Hospitalist Service       Date of Admission:  10/28/2020  Assessment & Plan       Balta Drake is a 89 year old male admitted on 10/28/2020.  Past Hx of chronic A-fib. (on Eliquis), HTN, dyslipidemia and depression.  He was brought to the ED for evaluation of recent cough with SOB/MULLIGAN and falls at home.         COVID19 infection  Presents with cough with mild MULLIGAN/SOB and mild hypoxia.  COVID19 positive.  Unlikely diastolic CHF w/ Pro-BNP wnl and trop x2 negative.  Influenza/RSV PCR negative.  Procal low and CXR negative for infiltrate.  D-dimer minimally elevated at 0.6 but low suspicion for PE as chronically on Eliquis.   - initiated on remdesivir (day 4/5) and dexamethasone (day 4/10) on 10/30; continue  - increased oxygen needs overnight with increased crackles on exam 11/2, will check procal  - labs 11/2 pending  - symptomatic orthostasis 11/2 with dry mucous membranes and borderline hypotension, will infuse  ml over 5 hours  - discussed potential transfer to [facility] with patient who ultimately defers to his daughter, Cassie (POA) - discussed with her 11/2 and she will talk with her 6 other siblings and likely have a decision tomorrow    ADDENDUM:  New leukocytosis of 14 today with left shift, possibly related to steroids and procal negative, but with increasing oxygen needs will empirically start zosyn.      Thrombocytopenia   Admission plt 112, no history of low platelets.  Note MCV of 102.  B12 and folate wnl.  - plt wnl 11/1, labs pending today     Abdominal pain, resolved  Suspect constipation.  Lactate, WBC normal.  Previous Hx of diveriticulitis.  - no pain complaints following admission     HTN   Chronic A-fib  Rate controlled.  - continue PTA amiodarone and apixaban, metoprolol     Weakness with falls at home  No apparent injuries.  May have some degree of deconditioning.  - PT recommends TCU     Decreased PO intake  Ecd says he  has been dieting and lost about 25 # in the past several months.  Current lack of appetite likely due to COVID.   - nutrition following     Hx of depression  Appears stable and euthymic.  - not on scheduled meds for this at the moment.         Diet: Combination Diet Low Saturated Fat Na <2400mg Diet, No Caffeine Diet  Snacks/Supplements Adult: Boost Plus; With Meals    DVT Prophylaxis: apixaban  Meredith Catheter: not present  Code Status: No CPR- Do NOT Intubate  - see progress note 10/31 regarding change in status from admission.       Disposition Plan   Expected discharge: 2 - 3 days, recommended to transitional care unit once clinically stable, safe dispo identified.  Entered: Jose Antonio Cao MD 11/02/2020, 11:23 AM       The patient's care was discussed with the Bedside Nurse, Patient and Patient's Family.    Jose Antonio Cao MD  Hospitalist Service  [facility]  Contact information available via [facility] Paging/Directory    ______________________________________________________________________    Interval History   Denies any increase in dyspnea or cough today but has increased oxygen requirements.  Denies fever/chills.  Initially told me no lightheadedness with standing, but RN reports he complained of this to her.  Feels dry.    Data reviewed today: I reviewed all medications, new labs and imaging results over the last 24 hours. I personally reviewed no images or EKG's today.    Physical Exam   Vital Signs: Temp: 97.5  F (36.4  C) Temp src: Oral BP: 110/52 Pulse: 69   Resp: 16 SpO2: 94 % O2 Device: Nasal cannula Oxygen Delivery: 4 LPM  Weight: 154 lbs 5.15 oz     General Appearance: Well nourished elderly male in NAD, lying in bed, appearing fatigued  Respiratory: bibasilar crackles increased today, no wheezing or tachypnea  Cardiovascular: RRR, normal s1/s2 without murmurm, no peripheral edema  GI: abdomen soft, normal bowel sounds  Other: Alert and appropriate, cranial nerves grossly  intact    Data   Recent Labs   Lab 11/01/20  0555 10/31/20  1158 10/30/20  0626 10/29/20  1130 10/28/20  1530 10/28/20  1530   WBC 6.0 6.9 4.0 4.7  --  6.2   HGB 14.6 15.0 15.0 15.4  --  14.0   MCV 99 101* 101* 103*  --  102*    140* 125* 128*  --  112*   INR 1.57* 1.65* 1.33* 1.37*   < >  --     131* 132*  --   --  134   POTASSIUM 4.3 4.3 3.8  --   --  4.1   CHLORIDE 104 99 102  --   --  102   CO2 27 30 27  --   --  24   BUN 28 24 21  --   --  23   CR 0.80 0.94 0.92  --   --  1.06   ANIONGAP 5 2* 3  --   --  8   MARAH 8.4* 8.3* 8.4*  --   --  7.7*   * 114* 108*  --   --  93   ALBUMIN  --  2.9*  --   --   --  3.0*   PROTTOTAL  --  6.9  --   --   --  6.4*   BILITOTAL  --  0.5  --   --   --  1.4*   ALKPHOS  --  91  --   --   --  79   ALT  --  32  --   --   --  28   AST  --  45  --   --   --  34   TROPI <0.015  --  <0.015 0.015   < > 0.029    < > = values in this interval not displayed.

## 2020-11-02 NOTE — PLAN OF CARE
DATE & TIME: 11/2/2020 7-3pm  Cognitive Concerns/ Orientation: A&O x2-3; disoriented to time. Significantly [place]. BL hearing aids charging at bedside. Forgetful.   BEHAVIOR & AGGRESSION TOOL COLOR: Green   CIWA SCORE: NA   ABNL VS/O2: orthostatic BP changes this am/500cc fluid bolus over 5 hours. On 4L via nasal cannula. Oxygen saturation drops w/ activity. Intermittently removing oxygen.  MOBILITY: A1 w/ walker, got up to bathroom with sba/belt/walker with increased dizziness so bedrest since this am.    PAIN MANAGMENT: Denies  DIET: Combination Diet Low Saturated Fat Na <2400mg Diet, No Caffeine Diet. Poor appetite.  BOWEL/BLADDER: Continent/Incontinent. Up to commode, urinal at times  ABNL LAB/BG: COVID positive. CRP18.7   DRAIN/DEVICES: new R PIV SL  TELEMETRY RHYTHM: NA  SKIN: WDL, small scabs   TESTS/PROCEDURES: None scheduled.   D/C DAY/GOALS/PLACE: Neuro's intact/forgetful. Pt should discharge to TCU when bed verified/possible [facility] for covid. Daughter Araceli notified SW will be in contact for post-discharge planning. Isolation precautions maintained. Bed jumping frequently overnight. Needs zone two bed alarm and chair alarm at all times! PRN Seroquel given on nights as pt repeatdly setting of bed alarm and reporting difficulty sleeping, none given on day shift.

## 2020-11-02 NOTE — PLAN OF CARE
DATE & TIME: 11/1/2020 1900-0730  Cognitive Concerns/ Orientation: A&O x3; disoriented to time. Significantly [place]. BL hearing aids charging at bedside. Forgetful.   BEHAVIOR & AGGRESSION TOOL COLOR: Green   CIWA SCORE: NA   ABNL VS/O2: VSS on 4L via nasal cannula. Oxygen saturation drops w/ activity. Intermittently removing oxygen and sensor overnight; difficult to apprioprately assess at times d/t pt removing equipment frequently. Sats maintained 90-94% on 3-4L via NC. Wean as able.   MOBILITY: A1 w/ walker.   PAIN MANAGMENT: Denies  DIET: Combination Diet Low Saturated Fat Na <2400mg Diet, No Caffeine Diet.   BOWEL/BLADDER: Continent/Incontinent. Up to commode, urinal at times  ABNL LAB/BG:  Plt 125, COVID positive. CRP 25.5, Ca+ 8.4, D-dimer 0.8, INR 1.57  DRAIN/DEVICES: new R PIV SL  TELEMETRY RHYTHM: NA  SKIN: WDL, small scabs   TESTS/PROCEDURES: None scheduled.   D/C DAY/GOALS/PLACE: Pt should discharge to TCU when bed verified. Daughter Araceli notified SW will be in contact for post-discharge planning. Isolation precautions maintained. Bed jumping frequently overnight. Needs zone two bed alarm and chair alarm at all times! PRN Seroquel given as pt repeatdly setting of bed alarm and reporting difficulty sleeping.

## 2020-11-02 NOTE — PROVIDER NOTIFICATION
MD Notification    Notified Person: MD    Notified Person Name: Dr Cao    Notification Date/Time:11/2/20 2234    Notification Interaction:web paGE    Purpose of Notification: orthostatic bp/p dropped after standing (see vitals)    Orders Received: Do you want IVF?    Comments:

## 2020-11-02 NOTE — PROGRESS NOTES
CLINICAL NUTRITION SERVICES - REASSESSMENT NOTE      RECOMMENDATIONS FOR MD/PROVIDER TO ORDER:   - Consider Nutrition Support in the next couple days if intakes do not improve (IF in alignment with goals of care)    Recommendations Ordered by Registered Dietitian (RD):   - continue supplements + meals TID   thera vit M daily   - liberalized diet to regular, no caffeine (per staff rec - MD to sign)    Malnutrition:   % Weight Loss:  > 10% in 6 months (severe malnutrition)  % Intake:  </= 50% for >/= 5 days (severe malnutrition)  Subcutaneous Fat Loss:  Unable  Muscle Loss:  Unable   Fluid Retention:  None noted    Malnutrition Diagnosis: Severe malnutrition  In Context of:  Acute illness or injury  Chronic illness or disease     EVALUATION OF PROGRESS TOWARD GOALS   Diet: LSF, Na <2400 mg + Boost Plus TID w/ meals   Intake/Tolerance:   - Flowsheet documentation of meals shows 25-50% intake at best. Ced received 2 meals yesterday, 1 meal on 10/31, 2 on both 10/30 and 10/29.   - Yesterday in meals alone received just 975 kcal and 29 g pro w/ 25% intake recorded.   - Unable to speak with patient over the phone given his [place].     ASSESSED NUTRITION NEEDS:  Dosing Weight: 70 kg   Estimated Energy Needs: 0597-0365 kcal (25-30 Kcal/Kg)  Justification: maintenance  Estimated Protein Needs:  (1.2-1.5 g pro/Kg)   Justification: maintenance / repletion   Estimated Fluid Needs: 1 mL/kcal  Justification: maintenance    NEW FINDINGS:   - Weight trendin kg   26# loss in the past 6 months (14.4%).   Wt Readings from Last 10 Encounters:   20 70 kg (154 lb 5.2 oz)   20 81.6 kg (180 lb)   19 75.9 kg (167 lb 6.4 oz)   19 75.9 kg (167 lb 6.4 oz)   19 81.6 kg (180 lb)   19 81.4 kg (179 lb 6.4 oz)   18 84 kg (185 lb 3.2 oz)   18 88.5 kg (195 lb)   10/03/17 90.1 kg (198 lb 9.6 oz)   17 90.3 kg (199 lb)     - Labs reviewed:   CRP 18.7 (H, trending down)   Lactate  Dehydrogenase 633 (H)  - Meds reviewed:   Thera vit M  Miralax  - Stooling:   Last BM x1 on 11/01    Previous Goals:   Intake of >/=75% meals BID-TID.  Evaluation: Not met    Previous Nutrition Diagnosis:   Predicted inadequate nutrient intake (energy/protein) related to COVID+ Status, likely to have decreased intakes as a result  Evaluation: Declining      MALNUTRITION  % Weight Loss:  > 10% in 6 months (severe malnutrition)  % Intake:  </= 50% for >/= 5 days (severe malnutrition)  Subcutaneous Fat Loss:  Unable  Muscle Loss:  Unable   Fluid Retention:  None noted    Malnutrition Diagnosis: Severe malnutrition  In Context of:  Acute illness or injury  Chronic illness or disease    CURRENT NUTRITION DIAGNOSIS  Inadequate oral intake related to poor appetite in acute illness as evidenced by intakes of 25-50% at best over 5-day admission.     INTERVENTIONS  Recommendations / Nutrition Prescription  Diet as ordered + Boost Plus TID w/ meals   Thera vit M daily     Consider Nutrition Support in the next couple days if intakes do not improve (IF in alignment with goals of care)     - liberalized diet to regular (per staff ref - MD to sign)     Implementation  None new. Supplements and micronutrients already ordered     Goals  Intake of >50% meals at least 2x daily.       MONITORING AND EVALUATION:  Progress towards goals will be monitored and evaluated per protocol and Practice Guidelines    Vianca Houser RD, LD  [facility] [facility], [facility]   Pager: [phone]  Weekend Pager: [phone]

## 2020-11-02 NOTE — PROGRESS NOTES
"Pt states at night he is a \"mouth breather\", pt switched to oxymask. Pt continues to intermittently remove oxygen monitoring probe and mask. Intermittently getting abnormal readings d/t pt removing equipment. Pt educated and nursing will continue to round and monitor Sp02 monitoring.     Edit: pt contionously taking off oxymask during night and setting off bed alarm d/t disorientation. Pt switched back to NC. Still needing supplemental oxygen currently. Nursing will continue to monitor and wean as able.   "

## 2020-11-03 NOTE — PLAN OF CARE
DATE & TIME: 11/3/2020 7860-4900  Cognitive Concerns/ Orientation: A&O x4, very forgetful. Significantly [place], BL hearing aids charging at bedside.   BEHAVIOR & AGGRESSION TOOL COLOR: Green   CIWA SCORE: NA   ABNL VS/O2: Orthostatics positive yesterday AM, 500cc fluid bolus over 5 hours infused. On 4-6L via oxymask, sats 90-93. Oxygen saturation drops w/ activity. Switched to ear probe, poor circulation in hands. Intermittently removing oxygen. Frequent rounding. Oxymask for at night, mouth breather.   MOBILITY: Ax1-2 GB/W, reports dizziness at times when standing.   PAIN MANAGMENT: Denies   DIET: Regular, No Caffeine Diet. Poor appetite.  BOWEL/BLADDER: Incontinent.  ABNL LAB/BG: COVID positive, CRP18.7, WBC 14.6, Ddimer 0.7 ,lactate dehydrogenase 633.  DRAIN/DEVICES: new R PIV SL  TELEMETRY RHYTHM: NA  SKIN: Bruised, small scabs   TESTS/PROCEDURES: None scheduled.   D/C DAY/GOALS/PLACE: Neuro's intact. Pt should discharge to TCU when bed verified/possible  [place] for covid. Special precautions maintained. Needs zone two bed alarm and chair alarm at all times!

## 2020-11-03 NOTE — PROGRESS NOTES
[facility]    Medicine Progress Note - Hospitalist Service       Date of Admission:  10/28/2020  Assessment & Plan   Balta Drake is a 89 year old male admitted on 10/28/2020.  Past medical history of chronic atrial fibrillation on apixaban, hypertension, dyslipidemia and depression.  He was brought to the ED for evaluation of recent cough with shortness of breath and dyspnea on exertion, with falls at home.       COVID19 infection  Acute hypoxic respiratory failure  Presents with cough with mild MULLIGAN/SOB and mild hypoxia.  COVID19 positive.  Unlikely diastolic CHF w/ Pro-BNP wnl and trop x2 negative.  Influenza/RSV PCR negative.  Procal low and CXR negative for infiltrate.  D-dimer minimally elevated at 0.6, but low suspicion for PE as chronically on apixaban.   - Complete 5 day course of remdesivir 11/3  - Continue dexamethasone through 11/8  - No beds available at [facility], current wait list 20+ patients long. Regardless family has decided against transfer.   - WBC increasing 11/3  - Continues piperacillin-tazobactam IV empirically   - Wean oxygen as able  - Judicious use of fluids    Orthostatic hypotension  - Bolus 500 ml x1  - Hold metoprolol XL while orthostatics remain positive   - Continue to monitor     Thrombocytopenia   Admission plt 112, no history of low platelets.  Note MCV of 102.  B12 and folate wnl. Plt wnl 11/1.  - Platelet count normal 11/3     Abdominal pain, resolved  Suspect constipation.  Lactate, WBC normal.  Previous Hx of diveriticulitis. No pain complaints following admission.     Hypertension    Chronic atrial fibrillation   Rate controlled.  - Continue PTA amiodarone and apixaban  - Hold parameters on metoprolol XL if remains orthostatic      Weakness with falls at home  No apparent injuries.  May have some degree of deconditioning.  - PT recommends TCU     Decreased PO intake  Ced says he has been dieting and lost about 25 # in the past several months.   Current lack of appetite likely due to COVID.   - Nutrition following     Hx of depression  Appears stable and euthymic. Not on scheduled meds for this at the moment.    Diet: Snacks/Supplements Adult: Boost Plus; With Meals  Combination Diet Regular Diet Adult; No Caffeine Diet    DVT Prophylaxis: Apixaban   Meredith Catheter: not present  Code Status: No CPR- Do NOT Intubate      Disposition Plan   Expected discharge: Unclear, pending stable oxygen needs, placement.   Entered: Bruno Sandhu MD 11/03/2020, 11:30 AM       The patient's care was discussed with the Patient, bedside RN and daughter Cassie.    Bruno Sandhu MD  Hospitalist Service  [facility]    ______________________________________________________________________    Interval History   No acute events overnight. Denies any shortness of breath. Continues to have intermittent productive cough. Orthostatic vital signs again positive, though patient did not report symptoms to RN. Patient denies any pain.     Data reviewed today: I reviewed all medications, new labs and imaging results over the last 24 hours. I personally reviewed no images or EKG's today.    Physical Exam   Vital Signs: Temp: 98  F (36.7  C) Temp src: Axillary BP: 110/59 Pulse: 70   Resp: 18 SpO2: 93 % O2 Device: Oxymizer cannula Oxygen Delivery: 6 LPM  Weight: 156 lbs 1.37 oz    Constitutional:  NAD  Respiratory: Inspiratory crackles at bilateral bases, no wheezes, breathing appears comfortable at rest   Cardiovascular: RRR. No peripheral edema.  GI: Soft, non-tender, non-distended.    Skin/Integumen: Warm, dry  Neuro: Alert. Extremely hard of hearing. Answering questions appropriately and following commands.      Data   Recent Labs   Lab 11/03/20  1124 11/02/20  1244 11/01/20  0555 10/30/20  0626 10/30/20  0626 10/29/20  1130   WBC 20.8* 14.6* 6.0   < > 4.0 4.7   HGB 13.7 14.2 14.6   < > 15.0 15.4   MCV 99 101* 99   < > 101* 103*    174 160   < > 125*  128*   INR 2.77* 2.12* 1.57*   < > 1.33* 1.37*    133 136   < > 132*  --    POTASSIUM 4.1 4.6 4.3   < > 3.8  --    CHLORIDE 105 101 104   < > 102  --    CO2 25 29 27   < > 27  --    BUN 27 33* 28   < > 21  --    CR 0.84 0.92 0.80   < > 0.92  --    ANIONGAP 7 3 5   < > 3  --    MARAH 8.3* 8.1* 8.4*   < > 8.4*  --    * 186* 106*   < > 108*  --    ALBUMIN 2.6* 2.7*  --    < >  --   --    PROTTOTAL 6.0* 6.3*  --    < >  --   --    BILITOTAL 1.2 0.6  --    < >  --   --    ALKPHOS 102 97  --    < >  --   --    ALT 31 35  --    < >  --   --    AST 38 46*  --    < >  --   --    TROPI  --   --  <0.015  --  <0.015 0.015    < > = values in this interval not displayed.       No results found for this or any previous visit (from the past 24 hour(s)).  Medications     - MEDICATION INSTRUCTIONS -       - MEDICATION INSTRUCTIONS -         amiodarone  100 mg Oral Daily     apixaban ANTICOAGULANT  5 mg Oral BID     dexamethasone (DECADRON) intermittent infusion  6 mg Intravenous Daily     ezetimibe  10 mg Oral Daily     fluticasone  1 spray Both Nostrils Daily     melatonin  3 mg Oral At Bedtime     metoprolol succinate ER  50 mg Oral Daily     multivitamin w/minerals  1 tablet Oral Daily     piperacillin-tazobactam  4.5 g Intravenous Q6H     polyethylene glycol  17 g Oral Daily     remdesivir  100 mg Intravenous Q24H     rosuvastatin  20 mg Oral Daily     senna-docusate  1 tablet Oral BID    Or     senna-docusate  2 tablet Oral BID     sodium chloride (PF)  3 mL Intracatheter Q8H     tamsulosin  0.4 mg Oral Daily

## 2020-11-03 NOTE — PROVIDER NOTIFICATION
MD Notification    Notified Person: MD    Notified Person Name: Dr. Velazquez    Notification Date/Time: 11/3/2020, 0140     Notification Interaction: Called on call hospitalist service,     Purpose of Notification: Patient's increased oxygen needs from 4-6L to 10L and increased confusion     Orders Received: no new orders, 02 sats okay    Comments:         Addendum: obtained ear probe, patient has poor circulation in hands, satting 96%  On 4L with ear probe 0200

## 2020-11-03 NOTE — PLAN OF CARE
DATE & TIME: 11/3/2020 4517-8220  Cognitive Concerns/ Orientation: A&O x4, very forgetful, some confusion at times. Significantly [place], hearing aids in.   BEHAVIOR & AGGRESSION TOOL COLOR: Green   CIWA SCORE: NA   ABNL VS/O2: VSS on 5-6 LPM oximizer canula. Orthostatics +, given 500 ml bolus, pt asymptomatic. Metoprolol held. Desats with activity-takes several minutes to recover. Ear probe as circulation in hands are poor.   MOBILITY: Ax1-2 GB/walker, up to chair today x2  PAIN MANAGMENT: Denies   DIET: Regular, no Caffeine Diet. Poor appetite  BOWEL/BLADDER: Incontinent.  ABNL LAB/BG: COVID positive, CRP 68.3, WBC 20.8, Ddimer 0.7 ,  DRAIN/DEVICES: R PIV SL  TELEMETRY RHYTHM: NA  SKIN: Bruised, small scabs   TESTS/PROCEDURES: None scheduled.   D/C DAY/GOALS/PLACE: unknown- +COVID and there is a wait list at [facility]. Possibly to TCU when medically stable, weaned oxygen  Other: Neuros intact. MULLIGAN, frequent productive cough, LS dim/coarse. Continue IV zosyn, Remdesivir and dexamethasone.

## 2020-11-03 NOTE — PLAN OF CARE
DATE & TIME: 11/2/2020 7039-4193  Cognitive Concerns/ Orientation: A&O x4, very forgetful. Significantly [place], BL hearing aids charging at bedside.   BEHAVIOR & AGGRESSION TOOL COLOR: Green   CIWA SCORE: NA   ABNL VS/O2: Orthostatics positive this AM, 500cc fluid bolus over 5 hours infused. On 4-6L via nasal cannula/oxymask. Oxygen sats drop to 70-80s when on room air. Switched to oxymizer cannula, 4-5 LPM, sats 90-93. Oxygen saturation drops w/ activity. Intermittently removing oxygen. Oxymask for at night, mouth breather.   MOBILITY: Ax1-2 GB/W, reports dizziness at times when standing.   PAIN MANAGMENT: Denies   DIET: Regular, No Caffeine Diet. Poor appetite.  BOWEL/BLADDER: Continent/Incontinent. Up to commode, urinal at times.  ABNL LAB/BG: COVID positive, CRP18.7, WBC 14.6, Ddimer 0.7 ,lactate dehydrogenase 633.  DRAIN/DEVICES: new R PIV SL  TELEMETRY RHYTHM: NA  SKIN: Bruised, small scabs   TESTS/PROCEDURES: None scheduled.   D/C DAY/GOALS/PLACE: Neuro's intact. Pt should discharge to TCU when bed verified/possible [facility] covid. Special precautions maintained. Needs zone two bed alarm and chair alarm at all times!

## 2020-11-04 NOTE — PLAN OF CARE
DATE & TIME: 11/4/2020 2988-5588  Cognitive Concerns/ Orientation: A&O x3, disoriented to situation. [place], bilateral hearing aides in place.  BEHAVIOR & AGGRESSION TOOL COLOR: Green   CIWA SCORE: NA   ABNL VS/O2: VSS, weaned to 3LPM oximizer canula or oxymask when sleeping/ in bed. Use ear probe for oxygenation monitoring.   MOBILITY: Ax1 GB/walker, up to chair for meals.  PAIN MANAGMENT: Denies pain.   DIET: Regular, No Caffeine Diet. Good appetite.  BOWEL/BLADDER: Incontinent.  ABNL LAB/BG: COVID positive, WBC 17.0 from 20.8. INR 2.77.  DRAIN/DEVICES: New R PIV saline locked.  TELEMETRY RHYTHM: NA  SKIN: Bruised, small scabs   TESTS/PROCEDURES: None scheduled.   D/C DAY/GOALS/PLACE: unknown- +COVID and wait list at [facility]. Possibly to TCU when medically stable?  Other: Connected  with patient. Neuros discontinued. MULLIGAN, frequent productive cough, LS dim/fine crackles in bases. Continue IV zosyn and dexamethasone. Nursing will continue to monitor.

## 2020-11-04 NOTE — PROGRESS NOTES
[facility]    Medicine Progress Note - Hospitalist Service       Date of Admission:  10/28/2020  Assessment & Plan   Balta Drake is a 89 year-old male admitted on 10/28/2020.  Past medical history of chronic atrial fibrillation on apixaban, hypertension, dyslipidemia and depression.  He was brought to the ED for evaluation of recent cough with shortness of breath and dyspnea on exertion, with falls at home.       COVID19 infection  Acute hypoxic respiratory failure  Presents with cough with mild MULLIGAN/SOB and mild hypoxia.  COVID19 positive.  Unlikely diastolic CHF w/ Pro-BNP wnl and trop x2 negative.  Influenza/RSV PCR negative.  Procal low and CXR negative for infiltrate.  D-dimer minimally elevated at 0.6, but low suspicion for PE as chronically on apixaban. Completed 5-day course of remdesivir 11/3.  - Continue dexamethasone through 11/8  - Family has declined transfer to [facility].   - WBC pending 11/4  - Continues piperacillin-tazobactam IV empirically   - Wean oxygen as able, has been fluctuating   - Judicious use of fluids    Orthostatic hypotension  Orthostatic vital signs positive 11/3/20, received bolus 500 ml x1  - Hold metoprolol XL while orthostatics remain positive   - Continue to monitor orthostatic vital signs     Thrombocytopenia   Admission plt 112, no history of low platelets.  Note MCV of 102.  B12 and folate wnl. Plt wnl 11/1.  - Platelet count pending 11/4     Abdominal pain, resolved  Suspect constipation.  Lactate, WBC normal.  Previous history of diveriticulitis. No pain complaints following admission.     Hypertension    Chronic atrial fibrillation   Rate controlled.  - Continue PTA amiodarone and apixaban  - Hold parameters on metoprolol XL if remains orthostatic      Weakness with falls at home  No apparent injuries.  May have some degree of deconditioning.  - PT recommends TCU     Decreased PO intake  Ced says he has been dieting and lost about 25# in the  past several months.  Current lack of appetite likely due to COVID.   - Nutrition following     Hx of depression  Appears stable and euthymic. Not on scheduled meds for this at the moment.    Diet: Snacks/Supplements Adult: Boost Plus; With Meals  Combination Diet Regular Diet Adult; No Caffeine Diet    DVT Prophylaxis: Apixaban   Meredith Catheter: not present  Code Status: No CPR- Do NOT Intubate      Disposition Plan   Expected discharge: Unclear, pending stable oxygen needs, placement.   Entered: Bruno Sandhu MD 11/04/2020, 12:32 PM       The patient's care was discussed with the Patient and daughter Cassie.    Bruno Sandhu MD  Hospitalist Service  [facility]    ______________________________________________________________________    Interval History   No acute events overnight. Denies any shortness of breath or other new symptoms. Up sitting in chair at the bedside.     Data reviewed today: I reviewed all medications, new labs and imaging results over the last 24 hours. I personally reviewed no images or EKG's today.    Physical Exam   Vital Signs: Temp: 97.7  F (36.5  C) Temp src: Axillary BP: 111/54 Pulse: 70   Resp: 22 SpO2: 95 % O2 Device: Oxymizer cannula Oxygen Delivery: 3 LPM  Weight: 156 lbs 1.37 oz    Constitutional:  NAD  Respiratory: Non-labored breathing at rest, breathing comfortably on oxygen   Cardiovascular: RRR. No peripheral edema.  GI: Soft, non-tender, non-distended.    Skin/Integumen: Warm, dry  Neuro: Alert. Extremely hard of hearing. Answering questions appropriately and following commands.      Data   Recent Labs   Lab 11/03/20  1124 11/02/20  1244 11/01/20  0555 10/30/20  0626 10/30/20  0626 10/29/20  1130   WBC 20.8* 14.6* 6.0   < > 4.0 4.7   HGB 13.7 14.2 14.6   < > 15.0 15.4   MCV 99 101* 99   < > 101* 103*    174 160   < > 125* 128*   INR 2.77* 2.12* 1.57*   < > 1.33* 1.37*    133 136   < > 132*  --    POTASSIUM 4.1 4.6 4.3   < > 3.8  --     CHLORIDE 105 101 104   < > 102  --    CO2 25 29 27   < > 27  --    BUN 27 33* 28   < > 21  --    CR 0.84 0.92 0.80   < > 0.92  --    ANIONGAP 7 3 5   < > 3  --    MARAH 8.3* 8.1* 8.4*   < > 8.4*  --    * 186* 106*   < > 108*  --    ALBUMIN 2.6* 2.7*  --    < >  --   --    PROTTOTAL 6.0* 6.3*  --    < >  --   --    BILITOTAL 1.2 0.6  --    < >  --   --    ALKPHOS 102 97  --    < >  --   --    ALT 31 35  --    < >  --   --    AST 38 46*  --    < >  --   --    TROPI  --   --  <0.015  --  <0.015 0.015    < > = values in this interval not displayed.       No results found for this or any previous visit (from the past 24 hour(s)).  Medications     - MEDICATION INSTRUCTIONS -       - MEDICATION INSTRUCTIONS -         amiodarone  100 mg Oral Daily     apixaban ANTICOAGULANT  5 mg Oral BID     dexamethasone (DECADRON) intermittent infusion  6 mg Intravenous Daily     ezetimibe  10 mg Oral Daily     fluticasone  1 spray Both Nostrils Daily     melatonin  3 mg Oral At Bedtime     metoprolol succinate ER  50 mg Oral Daily     multivitamin w/minerals  1 tablet Oral Daily     piperacillin-tazobactam  4.5 g Intravenous Q6H     polyethylene glycol  17 g Oral Daily     rosuvastatin  20 mg Oral Daily     senna-docusate  1 tablet Oral BID    Or     senna-docusate  2 tablet Oral BID     sodium chloride (PF)  3 mL Intracatheter Q8H     tamsulosin  0.4 mg Oral Daily

## 2020-11-04 NOTE — PLAN OF CARE
"DATE & TIME: 11/4/2020 4569-3895  Cognitive Concerns/ Orientation: A&O fluctuating, very forgetful, confusion and agitation later in shift, frequently requesting to \"get out of here\" reorientation given. Significantly [place], hearing aids in.   BEHAVIOR & AGGRESSION TOOL COLOR: Green   CIWA SCORE: NA   ABNL VS/O2: VSS on 6-7 LPM oximizer canula or oxymask when sleeping/ in bed. Ear probe as circulation in hands are poor.   MOBILITY: Ax1 GB/walker, up to chair today  PAIN MANAGMENT: Tylenol x1 given for L hip pain, serequlx1 for agitation    DIET: Regular, No Caffeine Diet. Poor appetite  BOWEL/BLADDER: Incontinent.  ABNL LAB/BG: COVID positive, CRP 68.3, WBC 20.8, Ddimer 0.7 ,  DRAIN/DEVICES: R PIV SL  TELEMETRY RHYTHM: NA  SKIN: Bruised, small scabs   TESTS/PROCEDURES: None scheduled.   D/C DAY/GOALS/PLACE: unknown- +COVID and wait list at [facility]. Possibly to TCU when medically stable?  Other: Neuros intact. [place], frequent productive cough, LS dim/crackles in bases. Continue IV zosyn, Remdesivir and dexamethasone.   "

## 2020-11-04 NOTE — PROGRESS NOTES
SPIRITUAL HEALTH SERVICES: Tele-Encounter  Patient Location: 66  Spoke with: Ptnt, Bedside Nurse, Yarsanilinda Leon rep, [facility] rep    Referral Source: Hospital  request. Ptnt's daughter reaching out to  , Abner leon, [facility]    DATA:  Daughter phoned  line and spoke with staff emanuel Odell, who let me know of family's concern to have Ptnt receive Sacrament of Annointing of the Sick. Cass explained to Daughter the hospital policy of no outside clergy unless Ptnt actively dying, and she offered emotional support.    Emilie Bruno -  phoned me directly to inquire about the desire for a  to visit Ptnt, as Daughter had called there. We confirmed that both [facility] and hospital policy is to not have outside clergy visit unless Ptnt is actively dying, and then [facility]  [facility] for Covid+ might be allowed in to see Ptnt.     I spoke with Bedside Nurse Brooke, who confirmed Ptnt is not actively dying, and Brooke told me a family member had phoned asking if an IPad could be set up for a [place] visit.     I arranged with Brooke to telephone Ptnt when she was in the room to hold phone for Ptnt, and I spoke briefly with Ptnt.    I offered emotional support and said a prayer and blessing for Ptnt.    I contacted [facility] and spoke with Paige, who was familiar with Ptnt. I shared that I had visited Ptnt by phone, and I explained the policy of not being able to allow an outside  to visit unless Ptnt is actively dying.    PLAN:   continues to be available.    Cora Sheridan  Chaplain Resident      ______________________________    Type of service:  Telephone Visit     has received verbal consent for a TelephoneVisit from the patient? Yes    Distance Provider Location: designated [place] office or home office (secure setting)    Mode of Communication: telephone (via [employer] phone or [employer] tele-call-number  ([phone]))

## 2020-11-04 NOTE — TELEPHONE ENCOUNTER
Son Allen is calling and is requesting to speak with a manager regarding the phone system and also about allowing to get into [facility] one person to see his 89 year old father.  Allen was told by hospital no visitors.  FNA transferred caller through to Manager.    COVID 19 Nurse Triage Plan/Patient Instructions    Please be aware that novel coronavirus (COVID-19) may be circulating in the community. If you develop symptoms such as fever, cough, or SOB or if you have concerns about the presence of another infection including coronavirus (COVID-19), please contact your health care provider or visit [email or web address].     Disposition/Instructions    Home care recommended. Follow home care protocol based instructions.    Thank you for taking steps to prevent the spread of this virus.  o Limit your contact with others.  o Wear a simple mask to cover your cough.  o Wash your hands well and often.    Resources    [facility]: About COVID-19: [email or web address]    CDC: What to Do If You're Sick: www.cdc.gov/coronavirus/2019-ncov/about/steps-when-sick.html    CDC: Ending Home Isolation: www.cdc.gov/coronavirus/2019-ncov/hcp/disposition-in-home-patients.html     CDC: Caring for Someone: www.cdc.gov/coronavirus/2019-ncov/if-you-are-sick/care-for-someone.html     [facility]: Interim Guidance for Hospital Discharge to Home: www.health.[facility].mn.us/diseases/coronavirus/hcp/hospdischarge.pdf    [facility] clinical trials (COVID-19 research studies): clinicalaffairs.[facility]/umn-clinical-trials     Below are the COVID-19 hotlines at the [facility] ([facility]). Interpreters are available.   o For health questions: Call [phone] or 1-405.955.3139 (7 a.m. to 7 p.m.)  o For questions about schools and childcare: Call 160-253-1441 or 1-224.714.9586 (7 a.m. to 7 p.m.)                       Additional Information    Negative: Nursing judgment, per information in Reference    Negative:  Information only call about a Well Adult (no illness or injury)    Negative: Nursing judgment or information in reference    Negative: Nursing judgment or information in reference    Negative: Nursing judgment or information in reference    Negative: Nursing judgment or information in reference    Negative: Nursing judgment or information in reference    Negative: Nursing judgment or information in reference    Negative: Nursing judgment or information in reference    Negative: Nursing judgment or information in reference    Negative: Nursing judgment or information in reference    Negative: Nursing judgment or information in reference    Negative: Nursing judgment or information in reference    Negative: Nursing judgment or information in reference    Negative: Nursing judgment or information in reference    Nursing judgment or information in reference    Protocols used: NO GUIDELINE [id number]

## 2020-11-04 NOTE — PLAN OF CARE
DATE & TIME: 11/3/2020 9705-9336  Cognitive Concerns/ Orientation: A&O x4, very forgetful, some confusion at times. Significantly [place], hearing aids in.   BEHAVIOR & AGGRESSION TOOL COLOR: Green   CIWA SCORE: NA   ABNL VS/O2: VSS on 6-7 LPM oximizer canula or oxymask when sleeping. Ear probe as circulation in hands are poor.   MOBILITY: Ax1 GB/walker, up to chair today  PAIN MANAGMENT: Denies   DIET: Regular, No Caffeine Diet. Poor appetite  BOWEL/BLADDER: Incontinent.  ABNL LAB/BG: COVID positive, CRP 68.3, WBC 20.8, Ddimer 0.7 ,DRAIN/DEVICES: R PIV SL  TELEMETRY RHYTHM: NA  SKIN: Bruised, small scabs   TESTS/PROCEDURES: None scheduled.   D/C DAY/GOALS/PLACE: unknown- +COVID and wait list at [facility]. Possibly to TCU when medically stable?  Other: Neuros intact. MULLIGAN, frequent productive cough, LS dim/crackles in bases. Continue IV zosyn, Remdesivir and dexamethasone.

## 2020-11-05 NOTE — PROGRESS NOTES
[facility]    Medicine Progress Note - Hospitalist Service       Date of Admission:  10/28/2020  Assessment & Plan   Balta Drake is a 89 year-old male admitted on 10/28/2020.  Past medical history of chronic atrial fibrillation on apixaban, hypertension, dyslipidemia and depression.  He was brought to the ED for evaluation of recent cough with shortness of breath and dyspnea on exertion, with falls at home.       COVID19 infection  Acute hypoxic respiratory failure  Advanced care planning  Presents with cough with mild MULLIGAN/SOB and mild hypoxia.  COVID19 positive.  Unlikely diastolic CHF w/ Pro-BNP wnl and trop x2 negative.  Influenza/RSV PCR negative.  Procal low and CXR negative for infiltrate.  D-dimer minimally elevated at 0.6, but low suspicion for PE as chronically on apixaban. Completed 5-day course of remdesivir 11/3.Family has declined transfer to [facility].   - Significant increase in oxygen needs overnight  - Repeat testing ordered; procalcitonin 0.14-> low suspicion for untreated bacterial infection; NtpBNP -> elevated at 2,297, CXR with hazy interstitial opacity in bilateral bases  - Trial of furosemide 20 mg IV x1 given elevated NtpBNP, though lower suspicion for pulmonary edema  - Discussed goals of care with family including [facility] Cassie. Plan if patient decompensates overnight: Okay to escalate to BiPAP, family (Cassie) should be contacted immediately with plan for him to be comfort cares + BiPAP to temporize him until family can come in to pray/be with him as he passes (reportedly Cassie and her sister have already had COVID).   - Current care plan remains restorative, though family is aware of his guarded prognosis. They may consider discharging home with hospice if he becomes stable enough to facilitate this.   - Continue dexamethasone through 11/8  - Continues piperacillin-tazobactam IV empirically   - Wean oxygen as able   - Judicious use of fluids    Orthostatic  hypotension  Orthostatic vital signs positive 11/3/20, received bolus 500 ml x1  - Hold metoprolol XL while orthostatics remain positive   - Continue to monitor orthostatic vital signs     Thrombocytopenia   Admission plt 112, no history of low platelets.  Note MCV of 102.  B12 and folate wnl. Plt wnl 11/1.  - Platelet count normal 11/5     Abdominal pain, resolved  Suspect constipation.  Lactate, WBC normal.  Previous history of diveriticulitis. No pain complaints following admission.     Hypertension    Chronic atrial fibrillation   Rate controlled.  - Continue PTA amiodarone and apixaban  - Hold parameters on metoprolol XL if remains orthostatic      Weakness with falls at home  No apparent injuries.  May have some degree of deconditioning.  - PT recommends TCU     Decreased PO intake  Ced says he has been dieting and lost about 25# in the past several months.  Current lack of appetite likely due to COVID.   - Nutrition following     Hx of depression  Appears stable and euthymic. Not on scheduled meds for this at the moment.    Diet: Combination Diet Regular Diet Adult; No Caffeine Diet  Snacks/Supplements Adult: Boost Plus; With Meals    DVT Prophylaxis: Apixaban   Meredith Catheter: not present  Code Status: No CPR- Do NOT Intubate      Disposition Plan   Expected discharge: Unclear  Entered: Bruno Sandhu MD 11/05/2020, 5:08 PM       The patient's care was discussed with the patient, patient's nurse and patient's daughters Cassie and Brianne    Bruno Sandhu MD  Hospitalist Service  [facility]    ______________________________________________________________________    Interval History   Overnight had worsening hypoxia despite oximyzer, placed on hi flow oxygen. Symptomatically, patient denies much change compared to yesterday. Up sitting in chair. Pulls at his oxygen and IV at times and so currently has sitter.     Data reviewed today: I reviewed all medications, new labs and imaging  results over the last 24 hours. I personally reviewed no images or EKG's today.    Physical Exam   Vital Signs: Temp: 98.2  F (36.8  C) Temp src: Oral BP: 116/54 Pulse: 69   Resp: 18 SpO2: 91 % O2 Device: High Flow Nasal Cannula (HFNC) Oxygen Delivery: 50 LPM  Weight: 156 lbs 1.37 oz    Constitutional:  NAD  Respiratory: Non-labored breathing at rest, bilateral inspiratory crackles at bases   Cardiovascular: RRR. No peripheral edema.  GI: Soft, non-tender, non-distended.    Skin/Integumen: Warm, dry  Neuro: Alert. Extremely hard of hearing. Able to state name, location, month, year. Not able to state date.     Data   Recent Labs   Lab 11/05/20  1135 11/04/20  1248 11/03/20  1124 11/02/20  1244 11/01/20  0555 10/30/20  0626 10/30/20  0626   WBC 18.3* 17.0* 20.8* 14.6* 6.0   < > 4.0   HGB 13.8 13.9 13.7 14.2 14.6   < > 15.0   MCV 98 100 99 101* 99   < > 101*    196 172 174 160   < > 125*   INR  --   --  2.77* 2.12* 1.57*   < > 1.33*     --  137 133 136   < > 132*   POTASSIUM 3.8  --  4.1 4.6 4.3   < > 3.8   CHLORIDE 102  --  105 101 104   < > 102   CO2 25  --  25 29 27   < > 27   BUN 26  --  27 33* 28   < > 21   CR 0.93  --  0.84 0.92 0.80   < > 0.92   ANIONGAP 7  --  7 3 5   < > 3   MARAH 7.9*  --  8.3* 8.1* 8.4*   < > 8.4*   *  --  109* 186* 106*   < > 108*   ALBUMIN 2.5*  --  2.6* 2.7*  --    < >  --    PROTTOTAL 6.2*  --  6.0* 6.3*  --    < >  --    BILITOTAL 1.3  --  1.2 0.6  --    < >  --    ALKPHOS 110  --  102 97  --    < >  --    ALT 33  --  31 35  --    < >  --    AST 41  --  38 46*  --    < >  --    TROPI  --   --   --   --  <0.015  --  <0.015    < > = values in this interval not displayed.       Recent Results (from the past 24 hour(s))   XR Chest Port 1 View    Narrative    CHEST PORTABLE ONE VIEW November 5, 2020 12:50 PM     HISTORY: Worsening hypoxia, evaluate for interval change.    COMPARISON: 10/28/2020.      Impression    IMPRESSION: Cardiac silhouette is at the upper limits of  normal. Hazy  interstitial opacity is seen in both lung bases which may be due to  atelectasis or a mild interstitial infiltrate. No significant  consolidation, effusion, or pneumothorax. 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 Degenerative changes are noted through the  spine and both shoulders. Old posterior right fourth through sixth rib  fracture deformities are noted.     PURNIMA THRASHER MD     Medications     - MEDICATION INSTRUCTIONS -       - MEDICATION INSTRUCTIONS -         amiodarone  100 mg Oral Daily     apixaban ANTICOAGULANT  5 mg Oral BID     dexamethasone (DECADRON) intermittent infusion  6 mg Intravenous Daily     ezetimibe  10 mg Oral Daily     fluticasone  1 spray Both Nostrils Daily     melatonin  3 mg Oral At Bedtime     metoprolol succinate ER  50 mg Oral Daily     multivitamin w/minerals  1 tablet Oral Daily     piperacillin-tazobactam  4.5 g Intravenous Q6H     polyethylene glycol  17 g Oral Daily     rosuvastatin  20 mg Oral Daily     senna-docusate  1 tablet Oral BID    Or     senna-docusate  2 tablet Oral BID     sodium chloride (PF)  3 mL Intracatheter Q8H     tamsulosin  0.4 mg Oral Daily

## 2020-11-05 NOTE — PROVIDER NOTIFICATION
MD Notification    Notified Person: MD    Notified Person Name:  Elinor     Notification Date/Time: 11/5/2020 0158    Notification Interaction: amcom paging    Purpose of Notification: Can we get orders for high flow NC? Pt on 12L oxymizer and satting 86-89%. Thank you.     Orders Received: Pending    Comments: Writer paged for Primary RN. Pt satting 86-89% on 12L via oxymizer. Requesting orders for high flow nasal cannula. Nursing will continue to monitor.

## 2020-11-05 NOTE — PROGRESS NOTES
SPIRITUAL HEALTH SERVICES Progress Note  [facility]      Received a call was from the person in the [facility] who assists in making anointing arrangements for [facility] patients who are COVID positive and at the end of life.  She had received a call from the family and was reaching out to our department for further information.    In contacting the pt's nurse, chaplains were told that pt is not imminently dying.    Pt's daughter (Araceli) then called the Spiritual Health office, requesting an anointing.  I explained the policy of only anointing COVID positive patients when they are clearly at the end of life. I also told her that we were told that in fact the pt isn't currently imminently dying.  She thought he had taken a turn for the worse this afternoon, so she was surprised to hear that he might not be imminently dying.    I encouraged her to speak with the pt's physician, nurse and other staff to get more information on pt's current state of health.    Pt's family clearly wants pt to be provided the Sacrament of the Sick before he dies, and [facility] staff are aware of that desire as well as [facility] Spiritual Health staff.  I reassured pt's daughter that priests were on-call and available to provide the SOS, should pt suddenly take a turn for the worse.    SH team is available for additional support, per need or request.                                                                                                                                          Madai Puente M.A.  Staff   Pager [phone]  Phone [phone]

## 2020-11-05 NOTE — PLAN OF CARE
DATE & TIME: 11/4/2020 1900-2330  Cognitive Concerns/ Orientation: A&O x3, disoriented to situation. [place], bilateral hearing aides in place.  BEHAVIOR & AGGRESSION TOOL COLOR: Yellow- raises voice at times when frustrated.   ABNL VS/O2: BP soft [see flowsheets]; denies any symptoms. Pt de-satting while at rest, 12-15L via oximyzer; sats anywhere from 89-93%. Weaning as able. Remains afebrile.   MOBILITY: Ax1 GB/walker- in bed all shift d/t respiratory status.   PAIN MANAGMENT: Denies pain.   DIET: Regular, No Caffeine Diet. Good appetite.  BOWEL/BLADDER: Incontinent, no BM this shift  ABNL LAB/BG: COVID positive, WBC 17.0 from 20.8. INR 2.77, CRP inflammation 93.2 [increased].   DRAIN/DEVICES: 2 IV's removed by patient in day shift, New R PIV saline locked; intermittent anbx.   TELEMETRY RHYTHM: NA  SKIN: Bruised, small scabs.   TESTS/PROCEDURES: None scheduled.   D/C DAY/GOALS/PLACE: unknown- +COVID, Possibly to TCU when medically stable. Pt's family refusing transfer to [facility] [per MD note].   OTHER: Connected  with patient. MULLIGAN, frequent productive cough, LS dim/fine crackles in bases. Continue IV zosyn and dexamethasone. Nursing will continue to monitor.

## 2020-11-05 NOTE — PLAN OF CARE
Cognitive Concerns/ Orientation: A&O x3, disoriented to situation. [place], bilateral hearing aides in place. Restless at times.   BEHAVIOR & AGGRESSION TOOL COLOR: Yellow- raises voice at times when frustrated. Green overnight, no aggression issues; restless, pulling at cords/O2.    ABNL VS/O2: Afebrile. O2 sats low 90s on 12-15L oximyzer at start of shift; pt sat up at side of bed to void, sats down to low 70%s, positioned back in bed, O2 only able to get to mid-high 80s, MD and RT notified, RT placed pt on HFNC, now sats mid 90s. RR in 20s. Other VSS.   MOBILITY: Assist of 1, walker, GB   PAIN MANAGMENT: c/o left lower back pain, managed with Tylenol/hot pack   DIET: Regular, No Caffeine Diet.   BOWEL/BLADDER: Incontinent at times, otherwise voiding in urinal. No BM this shift  ABNL LAB/BG: COVID positive, WBC 17.0 from 20.8. INR 2.77, CRP inflammation 93.2.   DRAIN/DEVICES: PIV, saline locked.    TELEMETRY RHYTHM: n/a  SKIN: Bruised, small scabs.   TESTS/PROCEDURES: n/a  D/C DAY/GOALS/PLACE: pending improvement, will likely need TCU. Family declined transfer to [facility].   OTHER: LS diminished, crackles in bilateral bases, labored breathing, appears MULLIGAN, denies SOB (even during period of desatting to 70s.), frequent productive cough. Continues on IV zosyn and dexamethasone.

## 2020-11-05 NOTE — PROGRESS NOTES
NA called writer into room as Sp02 monitoring was continuing to decline and remain at 78-81% via the oximyzer. Writer at bedside. Pt's RR 26-28. Pt appears comfortable other than being tachypneic. No complaints of shortness of breath or discomfort per pt report. Writer titrated oxygen to 15L via oximyzer. Pt now satting 90-92%. Vital signs taken [see flowsheets]. BP soft, pt denies feeling lightheaded or dizzy. Pt remains A&Ox3. Capillary refill remains unchanged. Pt attempting to call Daughter on cellphone. Nursing will continue to monitor.

## 2020-11-05 NOTE — PROGRESS NOTES
Held metoprolol due to orthostatic BP changes. Pt on highflow O2. Up in chair for breakfast, alert/orient X3/forgetul.

## 2020-11-05 NOTE — PROGRESS NOTES
"CLINICAL NUTRITION SERVICES - REASSESSMENT NOTE     Recommendations Ordered by Registered Dietitian (RD):   - Change supplement to 4 oz Boost Plus mixed with 4 oz Skim Milk.   - Continue Thera vit M daily and Regular diet order    Malnutrition: (11/02)   % Weight Loss:  > 10% in 6 months (severe malnutrition)  % Intake:  </= 50% for >/= 5 days (severe malnutrition)  Subcutaneous Fat Loss:  Unable  Muscle Loss:  Unable   Fluid Retention:  None noted     Malnutrition Diagnosis: Severe malnutrition  In Context of:  Acute illness or injury  Chronic illness or disease     EVALUATION OF PROGRESS TOWARD GOALS   Diet: Regular, No caffeine  Boost Plus TID w/ meals     Intake/Tolerance:   - Spoke to patient over the phone briefly this morning. He stated that he was up eating breakfast. [place] that his appetite was \"good, but could always be better\". He knows what Boost is, but could not tell if there was one on his tray (there should be a Boost Plus on each of his meal trays). Stated that he thinks Boost is ok-tasting, but a little thick. Offered to mix with milk, which patient appreciated.   - Receives ~2 adequate meals most days, consumes 25-50% of the meal most often. Per RN documentation yesterday, pt has good appetite.     ASSESSED NUTRITION NEEDS:  Dosing Weight: 70 kg   Estimated Energy Needs: 5930-3172 kcal (25-30 Kcal/Kg)  Justification: maintenance  Estimated Protein Needs:  (1.2-1.5 g pro/Kg)   Justification: maintenance / repletion   Estimated Fluid Needs: 1 mL/kcal  Justification: maintenance    NEW FINDINGS:   - Labs  CRP 93.2 (H) - trending up  Lactate Dehydrogenase 690 (H)  - Meds  Thera vit M dialy   Miralax, Senokot   - Stooling  BM x1 last recorded 11/01  - Weight trending  Date/Time Weight   11/03/20 0616 70.8 kg (156 lb 1.4 oz)   11/01/20 0100 70 kg (154 lb 5.2 oz)   10/31/20 0324 69.8 kg (153 lb 14.1 oz)   10/30/20 1000 81.6 kg (180 lb)       Previous Goals:   Intake of >50% meals at least 2x daily. "   Evaluation: Not met consistently     Previous Nutrition Diagnosis:   Inadequate oral intake related to poor appetite in acute illness as evidenced by intakes of 25-50% at best over 5-day admission  Evaluation: No change    CURRENT NUTRITION DIAGNOSIS  Inadequate oral intake related to reduced appetite in acute illness as evidenced by intakes of 25-75% at best over 8-day admission    INTERVENTIONS  Recommendations / Nutrition Prescription  Regular diet as ordered  Supplement: Boost 4 oz + Skim milk 4 oz TID w/ meals   Thera vit M daily     Implementation  Nutrition Education: spoke to patient over the phone, discussed supplement.     Goals  Intake of >/=50% meals BID.       MONITORING AND EVALUATION:  Progress towards goals will be monitored and evaluated per protocol and Practice Guidelines    Vianca Houser RD, LD  [facility] [place], 66, [facility], MH   Pager: [phone]  Weekend Pager: [phone]

## 2020-11-05 NOTE — PLAN OF CARE
DATE & TIME: 11/4/2020 9271-3013  Cognitive Concerns/ Orientation: A&O x3, disoriented to situation. [place], bilateral hearing aides in place.  BEHAVIOR & AGGRESSION TOOL COLOR: Yellow- raises voice at times when frustrated  ABNL VS/O2: VSS, weaned to 3LPM oximizer canula or oxymask on day shift, up to 10L oxymizer canula/oxymask. Unable to wean back down before end of shift. Use ear probe for oxygenation monitoring.   MOBILITY: Ax1 GB/walker- in bed all shift  PAIN MANAGMENT: Denies pain.   DIET: Regular, No Caffeine Diet. Good appetite.  BOWEL/BLADDER: Incontinent, no BM this shift  ABNL LAB/BG: COVID positive, WBC 17.0 from 20.8. INR 2.77, CRP inflammation 93.2  DRAIN/DEVICES: 2 IV's removed by patient in day shift, New R PIV saline locked.  TELEMETRY RHYTHM: NA  SKIN: Bruised, small scabs   TESTS/PROCEDURES: None scheduled.   D/C DAY/GOALS/PLACE: unknown- +COVID and wait list at [facility]. Possibly to TCU when medically stable?  Other: Connected  with patient. MULLIGAN, frequent productive cough, LS dim/fine crackles in bases. Continue IV zosyn and dexamethasone. Nursing will continue to monitor.

## 2020-11-05 NOTE — PLAN OF CARE
DATE & TIME: 11/5/2020 7-3pm  Cognitive Concerns/ Orientation: A&O x3, disoriented to situation. [place], bilateral hearing aides in place. Restless at times.   BEHAVIOR & AGGRESSION TOOL COLOR: Yellow- raises voice at times when frustrated. Green on day shift  ABNL VS/O2:  RT placed pt on HFNC 71% on nights, now sats mid 90s. RR in 20s. Other VSS.   MOBILITY: Assist of 1, walker, GB   PAIN MANAGMENT: Denies at this time  DIET: Regular, No Caffeine Diet.   BOWEL/BLADDER: Incontinent at times, otherwise voiding in urinal. No BM this shift  ABNL LAB/BG: COVID positive, WBC 18.3 from 20.8. INR 2.77, CRP inflammation 65.6 from 93.2. BNP 2297 lasix given 20mg   DRAIN/DEVICES: PIV, saline locked.    TELEMETRY RHYTHM: n/a  SKIN: Bruised, small scabs.   TESTS/PROCEDURES: Had CXR  D/C DAY/GOALS/PLACE: pending improvement, will likely need TCU. Family declined transfer to [facility].   OTHER: LS diminished, crackles in bilateral bases, labored breathing at times,appears MULLIGAN, denies SOB (even during period of desatting to 70s.), frequent productive cough. Continues on IV zosyn and dexamethasone &IS

## 2020-11-06 NOTE — PLAN OF CARE
DATE & TIME: 11/6/20, 7-3pm       Cognitive Concerns/ Orientation : A&O x 2, disoriented to place and time   BEHAVIOR & AGGRESSION TOOL COLOR: Green  CIWA SCORE: N/a    ABNL VS/O2: VSS on HFNC. O2 saturations fall to 70s when patient dislodged O2 or with any activity but gradually improves once O2 is readjusted. MULLIGAN  MOBILITY: Assist x 1, GB and walker, had syncopal episode this am when transferring from bed to chair.   PAIN MANAGMENT: Denied  DIET: Regular  BOWEL/BLADDER: Voids in urinal with some incontinence  ABNL LAB/BG: COVID positive  DRAIN/DEVICES: PIV SL  TELEMETRY RHYTHM: N/a  SKIN: Bruises and small scabs  TESTS/PROCEDURES: N/a  D/C DAY/GOALS/PLACE: On comfort care now  OTHER IMPORTANT INFO: Family will be coming in to see him & will wean off oxygen once they are here.  Will let nurse know when to start. Ativan given X1 for comfort

## 2020-11-06 NOTE — PLAN OF CARE
Patient Ax4, forgetful, will continuously talk about the high flow O2, or a room that he has reserved for him at a facility.  Anxious at times, but has been redirectable.  Many family members trying to call him on his cell phone and he has [employer] hearing aides, that seemed to make him frustrated and too much clutter on his table.  Incontinent at times, uses BSC or urinal as well.  Lungs diminished on high flow 50 L O2.  O2 sats drop in 80's with activity and pt has some MULLIGAN, recovers and maintains 88-94%.  Plan in place in case patient were to decompensate overnight.  Anointing and last rites sacrament given by carolyn this evening-Cassie updated.  Megan Cox also wants to be updated-apparently there are some family dynamics going on and parts of the family aren't receiving updates.

## 2020-11-06 NOTE — PROGRESS NOTES
[facility]    Medicine Progress Note - Hospitalist Service       Date of Admission:  10/28/2020  Assessment & Plan   Balta Drake is a 89 year-old male admitted on 10/28/2020.  Past medical history of chronic atrial fibrillation on apixaban, hypertension, dyslipidemia and depression.  He was brought to the ED for evaluation of recent cough with shortness of breath and dyspnea on exertion, with falls at home.       COVID19 infection  Acute hypoxic respiratory failure  Advanced care planning  Presents with cough with mild MULLIGAN/SOB and mild hypoxia.  COVID19 positive.  Unlikely diastolic CHF w/ Pro-BNP wnl and trop x2 negative.  Influenza/RSV PCR negative.  Procal low and CXR negative for infiltrate.  D-dimer minimally elevated at 0.6, but low suspicion for PE as chronically on apixaban. Completed 5-day course of remdesivir 11/3. Family has declined transfer to [facility].   *Significant increase in oxygen needs overnight 11/4  *Repeat testing ordered; procalcitonin 0.14-> low suspicion for untreated bacterial infection; NtpBNP -> elevated at 2,297, CXR with hazy interstitial opacity in bilateral bases  - Trial of furosemide 20 mg IV x1 11/5 given elevated NtpBNP, no improvement in oxygenation and worsened orthostatic hypotension as below, low suspicion for fluid   - Continued to have high oxygen needs overnight  - Discussed goals of care again with [facility] Cassie. She has elected to transition to comfort measures only.  - Comfort care orders written, including hydromorphone and lorazepam   - Plan is for family members to coordinate visits with patient per hospital protocol.  Once family members have spent adequate time with him, plan is to premedicate with hydromorphone and begin the process of turning down his high flow oxygen  - Given the significant oxygen needs he has, anticipate that as oxygen is turned down he will pass away here in the hospital within the next 24 hours  - Should he happen  to survive oxygen weaning with adequate symptom control, may attempt to coordinate discharge to home with hospice as patient strongly prefers that he die at home    Orthostatic hypotension  Orthostatic vital signs positive 11/3/20, received bolus 500 ml x1  - Additional episode of orthostatic hypotension with decreased responsiveness when getting up to chair 11/6  - As above, no improvement in respiratory status with furosemide IV, this likely instead exacerbated his orthostatic hypotension.  Will give a 500 ml bolus.    Thrombocytopenia   Admission plt 112, no history of low platelets.  Note MCV of 102.  B12 and folate wnl. Plt wnl 11/1.  - Platelet count normal 11/5     Abdominal pain, resolved  Suspect constipation.  Lactate, WBC normal.  Previous history of diveriticulitis. No pain complaints following admission.     Hypertension    Chronic atrial fibrillation   Rate controlled.  - Discontinue PTA amiodarone and apixaban, metoprolol XL with transition to comfort measures only     Weakness with falls at home  No apparent injuries.  May have some degree of deconditioning.  - No further therapies indicated      Decreased PO intake  Ced says he has been dieting and lost about 25# in the past several months.  Current lack of appetite likely due to COVID.   - Nutrition following     Hx of depression  Appears stable and euthymic. Not on scheduled meds for this at the moment.    Diet: Combination Diet Regular Diet Adult; No Caffeine Diet  Snacks/Supplements Adult: Boost Plus; With Meals    DVT Prophylaxis: Not indicated in comfort based approach  Meredith Catheter: not present  Code Status: No CPR- Do NOT Intubate      Disposition Plan   Expected discharge: Anticipate patient will die in the hospital in the next 24 hours.  Entered: Bruno Sandhu MD 11/06/2020, 5:37 PM       The patient's care was discussed with the patient, patient's nurse, charge nurse and patient's daughter Cassie Sandhu MD  Hospitalist  [facility]    ______________________________________________________________________    Interval History   Continues to require high flow oxygen overnight (~90% oxygen delivery).  Patient had episode when getting to the chair where he had decreased level of responsiveness for a moment, staff assist was called, after a few minutes he became increasingly interactive.  He reports shortness of breath.  He is fixated on having heat delivered to his oxygen tubing, attempted to clarify what he was referring to multiple times without success, discussed that he does have humidity running through his high flow.  Patient denies any other new symptoms.    Data reviewed today: I reviewed all medications, new labs and imaging results over the last 24 hours. I personally reviewed no images or EKG's today.    Physical Exam   Vital Signs: Temp: 97.3  F (36.3  C) Temp src: Axillary BP: 136/68 Pulse: 73   Resp: 18 SpO2: 96 % O2 Device: High Flow Nasal Cannula (HFNC) Oxygen Delivery: 50 LPM  Weight: 156 lbs 1.37 oz    Constitutional:  NAD  Respiratory: Non-labored breathing at rest, bilateral inspiratory crackles at bases   Cardiovascular: RRR. No peripheral edema.  GI: Soft, non-tender, non-distended.    Skin/Integumen: Warm, dry  Neuro: Alert. Extremely hard of hearing. Able to state name, location, reason for hospitalization.  Does not appear to be processing his current situation or illness despite understanding that he has COVID-19, fixated on getting heat in his oxygen tubing, not able to have reliable conversation with him about goals of care.    Data   Recent Labs   Lab 11/05/20  1135 11/04/20  1248 11/03/20  1124 11/02/20  1244 11/01/20  0555   WBC 18.3* 17.0* 20.8* 14.6* 6.0   HGB 13.8 13.9 13.7 14.2 14.6   MCV 98 100 99 101* 99    196 172 174 160   INR  --   --  2.77* 2.12* 1.57*     --  137 133 136   POTASSIUM 3.8  --  4.1 4.6 4.3   CHLORIDE 102  --  105 101 104   CO2 25  --   25 29 27   BUN 26  --  27 33* 28   CR 0.93  --  0.84 0.92 0.80   ANIONGAP 7  --  7 3 5   MARAH 7.9*  --  8.3* 8.1* 8.4*   *  --  109* 186* 106*   ALBUMIN 2.5*  --  2.6* 2.7*  --    PROTTOTAL 6.2*  --  6.0* 6.3*  --    BILITOTAL 1.3  --  1.2 0.6  --    ALKPHOS 110  --  102 97  --    ALT 33  --  31 35  --    AST 41  --  38 46*  --    TROPI  --   --   --   --  <0.015       No results found for this or any previous visit (from the past 24 hour(s)).  Medications     - MEDICATION INSTRUCTIONS -       - MEDICATION INSTRUCTIONS -

## 2020-11-06 NOTE — PROGRESS NOTES
SPIRITUAL HEALTH SERVICES Progress Note  [facility]    Received a call from staff at [facility] about the challenges of Covid patients receiving the sacraments. I shared this information with  supervisors.     I confirmed with the [employer] that this patient did receive Annointing of the Sick last night, from one of the [employer].    SH continues to be available.    Cora Sheridan  Chaplain Resident

## 2020-11-06 NOTE — PLAN OF CARE
DATE & TIME: 11/5/20, 4117 - 8125   Cognitive Concerns/ Orientation : A&O x 2, disoriented to place and time   BEHAVIOR & AGGRESSION TOOL COLOR: Green  CIWA SCORE: N/a   ABNL VS/O2: VSS on HFNC. O2 saturations fall to 70s when patient dislodged O2 but gradually improves once O2 is readjusted. MULLIGAN  MOBILITY: Assist x 1, GB and walker  PAIN MANAGMENT: Denied  DIET: Regular  BOWEL/BLADDER: Voids in urinal with some incontinence  ABNL LAB/BG: COVID positive  DRAIN/DEVICES: PIV SL  TELEMETRY RHYTHM: N/a  SKIN: Bruises and small scabs  TESTS/PROCEDURES: N/a  D/C DAY/GOALS/PLACE: Pending improvement. Will likely need TCU.  OTHER IMPORTANT INFO: Continues on IV Zosyn. Special precautions maintained. Sitter in attendance  MD/RN ROUNDING SIGNED OFF D/E SHIFT: N/a  COMMIT TO SIT DONE AND SIGNED OFF Yes

## 2020-11-07 NOTE — PLAN OF CARE
PT Note 11/7/2020     Per chart review, and discussion with RN, pt/family has decided on comfort cares. Discussed with RN and PT will sign off this date and defer all essential mobility to RN staff.     Physical Therapy Discharge Summary    Reason for therapy discharge:    Change in medical status.    Progress towards therapy goal(s). See goals on Care Plan in [facility] electronic health record for goal details.  Goals partially met.  Barriers to achieving goals:   comfort care initiation. .    Therapy recommendation(s):    No further therapy is recommended.

## 2020-11-07 NOTE — PROGRESS NOTES
[facility]    Medicine Progress Note - Hospitalist Service       Date of Admission:  10/28/2020  Assessment & Plan       Balta Drake is a 89 year old male admitted on 10/28/2020.  Past medical history of chronic atrial fibrillation on apixaban, hypertension, dyslipidemia and depression.  He was brought to the ED for evaluation of recent cough with shortness of breath and dyspnea on exertion, with falls at home.       COVID19 infection  Acute hypoxic respiratory failure  Advanced care planning  Presents with cough with mild MULLIGAN/SOB and mild hypoxia.  COVID19 positive.  Unlikely diastolic CHF w/ Pro-BNP wnl and trop x2 negative.  Influenza/RSV PCR negative.  Procal low and CXR negative for infiltrate.  D-dimer minimally elevated at 0.6, but low suspicion for PE as chronically on apixaban. Completed 5-day course of remdesivir 11/3. Family has declined transfer to [facility].   *Significant increase in oxygen needs overnight 11/4  *Repeat testing ordered; procalcitonin 0.14-> low suspicion for untreated bacterial infection; NtpBNP -> elevated at 2,297, CXR with hazy interstitial opacity in bilateral bases  - transitioned to comfort measures evening of 11/6  - continues with intermittent agitation, discussed possible discharge to daughter's home with hospice tomorrow if continues to appear stable and agitation well controlled     Orthostatic hypotension   Thrombocytopenia    Hypertension    Chronic atrial fibrillation    Physical deconditioning with falls at home   Severe malnutrition  Hx of depression  - all treatment and monitoring of other conditions has been stopped with transition to comfort care         Diet: Combination Diet Regular Diet Adult; No Caffeine Diet  Snacks/Supplements Adult: Boost Plus; With Meals    DVT Prophylaxis: None - comfort care  Meredith Catheter: not present  Code Status: No CPR- Do NOT Intubate           Disposition Plan   Expected discharge: Tomorrow, recommended  to daughter's home with hospice once symptoms adequately controlled, pending death not imminent.  Entered: Jose Antonio Cao MD 11/07/2020, 10:19 AM       The patient's care was discussed with the Bedside Nurse, Patient and Patient's Family.    Jose Antonio Cao MD  Hospitalist Service  [facility]  Contact information available via [facility] Paging/Directory    ______________________________________________________________________    Interval History   He is resting in bed overall appearing comfortable but intermittently restless.  Two daughters are at bedside and report for the most part he has been calm but is intermittently agitated, especially when starts to waken.      Data reviewed today: I reviewed all medications, new labs and imaging results over the last 24 hours. I personally reviewed no images or EKG's today.    Physical Exam   Vital Signs:             SpO2: 96 % O2 Device: High Flow Nasal Cannula (HFNC) Oxygen Delivery: 50 LPM  Weight: 156 lbs 1.37 oz  General Appearance: Elderly male lying in bed in NAD  Respiratory: mild bilateral crackles, no wheezing or tachypnea  Cardiovascular: RRR, normal s1/s2  GI:  Skin: no mottling or cyanosis  Other: Extremities warm and well perfused with good peripheral pulses     Data   Medications     - MEDICATION INSTRUCTIONS -       - MEDICATION INSTRUCTIONS -

## 2020-11-07 NOTE — PROGRESS NOTES
Pt transitioned from HFNC to 5 lpm oxymizer cannula per RN/family request. Pt resting comfortably. Will wean O2 overnight per family.     Will continue to follow.     Talib Musa, RT

## 2020-11-07 NOTE — PLAN OF CARE
DATE & TIME: 11/6/20, 3-11p  Cognitive Concerns/ Orientation : A&O x 1 self, lethargic  BEHAVIOR & AGGRESSION TOOL COLOR: Green, restless at times  CIWA SCORE: N/a    ABNL VS/O2: no VSS, HF change to Oxymask on 5l  MOBILITY:bedrest, total, turn/repo q2h/prn  PAIN MANAGMENT: restless at times-medicated with Dilaudid po and Ativan IV x1  DIET: Regular, no eating  BOWEL/BLADDER: incontinent  ABNL LAB/BG: COVID positive  DRAIN/DEVICES: PIV SL  TELEMETRY RHYTHM: N/a  SKIN: Bruises and small scabs  TESTS/PROCEDURES: N/a  D/C DAY/GOALS/PLACE: On Comfort care now  OTHER IMPORTANT INFO: Daughter here, supportive  MD/RN ROUNDING SIGNED OFF D/E SHIFT: N/a  COMMIT TO SIT DONE AND SIGNED OFF Yes

## 2020-11-07 NOTE — PLAN OF CARE
DATE & TIME: 11/7/20  1900-503  Cognitive Concerns/ Orientation : A/o self only, lethargic   BEHAVIOR & AGGRESSION TOOL COLOR: Green  CIWA SCORE: n/a   ABNL VS/O2: Comfort cares; 5L oximyzer  MOBILITY: Bedrest; T/r q2  PAIN MANAGMENT: Dilaudid PRN, Ativan PRN  DIET: Reg  BOWEL/BLADDER: Incontinent B&B  ABNL LAB/BG: CC  DRAIN/DEVICES:   TELEMETRY RHYTHM:   SKIN: bruising, blanchable redness coccyx  TESTS/PROCEDURES:   D/C DAY/GOALS/PLACE: Comfort cares

## 2020-11-07 NOTE — PROGRESS NOTES
SW:  D:  Responding to MD consult for hospice.   Contacted daughter, Cassie, who is patient's  primary healthcare agent.  Reviewed writer's understanding that Cassie wants to bring patient to her home with hospice if discharge from the hospital is needed.  Cassie explains this is no longer her plan.  May shares there are 7 children and extended family including grandchildren that wanted to be able to see patient.  Their  original thought was that if patient were at home under hospice all family could visit as much as they like. Due to patient having COVID 19 most family are not comfortable visiting patient thus she feels there is no benefit to bringing patient to her home.  Her wish is that patient will be here when he dies.  Writer did explain that if patient becomes stable in the dying process, the physician may request that patient discharge to a hospice setting.   Cassie said several times that she doesn't think patient will last thru the night.  We agreed writer will follow patient's progress in the morning and contact her if discharge planning is needed.

## 2020-11-07 NOTE — PLAN OF CARE
Cognitive Concerns/ Orientation : A&O x 1 self, lethargic  BEHAVIOR & AGGRESSION TOOL COLOR: Green, restless at times  CIWA SCORE: N/a    ABNL VS/O2: no VSS, r/a O2  MOBILITY:bedrest, total, turn/repo q2h/prn  PAIN MANAGMENT: restless at times-medicated with Ativan  DIET: Regular, no eating  BOWEL/BLADDER: incontinent  ABNL LAB/BG: COVID positive  DRAIN/DEVICES: PIV SL  TELEMETRY RHYTHM: N/a  SKIN: Bruises and small scabs  TESTS/PROCEDURES: N/A  D/C DAY/GOALS/PLACE: On Comfort care now. Possible discharge home tomorrow, per MD notes  OTHER IMPORTANT INFO: Daughter here, supportive

## 2020-11-08 ASSESSMENT — ACTIVITIES OF DAILY LIVING (ADL): ADLS_ACUITY_SCORE: 22

## 2020-11-08 NOTE — PROGRESS NOTES
Family took all belongings home with patient. Pt family members forgot laptop  placed in bag at [facility] desk. Family will call later today about picking up.

## 2020-11-08 NOTE — PLAN OF CARE
[id number]:    Resting well. Comfort cares continue. Rectal Tylenol given for elevated temp. PRN Ativan and Dilaudid given when patient showing restlessness. Pt mostly unresponsive. Around 2300 patient's breathing started to slow dramatically. Daughter at bedside.

## 2020-11-08 NOTE — PROGRESS NOTES
House [facility] Death Pronouncement    Called by nursing staff to pronounce Balta Drake dead.    Physical Exam: Unresponsive to noxious stimuli, Spontaneous respirations absent, Breath sounds absent, Carotid pulse absent, Heart sounds absent, Pupillary light reflex absent and Corneal blink reflex absent    Patient was pronounced dead at 11:30 PM, 2020.    No data filed        Active Problems:    Generalized muscle weakness    Clinical diagnosis of COVID-19       Infectious disease present?: YES    Communicable disease present? (examples: HIV, chicken pox, TB, Ebola, CJD) :  YES    Multi-drug resistant organism present? (example: MRSA): NO    Please consider an autopsy if any of the following exist:  NO Unexpected or unexplained death during or following any dental, medical, or surgical diagnostic treatment procedures.   NO Death of mother at or up to seven days after delivery.     NO All  and pediatric deaths.     NO Death where the cause is sufficiently obscure to delay completion of the death certificate.   NO Deaths in which autopsy would confirm a suspected illness/condition that would affect surviving family members or recipients of transplanted organs.     The following deaths must be reported to the 's Office:  NO A death that may be due entirely or in part to any factors other than natural disease (recent surgery, recent trauma, suspected abuse/neglect).   NO A death that may be an accident, suicide, or homicide.     NO Any sudden, unexpected death in which there is no prior history of significant heart disease or any other condition associated with sudden death.   NO A death under suspicious, unusual, or unexpected circumstances.    NO Any death which is apparently due to natural causes but in which the  does not have a personal physician familiar with the patient s medical history, social, or environmental situation or the circumstances of the terminal event.   NO Any death  apparently due to Sudden Infant Death Syndrome.     NO Deaths that occur during, in association with, or as consequences of a diagnostic, therapeutic, or anesthetic procedure.   NO Any death in which a fracture of a major bone has occurred within the past (6) six months.   NO A death of persons note seen by their physician within 120 days of demise.     NO Any death in which the  was an inmate of a public institution or was in the custody of Law Enforcement personnel.   NO  All unexpected deaths of children   NO Solid organ donors   NO Unidentified bodies   NO Deaths of persons whose bodies are to be cremated or otherwise disposed of so that the bodies will later be unavailable for examination;   NO Deaths unattended by a physician outside of a licensed healthcare facility or licensed residential hospice program   NO Deaths occurring within 24 hours of arrival to a health care facility if death is unexpected.    NO Deaths associated with the decedent s employment.   NO Deaths attributed to acts of terrorism.   NO Any death in which there is uncertainty as to whether it is a medical examiner s care should be discussed with the medical investigator.      Death Certificate to be directed to Dr. Jose Antonio Cao, hospitalist    Body disposition: Autopsy was discussed with family member:  Family members in person.  Permission for autopsy was declined.  Body released to the morgue.    FABBY Hung, [facility]  House [facility]

## 2020-11-09 ENCOUNTER — TELEPHONE (OUTPATIENT)
Dept: CARDIOLOGY | Facility: CLINIC | Age: 85
End: 2020-11-09

## 2020-11-09 NOTE — TELEPHONE ENCOUNTER
----- Message from Chelsey Puente sent at 2020 12:57 PM CST -----  Regarding:  pt - all furture order are removed  Device- the pt daughter said she is coming to drop off pt device -I did inform her to recycle it but she still wanted to come.    Thank you,  Jose

## 2020-11-10 NOTE — DISCHARGE SUMMARY
[facility]  Hospitalist Discharge Summary      Date of Admission:  10/28/2020  Date of Discharge:  2020 11:30 PM  Discharging Provider: Jose Antonio Cao MD      Discharge Diagnoses   COVID19 pneumonia  Acute hypoxic respiratory failure  Orthostatic hypotension  Thrombocytopenia  HTN  Chronic A-fib  Physical deconditioning  Severe malnutrition  Depression    Follow-ups Needed After Discharge   None      [place]   [place] [facility] [place]:     Hospital Course         Balta Drake is a 89 year old male admitted on 10/28/2020.  Past medical history of chronic atrial fibrillation on apixaban, hypertension, dyslipidemia and depression.  He was brought to the ED for evaluation of recent cough with shortness of breath and dyspnea on exertion, with falls at home.       COVID19 pneumonia  Acute hypoxic respiratory failure  Presents with cough with mild MULLIGAN/SOB and mild hypoxia.  COVID19 positive.  Unlikely diastolic CHF w/ Pro-BNP wnl and trop x2 negative.  Influenza/RSV PCR negative.  Procal low and CXR negative for infiltrate.  D-dimer minimally elevated at 0.6, but low suspicion for PE as chronically on apixaban. Completed 5-day course of remdesivir 11/3.  Also treated with dexamethasone.  Despite treatment he experienced progressive hypoxia and transitioned to comfort care  following goals of care discussion with family.      Orthostatic hypotension   Thrombocytopenia    Hypertension    Chronic atrial fibrillation    Physical deconditioning with falls at home   Severe malnutrition  Hx of depression        Consultations This Hospital Stay   NUTRITION SERVICES ADULT IP CONSULT  PHYSICAL THERAPY ADULT IP CONSULT  PHYSICAL THERAPY ADULT IP CONSULT  SOCIAL WORK IP CONSULT    Code Status   Prior    Time Spent on this Encounter   I, Jose Antonio Cao MD, personally saw the patient today and spent less than or equal to 30 minutes discharging this patient.        Jose Antonio Cao MD  [place] [address] [facility]  [address] [place] [address]  Phone: [phone]  ______________________________________________________________________    Physical Exam   Vital Signs:                   Weight: 156 lbs 1.37 oz  See death exam by Fede Hope.       Primary Care Physician   Edmundo Roblero    Discharge Orders   No discharge procedures on file.    Significant Results and Procedures   Most Recent 3 CBC's:  Recent Labs   Lab Test 11/05/20  1135 11/04/20  1248 11/03/20  1124   WBC 18.3* 17.0* 20.8*   HGB 13.8 13.9 13.7   MCV 98 100 99    196 172     Most Recent 3 BMP's:  Recent Labs   Lab Test 11/05/20  1135 11/03/20  1124 11/02/20  1244    137 133   POTASSIUM 3.8 4.1 4.6   CHLORIDE 102 105 101   CO2 25 25 29   BUN 26 27 33*   CR 0.93 0.84 0.92   ANIONGAP 7 7 3   MARAH 7.9* 8.3* 8.1*   * 109* 186*     Most Recent 2 LFT's:  Recent Labs   Lab Test 11/05/20  1135 11/03/20  1124   AST 41 38   ALT 33 31   ALKPHOS 110 102   BILITOTAL 1.3 1.2   ,   Results for orders placed or performed during the hospital encounter of 10/28/20   XR Chest Port 1 View    Narrative    CHEST PORTABLE ONE VIEW   10/28/2020 4:18 PM     HISTORY: Cough, fever.    COMPARISON: Chest x-ray on 6/7/2017      Impression    IMPRESSION: Single portable AP view of the chest was obtained. Left  chest wall dual lead AICD leads are in stable position. Stable  cardiomediastinal silhouette. No suspicious focal pulmonary opacities.  No significant pleural effusion or pneumothorax.    RENATO CARY MD   CT Head w/o Contrast    Narrative    CT OF THE HEAD WITHOUT CONTRAST   10/31/2020 8:00 PM     COMPARISON: Head CT 9/13/2009    HISTORY:  Altered level of consciousness. Traumatic head injury.  Status post fall.     TECHNIQUE:  Axial CT images of the head from the skull base to the  vertex were acquired without IV contrast.    FINDINGS:   INTRACRANIAL CONTENTS: No  intracranial hemorrhage, extraaxial  collection, or mass effect.  No CT evidence of acute infarct.   Mild  presumed chronic small vessel ischemic change with moderate  generalized volume loss..    VISUALIZED ORBITS/SINUSES/MASTOIDS: No significant orbital  abnormality.  Persistent opacification of the right maxillary antrum.  The remainder of the paranasal sinuses are clear. Clear mastoids.    OSSEOUS STRUCTURES/SOFT TISSUES: No significant abnormality.      Impression    IMPRESSION:  1.  No acute abnormality.  2.  Mild presumed chronic small vessel ischemic changes with moderate  generalized volume loss.  3.  Persistent opacification of the right maxillary antrum.  4.  No interval change.    Radiation dose for this scan was reduced using automated exposure  control, adjustment of the mA and/or kV according to patient size, or  iterative reconstruction technique    PATRICIA SCHWAB MD   XR Chest Port 1 View    Narrative    CHEST PORTABLE ONE VIEW November 5, 2020 12:50 PM     HISTORY: Worsening hypoxia, evaluate for interval change.    COMPARISON: 10/28/2020.      Impression    IMPRESSION: Cardiac silhouette is at the upper limits of normal. Hazy  interstitial opacity is seen in both lung bases which may be due to  atelectasis or a mild interstitial infiltrate. No significant  consolidation, effusion, or pneumothorax. 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 Degenerative changes are noted through the  spine and both shoulders. Old posterior right fourth through sixth rib  fracture deformities are noted.     PURNIMA THRASHER MD       Discharge Medications   Discharge Medication List as of 11/8/2020  3:02 AM      CONTINUE these medications which have NOT CHANGED    Details   Acetaminophen (TYLENOL) 325 MG CAPS Take 325-650 mg by mouth every 4 hours as needed , Historical      amiodarone (PACERONE) 100 MG TABS tablet Take 1 tablet (100 mg) by mouth daily, Disp-90 tablet, R-0, E-Prescribe      apixaban  ANTICOAGULANT (ELIQUIS) 5 MG tablet Take 1 tablet (5 mg) by mouth 2 times daily, Disp-180 tablet, R-1, E-Prescribe      celecoxib (CELEBREX) 200 MG capsule Take 1 capsule by mouth daily., Disp-90 capsule, R-3, Fax      ezetimibe (ZETIA) 10 MG tablet Take 1 tablet (10 mg) by mouth daily, Disp-90 tablet, R-3, E-Prescribe      fluticasone (FLONASE) 50 MCG/ACT nasal spray Spray 1 spray into both nostrils daily, Historical      metoprolol succinate ER (TOPROL-XL) 50 MG 24 hr tablet Take 1 tablet (50 mg) by mouth daily, Disp-90 tablet, R-1, E-Prescribe      rosuvastatin (CRESTOR) 20 MG tablet Take 1 tablet (20 mg) by mouth daily, Disp-90 tablet, R-3, E-Prescribe      tamsulosin (FLOMAX) 0.4 MG 24 hr capsule Take 0.4 mg by mouth daily, Historical           Allergies   No Known Allergies

## 2024-08-01 NOTE — DISCHARGE INSTRUCTIONS
Heat and/or ice to the sore area on your back, Tylenol 2 tablets 3-4 times a day as needed for pain, tramadol as needed for more severe pain.  Recheck in the clinic this week with your doctor if not resolving for physical therapy.  Use your walker to prevent falls.  
yes...

## (undated) DEVICE — PACK EP SRG PROC LF DISP SAN32EPFSR

## (undated) DEVICE — DEFIB PRO-PADZ LVP LQD GEL ADULT 8900-2105-01

## (undated) DEVICE — CABLE PACING ALLIGATOR CLIP 301-CG

## (undated) RX ORDER — FENTANYL CITRATE 50 UG/ML
INJECTION, SOLUTION INTRAMUSCULAR; INTRAVENOUS
Status: DISPENSED
Start: 2019-08-13

## (undated) RX ORDER — BUPIVACAINE HYDROCHLORIDE 2.5 MG/ML
INJECTION, SOLUTION EPIDURAL; INFILTRATION; INTRACAUDAL
Status: DISPENSED
Start: 2019-08-13

## (undated) RX ORDER — CEFAZOLIN SODIUM 2 G/100ML
INJECTION, SOLUTION INTRAVENOUS
Status: DISPENSED
Start: 2019-08-13

## (undated) RX ORDER — LIDOCAINE HYDROCHLORIDE 10 MG/ML
INJECTION, SOLUTION EPIDURAL; INFILTRATION; INTRACAUDAL; PERINEURAL
Status: DISPENSED
Start: 2019-08-13